# Patient Record
Sex: MALE | Race: WHITE | NOT HISPANIC OR LATINO
[De-identification: names, ages, dates, MRNs, and addresses within clinical notes are randomized per-mention and may not be internally consistent; named-entity substitution may affect disease eponyms.]

---

## 2017-02-01 ENCOUNTER — APPOINTMENT (OUTPATIENT)
Dept: HEART AND VASCULAR | Facility: CLINIC | Age: 82
End: 2017-02-01

## 2017-02-01 VITALS
DIASTOLIC BLOOD PRESSURE: 70 MMHG | HEIGHT: 67 IN | BODY MASS INDEX: 25.11 KG/M2 | SYSTOLIC BLOOD PRESSURE: 122 MMHG | HEART RATE: 71 BPM | WEIGHT: 160 LBS | OXYGEN SATURATION: 97 % | TEMPERATURE: 97.2 F

## 2017-02-01 DIAGNOSIS — M25.552 PAIN IN LEFT HIP: ICD-10-CM

## 2017-08-15 ENCOUNTER — LABORATORY RESULT (OUTPATIENT)
Age: 82
End: 2017-08-15

## 2017-08-16 ENCOUNTER — APPOINTMENT (OUTPATIENT)
Dept: INTERNAL MEDICINE | Facility: CLINIC | Age: 82
End: 2017-08-16
Payer: MEDICARE

## 2017-08-16 VITALS
OXYGEN SATURATION: 97 % | SYSTOLIC BLOOD PRESSURE: 120 MMHG | HEART RATE: 70 BPM | TEMPERATURE: 97.9 F | WEIGHT: 154 LBS | HEIGHT: 67 IN | BODY MASS INDEX: 24.17 KG/M2 | DIASTOLIC BLOOD PRESSURE: 70 MMHG | RESPIRATION RATE: 14 BRPM

## 2017-08-16 PROCEDURE — 93000 ELECTROCARDIOGRAM COMPLETE: CPT

## 2017-08-16 PROCEDURE — 99397 PER PM REEVAL EST PAT 65+ YR: CPT | Mod: 25

## 2017-08-16 PROCEDURE — 36415 COLL VENOUS BLD VENIPUNCTURE: CPT

## 2017-08-17 LAB
ALBUMIN SERPL ELPH-MCNC: 4.6 G/DL
ALP BLD-CCNC: 61 U/L
ALT SERPL-CCNC: 15 U/L
ANION GAP SERPL CALC-SCNC: 16 MMOL/L
APPEARANCE: ABNORMAL
AST SERPL-CCNC: 19 U/L
BASOPHILS # BLD AUTO: 0.03 K/UL
BASOPHILS NFR BLD AUTO: 0.5 %
BILIRUB SERPL-MCNC: 0.6 MG/DL
BILIRUBIN URINE: NEGATIVE
BLOOD URINE: NEGATIVE
BUN SERPL-MCNC: 17 MG/DL
CALCIUM SERPL-MCNC: 9.4 MG/DL
CHLORIDE SERPL-SCNC: 102 MMOL/L
CHOLEST SERPL-MCNC: 249 MG/DL
CHOLEST/HDLC SERPL: 4.5 RATIO
CO2 SERPL-SCNC: 23 MMOL/L
COLOR: ABNORMAL
CREAT SERPL-MCNC: 1.09 MG/DL
CREAT SPEC-SCNC: 206 MG/DL
EOSINOPHIL # BLD AUTO: 0.1 K/UL
EOSINOPHIL NFR BLD AUTO: 1.6 %
GLUCOSE QUALITATIVE U: NORMAL MG/DL
GLUCOSE SERPL-MCNC: 109 MG/DL
HBA1C MFR BLD HPLC: 5.6 %
HCT VFR BLD CALC: 46 %
HDLC SERPL-MCNC: 55 MG/DL
HGB BLD-MCNC: 14.5 G/DL
IMM GRANULOCYTES NFR BLD AUTO: 0.2 %
KETONES URINE: ABNORMAL
LDLC SERPL CALC-MCNC: 178 MG/DL
LEUKOCYTE ESTERASE URINE: NEGATIVE
LYMPHOCYTES # BLD AUTO: 1.54 K/UL
LYMPHOCYTES NFR BLD AUTO: 25.3 %
MAN DIFF?: NORMAL
MCHC RBC-ENTMCNC: 29.2 PG
MCHC RBC-ENTMCNC: 31.5 GM/DL
MCV RBC AUTO: 92.7 FL
MICROALBUMIN 24H UR DL<=1MG/L-MCNC: 1.3 MG/DL
MICROALBUMIN/CREAT 24H UR-RTO: 6 MG/G
MONOCYTES # BLD AUTO: 0.49 K/UL
MONOCYTES NFR BLD AUTO: 8.1 %
NEUTROPHILS # BLD AUTO: 3.91 K/UL
NEUTROPHILS NFR BLD AUTO: 64.3 %
NITRITE URINE: NEGATIVE
PH URINE: 5.5
PLATELET # BLD AUTO: 156 K/UL
POTASSIUM SERPL-SCNC: 4.8 MMOL/L
PROT SERPL-MCNC: 7.5 G/DL
PROTEIN URINE: NEGATIVE MG/DL
RBC # BLD: 4.96 M/UL
RBC # FLD: 14.6 %
SODIUM SERPL-SCNC: 141 MMOL/L
SPECIFIC GRAVITY URINE: 1.03
TRIGL SERPL-MCNC: 81 MG/DL
TSH SERPL-ACNC: 2.61 UIU/ML
UROBILINOGEN URINE: NORMAL MG/DL
WBC # FLD AUTO: 6.08 K/UL

## 2017-10-02 ENCOUNTER — APPOINTMENT (OUTPATIENT)
Dept: UROLOGY | Facility: CLINIC | Age: 82
End: 2017-10-02
Payer: MEDICARE

## 2017-10-02 VITALS — DIASTOLIC BLOOD PRESSURE: 65 MMHG | TEMPERATURE: 97.8 F | SYSTOLIC BLOOD PRESSURE: 123 MMHG | HEART RATE: 74 BPM

## 2017-10-02 DIAGNOSIS — R97.20 ELEVATED PROSTATE, SPECIFIC ANTIGEN [PSA]: ICD-10-CM

## 2017-10-02 DIAGNOSIS — R31.29 OTHER MICROSCOPIC HEMATURIA: ICD-10-CM

## 2017-10-02 PROCEDURE — 99213 OFFICE O/P EST LOW 20 MIN: CPT

## 2017-11-08 ENCOUNTER — APPOINTMENT (OUTPATIENT)
Dept: UROLOGY | Facility: CLINIC | Age: 82
End: 2017-11-08

## 2018-02-09 ENCOUNTER — RX RENEWAL (OUTPATIENT)
Age: 83
End: 2018-02-09

## 2018-04-09 ENCOUNTER — APPOINTMENT (OUTPATIENT)
Dept: HEART AND VASCULAR | Facility: CLINIC | Age: 83
End: 2018-04-09
Payer: MEDICARE

## 2018-04-09 VITALS
WEIGHT: 155 LBS | BODY MASS INDEX: 24.33 KG/M2 | DIASTOLIC BLOOD PRESSURE: 72 MMHG | TEMPERATURE: 97.6 F | HEART RATE: 75 BPM | OXYGEN SATURATION: 97 % | SYSTOLIC BLOOD PRESSURE: 120 MMHG | HEIGHT: 67 IN

## 2018-04-09 DIAGNOSIS — E78.2 MIXED HYPERLIPIDEMIA: ICD-10-CM

## 2018-04-09 DIAGNOSIS — R01.1 CARDIAC MURMUR, UNSPECIFIED: ICD-10-CM

## 2018-04-09 PROCEDURE — 36415 COLL VENOUS BLD VENIPUNCTURE: CPT

## 2018-04-09 PROCEDURE — 93306 TTE W/DOPPLER COMPLETE: CPT | Mod: XE

## 2018-04-09 PROCEDURE — 93000 ELECTROCARDIOGRAM COMPLETE: CPT

## 2018-04-09 PROCEDURE — 99214 OFFICE O/P EST MOD 30 MIN: CPT | Mod: 25

## 2018-04-10 LAB
ALBUMIN SERPL ELPH-MCNC: 4.3 G/DL
ALP BLD-CCNC: 61 U/L
ALT SERPL-CCNC: 12 U/L
ANION GAP SERPL CALC-SCNC: 16 MMOL/L
AST SERPL-CCNC: 17 U/L
BASOPHILS # BLD AUTO: 0.04 K/UL
BASOPHILS NFR BLD AUTO: 0.6 %
BILIRUB SERPL-MCNC: 0.4 MG/DL
BUN SERPL-MCNC: 16 MG/DL
CALCIUM SERPL-MCNC: 9.6 MG/DL
CHLORIDE SERPL-SCNC: 104 MMOL/L
CHOLEST SERPL-MCNC: 261 MG/DL
CHOLEST/HDLC SERPL: 4.5 RATIO
CO2 SERPL-SCNC: 21 MMOL/L
CREAT SERPL-MCNC: 1.13 MG/DL
EOSINOPHIL # BLD AUTO: 0.09 K/UL
EOSINOPHIL NFR BLD AUTO: 1.4 %
GLUCOSE SERPL-MCNC: 95 MG/DL
HBA1C MFR BLD HPLC: 5.5 %
HCT VFR BLD CALC: 43.8 %
HDLC SERPL-MCNC: 58 MG/DL
HGB BLD-MCNC: 14.4 G/DL
IMM GRANULOCYTES NFR BLD AUTO: 0 %
LDLC SERPL CALC-MCNC: 178 MG/DL
LYMPHOCYTES # BLD AUTO: 1.79 K/UL
LYMPHOCYTES NFR BLD AUTO: 28.6 %
MAN DIFF?: NORMAL
MCHC RBC-ENTMCNC: 30.3 PG
MCHC RBC-ENTMCNC: 32.9 GM/DL
MCV RBC AUTO: 92.2 FL
MONOCYTES # BLD AUTO: 0.57 K/UL
MONOCYTES NFR BLD AUTO: 9.1 %
NEUTROPHILS # BLD AUTO: 3.76 K/UL
NEUTROPHILS NFR BLD AUTO: 60.3 %
PLATELET # BLD AUTO: 158 K/UL
POTASSIUM SERPL-SCNC: 4.4 MMOL/L
PROT SERPL-MCNC: 7 G/DL
RBC # BLD: 4.75 M/UL
RBC # FLD: 14.9 %
SODIUM SERPL-SCNC: 141 MMOL/L
TRIGL SERPL-MCNC: 124 MG/DL
WBC # FLD AUTO: 6.25 K/UL

## 2018-07-09 ENCOUNTER — APPOINTMENT (OUTPATIENT)
Dept: HEART AND VASCULAR | Facility: CLINIC | Age: 83
End: 2018-07-09
Payer: MEDICARE

## 2018-07-09 VITALS
HEIGHT: 67 IN | DIASTOLIC BLOOD PRESSURE: 64 MMHG | RESPIRATION RATE: 14 BRPM | HEART RATE: 76 BPM | WEIGHT: 155 LBS | OXYGEN SATURATION: 98 % | TEMPERATURE: 98.2 F | SYSTOLIC BLOOD PRESSURE: 118 MMHG | BODY MASS INDEX: 24.33 KG/M2

## 2018-07-09 DIAGNOSIS — I45.10 UNSPECIFIED RIGHT BUNDLE-BRANCH BLOCK: ICD-10-CM

## 2018-07-09 DIAGNOSIS — Z01.818 ENCOUNTER FOR OTHER PREPROCEDURAL EXAMINATION: ICD-10-CM

## 2018-07-09 DIAGNOSIS — N40.0 BENIGN PROSTATIC HYPERPLASIA WITHOUT LOWER URINARY TRACT SYMPMS: ICD-10-CM

## 2018-07-09 DIAGNOSIS — I44.4 LEFT ANTERIOR FASCICULAR BLOCK: ICD-10-CM

## 2018-07-09 DIAGNOSIS — R94.31 ABNORMAL ELECTROCARDIOGRAM [ECG] [EKG]: ICD-10-CM

## 2018-07-09 DIAGNOSIS — I44.60 UNSPECIFIED FASCICULAR BLOCK: ICD-10-CM

## 2018-07-09 PROCEDURE — 99214 OFFICE O/P EST MOD 30 MIN: CPT | Mod: 25

## 2018-07-09 PROCEDURE — 36415 COLL VENOUS BLD VENIPUNCTURE: CPT

## 2018-07-11 LAB
ALBUMIN SERPL ELPH-MCNC: 4.6 G/DL
ALP BLD-CCNC: 65 U/L
ALT SERPL-CCNC: 22 U/L
ANION GAP SERPL CALC-SCNC: 14 MMOL/L
AST SERPL-CCNC: 30 U/L
BASOPHILS # BLD AUTO: 0.04 K/UL
BASOPHILS NFR BLD AUTO: 0.6 %
BILIRUB SERPL-MCNC: 0.4 MG/DL
BUN SERPL-MCNC: 16 MG/DL
CALCIUM SERPL-MCNC: 9.5 MG/DL
CHLORIDE SERPL-SCNC: 103 MMOL/L
CO2 SERPL-SCNC: 23 MMOL/L
CREAT SERPL-MCNC: 1.09 MG/DL
EOSINOPHIL # BLD AUTO: 0.1 K/UL
EOSINOPHIL NFR BLD AUTO: 1.5 %
GLUCOSE SERPL-MCNC: 142 MG/DL
HCT VFR BLD CALC: 44.4 %
HGB BLD-MCNC: 13.9 G/DL
IMM GRANULOCYTES NFR BLD AUTO: 0.2 %
LYMPHOCYTES # BLD AUTO: 1.82 K/UL
LYMPHOCYTES NFR BLD AUTO: 27.9 %
MAN DIFF?: NORMAL
MCHC RBC-ENTMCNC: 29.1 PG
MCHC RBC-ENTMCNC: 31.3 GM/DL
MCV RBC AUTO: 92.9 FL
MONOCYTES # BLD AUTO: 0.44 K/UL
MONOCYTES NFR BLD AUTO: 6.7 %
NEUTROPHILS # BLD AUTO: 4.11 K/UL
NEUTROPHILS NFR BLD AUTO: 63.1 %
PLATELET # BLD AUTO: 175 K/UL
POTASSIUM SERPL-SCNC: 4.7 MMOL/L
PROT SERPL-MCNC: 7.3 G/DL
RBC # BLD: 4.78 M/UL
RBC # FLD: 14.6 %
SODIUM SERPL-SCNC: 140 MMOL/L
WBC # FLD AUTO: 6.52 K/UL

## 2018-07-12 PROBLEM — N40.0 BENIGN PROSTATIC HYPERTROPHY: Status: ACTIVE | Noted: 2018-07-12

## 2018-07-12 PROBLEM — I44.60 BUNDLE BRANCH BLOCK, LEFT HEMIBLOCK: Status: ACTIVE | Noted: 2018-07-12

## 2018-07-12 PROBLEM — I45.10 RIGHT BUNDLE-BRANCH BLOCK: Status: ACTIVE | Noted: 2018-07-12

## 2018-07-25 PROBLEM — R94.31 ABNORMAL ELECTROCARDIOGRAM: Status: ACTIVE | Noted: 2018-07-12

## 2018-08-15 ENCOUNTER — RX RENEWAL (OUTPATIENT)
Age: 83
End: 2018-08-15

## 2018-08-17 ENCOUNTER — APPOINTMENT (OUTPATIENT)
Dept: HEART AND VASCULAR | Facility: CLINIC | Age: 83
End: 2018-08-17
Payer: MEDICARE

## 2018-08-17 VITALS
DIASTOLIC BLOOD PRESSURE: 80 MMHG | BODY MASS INDEX: 23.86 KG/M2 | RESPIRATION RATE: 14 BRPM | SYSTOLIC BLOOD PRESSURE: 120 MMHG | HEART RATE: 72 BPM | OXYGEN SATURATION: 96 % | HEIGHT: 67 IN | WEIGHT: 152 LBS | TEMPERATURE: 97.7 F

## 2018-08-17 DIAGNOSIS — R26.81 UNSTEADINESS ON FEET: ICD-10-CM

## 2018-08-17 PROCEDURE — 99397 PER PM REEVAL EST PAT 65+ YR: CPT | Mod: 25

## 2018-08-17 PROCEDURE — 36415 COLL VENOUS BLD VENIPUNCTURE: CPT

## 2018-08-20 LAB
ALBUMIN SERPL ELPH-MCNC: 4.9 G/DL
ALP BLD-CCNC: 66 U/L
ALT SERPL-CCNC: 18 U/L
ANION GAP SERPL CALC-SCNC: 14 MMOL/L
AST SERPL-CCNC: 28 U/L
BASOPHILS # BLD AUTO: 0.04 K/UL
BASOPHILS NFR BLD AUTO: 0.6 %
BILIRUB SERPL-MCNC: 0.4 MG/DL
BUN SERPL-MCNC: 17 MG/DL
CALCIUM SERPL-MCNC: 9.8 MG/DL
CHLORIDE SERPL-SCNC: 103 MMOL/L
CHOLEST SERPL-MCNC: 162 MG/DL
CHOLEST/HDLC SERPL: 2.8 RATIO
CO2 SERPL-SCNC: 26 MMOL/L
CREAT SERPL-MCNC: 1 MG/DL
EOSINOPHIL # BLD AUTO: 0.08 K/UL
EOSINOPHIL NFR BLD AUTO: 1.1 %
GLUCOSE SERPL-MCNC: 102 MG/DL
HBA1C MFR BLD HPLC: 5.7 %
HCT VFR BLD CALC: 44.9 %
HDLC SERPL-MCNC: 58 MG/DL
HGB BLD-MCNC: 14.5 G/DL
IMM GRANULOCYTES NFR BLD AUTO: 0.1 %
LDLC SERPL CALC-MCNC: 91 MG/DL
LYMPHOCYTES # BLD AUTO: 1.83 K/UL
LYMPHOCYTES NFR BLD AUTO: 25.6 %
MAN DIFF?: NORMAL
MCHC RBC-ENTMCNC: 29.5 PG
MCHC RBC-ENTMCNC: 32.3 GM/DL
MCV RBC AUTO: 91.4 FL
MONOCYTES # BLD AUTO: 0.6 K/UL
MONOCYTES NFR BLD AUTO: 8.4 %
NEUTROPHILS # BLD AUTO: 4.58 K/UL
NEUTROPHILS NFR BLD AUTO: 64.2 %
PLATELET # BLD AUTO: 183 K/UL
POTASSIUM SERPL-SCNC: 4.8 MMOL/L
PROT SERPL-MCNC: 7.2 G/DL
PSA SERPL-MCNC: 6.6 NG/ML
RBC # BLD: 4.91 M/UL
RBC # FLD: 14.5 %
SODIUM SERPL-SCNC: 142 MMOL/L
TRIGL SERPL-MCNC: 67 MG/DL
TSH SERPL-ACNC: 2.09 UIU/ML
WBC # FLD AUTO: 7.14 K/UL

## 2018-08-21 LAB

## 2018-09-24 ENCOUNTER — RX RENEWAL (OUTPATIENT)
Age: 83
End: 2018-09-24

## 2018-12-27 ENCOUNTER — MEDICATION RENEWAL (OUTPATIENT)
Age: 83
End: 2018-12-27

## 2019-03-19 ENCOUNTER — RX RENEWAL (OUTPATIENT)
Age: 84
End: 2019-03-19

## 2019-09-11 ENCOUNTER — APPOINTMENT (OUTPATIENT)
Dept: HEART AND VASCULAR | Facility: CLINIC | Age: 84
End: 2019-09-11
Payer: MEDICARE

## 2019-09-11 VITALS
TEMPERATURE: 97.8 F | BODY MASS INDEX: 24.33 KG/M2 | HEIGHT: 67 IN | WEIGHT: 155 LBS | DIASTOLIC BLOOD PRESSURE: 68 MMHG | OXYGEN SATURATION: 97 % | RESPIRATION RATE: 14 BRPM | HEART RATE: 70 BPM | SYSTOLIC BLOOD PRESSURE: 120 MMHG

## 2019-09-11 DIAGNOSIS — N40.1 BENIGN PROSTATIC HYPERPLASIA WITH LOWER URINARY TRACT SYMPMS: ICD-10-CM

## 2019-09-11 DIAGNOSIS — R35.1 BENIGN PROSTATIC HYPERPLASIA WITH LOWER URINARY TRACT SYMPMS: ICD-10-CM

## 2019-09-11 PROCEDURE — 36415 COLL VENOUS BLD VENIPUNCTURE: CPT

## 2019-09-11 PROCEDURE — 93000 ELECTROCARDIOGRAM COMPLETE: CPT

## 2019-09-11 PROCEDURE — 99214 OFFICE O/P EST MOD 30 MIN: CPT

## 2019-09-12 ENCOUNTER — TRANSCRIPTION ENCOUNTER (OUTPATIENT)
Age: 84
End: 2019-09-12

## 2019-09-12 LAB
ALBUMIN SERPL ELPH-MCNC: 4.6 G/DL
ALP BLD-CCNC: 60 U/L
ALT SERPL-CCNC: 20 U/L
ANION GAP SERPL CALC-SCNC: 16 MMOL/L
APPEARANCE: CLEAR
AST SERPL-CCNC: 24 U/L
BASOPHILS # BLD AUTO: 0.07 K/UL
BASOPHILS NFR BLD AUTO: 1 %
BILIRUB SERPL-MCNC: 0.5 MG/DL
BILIRUBIN URINE: NEGATIVE
BLOOD URINE: NEGATIVE
BUN SERPL-MCNC: 17 MG/DL
CALCIUM SERPL-MCNC: 9.8 MG/DL
CHLORIDE SERPL-SCNC: 102 MMOL/L
CHOLEST SERPL-MCNC: 176 MG/DL
CHOLEST/HDLC SERPL: 2.9 RATIO
CO2 SERPL-SCNC: 24 MMOL/L
COLOR: YELLOW
CREAT SERPL-MCNC: 1.11 MG/DL
CREAT SPEC-SCNC: 196 MG/DL
EOSINOPHIL # BLD AUTO: 0.1 K/UL
EOSINOPHIL NFR BLD AUTO: 1.4 %
ESTIMATED AVERAGE GLUCOSE: 111 MG/DL
GLUCOSE QUALITATIVE U: NEGATIVE
GLUCOSE SERPL-MCNC: 109 MG/DL
HBA1C MFR BLD HPLC: 5.5 %
HCT VFR BLD CALC: 44.9 %
HDLC SERPL-MCNC: 61 MG/DL
HGB BLD-MCNC: 14.4 G/DL
IMM GRANULOCYTES NFR BLD AUTO: 0.1 %
KETONES URINE: NEGATIVE
LDLC SERPL CALC-MCNC: 104 MG/DL
LEUKOCYTE ESTERASE URINE: NEGATIVE
LYMPHOCYTES # BLD AUTO: 2.09 K/UL
LYMPHOCYTES NFR BLD AUTO: 30.2 %
MAN DIFF?: NORMAL
MCHC RBC-ENTMCNC: 30.3 PG
MCHC RBC-ENTMCNC: 32.1 GM/DL
MCV RBC AUTO: 94.3 FL
MICROALBUMIN 24H UR DL<=1MG/L-MCNC: 3.5 MG/DL
MICROALBUMIN/CREAT 24H UR-RTO: 18 MG/G
MONOCYTES # BLD AUTO: 0.61 K/UL
MONOCYTES NFR BLD AUTO: 8.8 %
NEUTROPHILS # BLD AUTO: 4.04 K/UL
NEUTROPHILS NFR BLD AUTO: 58.5 %
NITRITE URINE: NEGATIVE
PH URINE: 5.5
PLATELET # BLD AUTO: 163 K/UL
POTASSIUM SERPL-SCNC: 5.2 MMOL/L
PROT SERPL-MCNC: 7.1 G/DL
PROTEIN URINE: NORMAL
PSA SERPL-MCNC: 6.65 NG/ML
RBC # BLD: 4.76 M/UL
RBC # FLD: 14.2 %
SODIUM SERPL-SCNC: 142 MMOL/L
SPECIFIC GRAVITY URINE: 1.03
TRIGL SERPL-MCNC: 55 MG/DL
TSH SERPL-ACNC: 3.04 UIU/ML
UROBILINOGEN URINE: NORMAL
WBC # FLD AUTO: 6.92 K/UL

## 2019-09-12 NOTE — DISCUSSION/SUMMARY
[___ Month(s)] : [unfilled] month(s) [With Me] : with me [FreeTextEntry1] : will see second opinion neuro\par rfm discussed

## 2019-09-12 NOTE — PHYSICAL EXAM
[General Appearance - Well Developed] : well developed [Normal Appearance] : normal appearance [Well Groomed] : well groomed [No Deformities] : no deformities [General Appearance - Well Nourished] : well nourished [General Appearance - In No Acute Distress] : no acute distress [Normal Conjunctiva] : the conjunctiva exhibited no abnormalities [Eyelids - No Xanthelasma] : the eyelids demonstrated no xanthelasmas [No Oral Pallor] : no oral pallor [Normal Oral Mucosa] : normal oral mucosa [No Oral Cyanosis] : no oral cyanosis [Normal Jugular Venous A Waves Present] : normal jugular venous A waves present [Normal Jugular Venous V Waves Present] : normal jugular venous V waves present [No Jugular Venous Castillo A Waves] : no jugular venous castillo A waves [Respiration, Rhythm And Depth] : normal respiratory rhythm and effort [Exaggerated Use Of Accessory Muscles For Inspiration] : no accessory muscle use [Auscultation Breath Sounds / Voice Sounds] : lungs were clear to auscultation bilaterally [Heart Rate And Rhythm] : heart rate and rhythm were normal [Heart Sounds] : normal S1 and S2 [Abdomen Soft] : soft [Abdomen Tenderness] : non-tender [Abdomen Mass (___ Cm)] : no abdominal mass palpated [Abnormal Walk] : normal gait [Gait - Sufficient For Exercise Testing] : the gait was sufficient for exercise testing [Nail Clubbing] : no clubbing of the fingernails [Cyanosis, Localized] : no localized cyanosis [Petechial Hemorrhages (___cm)] : no petechial hemorrhages [Skin Color & Pigmentation] : normal skin color and pigmentation [] : no rash [No Venous Stasis] : no venous stasis [Skin Lesions] : no skin lesions [No Skin Ulcers] : no skin ulcer [No Xanthoma] : no  xanthoma was observed [Affect] : the affect was normal [Oriented To Time, Place, And Person] : oriented to person, place, and time [Mood] : the mood was normal [No Anxiety] : not feeling anxious [FreeTextEntry1] : 1/6 kay

## 2019-09-12 NOTE — HISTORY OF PRESENT ILLNESS
[FreeTextEntry1] : for annual\par normal functional capacity\par no cp or dyspnea\par follows w urology and neuro- still having unsteady gait, leg weakness and chr hyperhidrosis\par discussed rfm, cancer screening and vaccines

## 2019-09-13 LAB
B BURGDOR AB SER-IMP: NEGATIVE
B BURGDOR IGM PATRN SER IB-IMP: NEGATIVE
B BURGDOR18KD IGG SER QL IB: NORMAL
B BURGDOR23KD IGG SER QL IB: NORMAL
B BURGDOR23KD IGM SER QL IB: NORMAL
B BURGDOR28KD IGG SER QL IB: NORMAL
B BURGDOR30KD IGG SER QL IB: NORMAL
B BURGDOR31KD IGG SER QL IB: NORMAL
B BURGDOR39KD IGG SER QL IB: NORMAL
B BURGDOR39KD IGM SER QL IB: NORMAL
B BURGDOR41KD IGG SER QL IB: PRESENT
B BURGDOR41KD IGM SER QL IB: PRESENT
B BURGDOR45KD IGG SER QL IB: NORMAL
B BURGDOR58KD IGG SER QL IB: NORMAL
B BURGDOR66KD IGG SER QL IB: NORMAL
B BURGDOR93KD IGG SER QL IB: NORMAL

## 2019-12-17 ENCOUNTER — RX RENEWAL (OUTPATIENT)
Age: 84
End: 2019-12-17

## 2020-03-18 ENCOUNTER — RX RENEWAL (OUTPATIENT)
Age: 85
End: 2020-03-18

## 2020-04-22 ENCOUNTER — RX RENEWAL (OUTPATIENT)
Age: 85
End: 2020-04-22

## 2020-05-29 ENCOUNTER — RX RENEWAL (OUTPATIENT)
Age: 85
End: 2020-05-29

## 2020-06-10 ENCOUNTER — RX RENEWAL (OUTPATIENT)
Age: 85
End: 2020-06-10

## 2020-06-18 ENCOUNTER — RX RENEWAL (OUTPATIENT)
Age: 85
End: 2020-06-18

## 2020-08-14 ENCOUNTER — APPOINTMENT (OUTPATIENT)
Dept: HEART AND VASCULAR | Facility: CLINIC | Age: 85
End: 2020-08-14

## 2020-12-15 ENCOUNTER — RX RENEWAL (OUTPATIENT)
Age: 85
End: 2020-12-15

## 2021-01-21 ENCOUNTER — RX RENEWAL (OUTPATIENT)
Age: 86
End: 2021-01-21

## 2021-03-05 LAB
ALBUMIN SERPL ELPH-MCNC: 4.5 G/DL
ALP BLD-CCNC: 69 U/L
ALT SERPL-CCNC: 20 U/L
ANION GAP SERPL CALC-SCNC: 10 MMOL/L
AST SERPL-CCNC: 30 U/L
BASOPHILS # BLD AUTO: 0.05 K/UL
BASOPHILS NFR BLD AUTO: 0.7 %
BILIRUB SERPL-MCNC: 0.4 MG/DL
BUN SERPL-MCNC: 22 MG/DL
CALCIUM SERPL-MCNC: 9.4 MG/DL
CHLORIDE SERPL-SCNC: 108 MMOL/L
CHOLEST SERPL-MCNC: 155 MG/DL
CO2 SERPL-SCNC: 25 MMOL/L
CREAT SERPL-MCNC: 1.18 MG/DL
EOSINOPHIL # BLD AUTO: 0.1 K/UL
EOSINOPHIL NFR BLD AUTO: 1.5 %
ESTIMATED AVERAGE GLUCOSE: 111 MG/DL
GLUCOSE SERPL-MCNC: 126 MG/DL
HBA1C MFR BLD HPLC: 5.5 %
HCT VFR BLD CALC: 40.9 %
HDLC SERPL-MCNC: 55 MG/DL
HGB BLD-MCNC: 13.6 G/DL
IMM GRANULOCYTES NFR BLD AUTO: 0.3 %
LDLC SERPL CALC-MCNC: 81 MG/DL
LYMPHOCYTES # BLD AUTO: 1.71 K/UL
LYMPHOCYTES NFR BLD AUTO: 25 %
MAN DIFF?: NORMAL
MCHC RBC-ENTMCNC: 30.8 PG
MCHC RBC-ENTMCNC: 33.3 GM/DL
MCV RBC AUTO: 92.5 FL
MONOCYTES # BLD AUTO: 0.51 K/UL
MONOCYTES NFR BLD AUTO: 7.4 %
NEUTROPHILS # BLD AUTO: 4.46 K/UL
NEUTROPHILS NFR BLD AUTO: 65.1 %
NONHDLC SERPL-MCNC: 100 MG/DL
PLATELET # BLD AUTO: 152 K/UL
POTASSIUM SERPL-SCNC: 4.8 MMOL/L
PROT SERPL-MCNC: 6.7 G/DL
RBC # BLD: 4.42 M/UL
RBC # FLD: 13.5 %
SARS-COV-2 IGG SERPL IA-ACNC: 0.07 INDEX
SARS-COV-2 IGG SERPL QL IA: NEGATIVE
SODIUM SERPL-SCNC: 143 MMOL/L
TRIGL SERPL-MCNC: 93 MG/DL
TSH SERPL-ACNC: 2.01 UIU/ML
WBC # FLD AUTO: 6.85 K/UL

## 2021-03-11 ENCOUNTER — APPOINTMENT (OUTPATIENT)
Dept: HEART AND VASCULAR | Facility: CLINIC | Age: 86
End: 2021-03-11
Payer: MEDICARE

## 2021-03-11 ENCOUNTER — APPOINTMENT (OUTPATIENT)
Dept: HEART AND VASCULAR | Facility: CLINIC | Age: 86
End: 2021-03-11

## 2021-03-11 VITALS
DIASTOLIC BLOOD PRESSURE: 70 MMHG | TEMPERATURE: 98.3 F | RESPIRATION RATE: 14 BRPM | HEIGHT: 67 IN | BODY MASS INDEX: 23.54 KG/M2 | WEIGHT: 150 LBS | SYSTOLIC BLOOD PRESSURE: 118 MMHG | HEART RATE: 84 BPM | OXYGEN SATURATION: 98 %

## 2021-03-11 DIAGNOSIS — I34.1 NONRHEUMATIC MITRAL (VALVE) PROLAPSE: ICD-10-CM

## 2021-03-11 PROCEDURE — 99397 PER PM REEVAL EST PAT 65+ YR: CPT

## 2021-03-11 PROCEDURE — 99072 ADDL SUPL MATRL&STAF TM PHE: CPT

## 2021-03-11 PROCEDURE — 93306 TTE W/DOPPLER COMPLETE: CPT

## 2021-03-11 PROCEDURE — 93000 ELECTROCARDIOGRAM COMPLETE: CPT

## 2021-03-15 PROBLEM — I34.1 MVP (MITRAL VALVE PROLAPSE): Status: ACTIVE | Noted: 2018-07-09

## 2021-03-15 NOTE — PHYSICAL EXAM
[General Appearance - Well Developed] : well developed [Normal Appearance] : normal appearance [Well Groomed] : well groomed [General Appearance - Well Nourished] : well nourished [No Deformities] : no deformities [General Appearance - In No Acute Distress] : no acute distress [Normal Conjunctiva] : the conjunctiva exhibited no abnormalities [Eyelids - No Xanthelasma] : the eyelids demonstrated no xanthelasmas [Normal Oral Mucosa] : normal oral mucosa [No Oral Pallor] : no oral pallor [No Oral Cyanosis] : no oral cyanosis [Normal Jugular Venous A Waves Present] : normal jugular venous A waves present [Normal Jugular Venous V Waves Present] : normal jugular venous V waves present [No Jugular Venous Castillo A Waves] : no jugular venous castillo A waves [Respiration, Rhythm And Depth] : normal respiratory rhythm and effort [Exaggerated Use Of Accessory Muscles For Inspiration] : no accessory muscle use [Auscultation Breath Sounds / Voice Sounds] : lungs were clear to auscultation bilaterally [Heart Rate And Rhythm] : heart rate and rhythm were normal [Heart Sounds] : normal S1 and S2 [Abdomen Soft] : soft [Abdomen Tenderness] : non-tender [Abdomen Mass (___ Cm)] : no abdominal mass palpated [Abnormal Walk] : normal gait [Gait - Sufficient For Exercise Testing] : the gait was sufficient for exercise testing [Nail Clubbing] : no clubbing of the fingernails [Cyanosis, Localized] : no localized cyanosis [Petechial Hemorrhages (___cm)] : no petechial hemorrhages [Skin Color & Pigmentation] : normal skin color and pigmentation [] : no rash [No Venous Stasis] : no venous stasis [Skin Lesions] : no skin lesions [No Skin Ulcers] : no skin ulcer [No Xanthoma] : no  xanthoma was observed [Oriented To Time, Place, And Person] : oriented to person, place, and time [Affect] : the affect was normal [Mood] : the mood was normal [No Anxiety] : not feeling anxious [FreeTextEntry1] : 1/6 kay

## 2021-03-15 NOTE — HISTORY OF PRESENT ILLNESS
[FreeTextEntry1] : for annual, f/u AS and MR\par normal functional capacity\par no cp or dyspnea\par follows w urology and neuro- still having unsteady gait, leg weakness and chr hyperhidrosis\par was fitted with leg braces that help considerably\par discussed rfm, cancer screening and vaccines

## 2021-05-18 ENCOUNTER — RX RENEWAL (OUTPATIENT)
Age: 86
End: 2021-05-18

## 2021-06-11 ENCOUNTER — RX RENEWAL (OUTPATIENT)
Age: 86
End: 2021-06-11

## 2021-07-08 ENCOUNTER — NON-APPOINTMENT (OUTPATIENT)
Age: 86
End: 2021-07-08

## 2021-12-14 ENCOUNTER — RX RENEWAL (OUTPATIENT)
Age: 86
End: 2021-12-14

## 2021-12-16 ENCOUNTER — RX RENEWAL (OUTPATIENT)
Age: 86
End: 2021-12-16

## 2022-01-07 ENCOUNTER — RX RENEWAL (OUTPATIENT)
Age: 87
End: 2022-01-07

## 2022-03-09 ENCOUNTER — NON-APPOINTMENT (OUTPATIENT)
Age: 87
End: 2022-03-09

## 2022-03-10 ENCOUNTER — APPOINTMENT (OUTPATIENT)
Dept: HEART AND VASCULAR | Facility: CLINIC | Age: 87
End: 2022-03-10

## 2022-03-14 ENCOUNTER — APPOINTMENT (OUTPATIENT)
Dept: HEART AND VASCULAR | Facility: CLINIC | Age: 87
End: 2022-03-14
Payer: MEDICARE

## 2022-03-14 VITALS
SYSTOLIC BLOOD PRESSURE: 110 MMHG | HEART RATE: 101 BPM | HEIGHT: 67 IN | RESPIRATION RATE: 14 BRPM | DIASTOLIC BLOOD PRESSURE: 70 MMHG | WEIGHT: 145 LBS | OXYGEN SATURATION: 96 % | BODY MASS INDEX: 22.76 KG/M2 | TEMPERATURE: 97.8 F

## 2022-03-14 DIAGNOSIS — E78.5 HYPERLIPIDEMIA, UNSPECIFIED: ICD-10-CM

## 2022-03-14 DIAGNOSIS — Z00.00 ENCOUNTER FOR GENERAL ADULT MEDICAL EXAMINATION W/OUT ABNORMAL FINDINGS: ICD-10-CM

## 2022-03-14 PROCEDURE — 93000 ELECTROCARDIOGRAM COMPLETE: CPT

## 2022-03-14 PROCEDURE — 99397 PER PM REEVAL EST PAT 65+ YR: CPT

## 2022-03-14 PROCEDURE — 36415 COLL VENOUS BLD VENIPUNCTURE: CPT

## 2022-03-14 NOTE — HISTORY OF PRESENT ILLNESS
[FreeTextEntry1] : for annual, \par normal functional capacity\par no cp or dyspnea\par follows w urology and neuro- still having unsteady gait, leg weakness and chr hyperhidrosis\par was fitted with leg braces that help considerably\par discussed rfm, cancer screening and vaccines\par requests endo eval for his thyroid due to heavy sweating that he is concerned is related to a sick euthyroid

## 2022-03-16 LAB
ALBUMIN SERPL ELPH-MCNC: 4.6 G/DL
ALP BLD-CCNC: 67 U/L
ALT SERPL-CCNC: 19 U/L
ANION GAP SERPL CALC-SCNC: 15 MMOL/L
AST SERPL-CCNC: 28 U/L
BASOPHILS # BLD AUTO: 0.06 K/UL
BASOPHILS NFR BLD AUTO: 0.9 %
BILIRUB SERPL-MCNC: 0.4 MG/DL
BUN SERPL-MCNC: 22 MG/DL
CALCIUM SERPL-MCNC: 9.6 MG/DL
CHLORIDE SERPL-SCNC: 105 MMOL/L
CHOLEST SERPL-MCNC: 173 MG/DL
CO2 SERPL-SCNC: 21 MMOL/L
CREAT SERPL-MCNC: 1.18 MG/DL
EGFR: 60 ML/MIN/1.73M2
EOSINOPHIL # BLD AUTO: 0.11 K/UL
EOSINOPHIL NFR BLD AUTO: 1.6 %
ESTIMATED AVERAGE GLUCOSE: 111 MG/DL
GLUCOSE SERPL-MCNC: 100 MG/DL
HBA1C MFR BLD HPLC: 5.5 %
HCT VFR BLD CALC: 44 %
HDLC SERPL-MCNC: 60 MG/DL
HGB BLD-MCNC: 14 G/DL
IMM GRANULOCYTES NFR BLD AUTO: 0.3 %
LDLC SERPL CALC-MCNC: 101 MG/DL
LYMPHOCYTES # BLD AUTO: 1.89 K/UL
LYMPHOCYTES NFR BLD AUTO: 27.1 %
MAN DIFF?: NORMAL
MCHC RBC-ENTMCNC: 29.4 PG
MCHC RBC-ENTMCNC: 31.8 GM/DL
MCV RBC AUTO: 92.4 FL
MONOCYTES # BLD AUTO: 0.53 K/UL
MONOCYTES NFR BLD AUTO: 7.6 %
NEUTROPHILS # BLD AUTO: 4.37 K/UL
NEUTROPHILS NFR BLD AUTO: 62.5 %
NONHDLC SERPL-MCNC: 112 MG/DL
PLATELET # BLD AUTO: 157 K/UL
POTASSIUM SERPL-SCNC: 4.4 MMOL/L
PROT SERPL-MCNC: 6.9 G/DL
RBC # BLD: 4.76 M/UL
RBC # FLD: 14.4 %
SODIUM SERPL-SCNC: 141 MMOL/L
TRIGL SERPL-MCNC: 60 MG/DL
TSH SERPL-ACNC: 4.03 UIU/ML
WBC # FLD AUTO: 6.98 K/UL

## 2022-04-26 RX ORDER — ATORVASTATIN CALCIUM 10 MG/1
10 TABLET, FILM COATED ORAL
Qty: 90 | Refills: 1 | Status: ACTIVE | COMMUNITY
Start: 2018-04-09 | End: 1900-01-01

## 2023-11-27 ENCOUNTER — NON-APPOINTMENT (OUTPATIENT)
Age: 88
End: 2023-11-27

## 2023-11-28 ENCOUNTER — OUTPATIENT (OUTPATIENT)
Dept: OUTPATIENT SERVICES | Facility: HOSPITAL | Age: 88
LOS: 1 days | Discharge: HOME | End: 2023-11-28
Payer: MEDICARE

## 2023-11-28 ENCOUNTER — RESULT REVIEW (OUTPATIENT)
Age: 88
End: 2023-11-28

## 2023-11-28 ENCOUNTER — APPOINTMENT (OUTPATIENT)
Dept: PULMONOLOGY | Facility: HOSPITAL | Age: 88
End: 2023-11-28

## 2023-11-28 LAB
GRAM STN FLD: SIGNIFICANT CHANGE UP
GRAM STN FLD: SIGNIFICANT CHANGE UP
SPECIMEN SOURCE: SIGNIFICANT CHANGE UP
SPECIMEN SOURCE: SIGNIFICANT CHANGE UP

## 2023-11-28 PROCEDURE — 88305 TISSUE EXAM BY PATHOLOGIST: CPT

## 2023-11-28 PROCEDURE — 88173 CYTOPATH EVAL FNA REPORT: CPT | Mod: 26

## 2023-11-28 PROCEDURE — 31628 BRONCHOSCOPY/LUNG BX EACH: CPT | Mod: RT

## 2023-11-28 PROCEDURE — 71045 X-RAY EXAM CHEST 1 VIEW: CPT

## 2023-11-28 PROCEDURE — 87206 SMEAR FLUORESCENT/ACID STAI: CPT

## 2023-11-28 PROCEDURE — 88173 CYTOPATH EVAL FNA REPORT: CPT

## 2023-11-28 PROCEDURE — 87102 FUNGUS ISOLATION CULTURE: CPT

## 2023-11-28 PROCEDURE — 87070 CULTURE OTHR SPECIMN AEROBIC: CPT

## 2023-11-28 PROCEDURE — 88112 CYTOPATH CELL ENHANCE TECH: CPT

## 2023-11-28 PROCEDURE — 87116 MYCOBACTERIA CULTURE: CPT

## 2023-11-28 PROCEDURE — 31653 BRONCH EBUS SAMPLNG 3/> NODE: CPT | Mod: GC

## 2023-11-28 PROCEDURE — 31653 BRONCH EBUS SAMPLNG 3/> NODE: CPT

## 2023-11-28 PROCEDURE — 31624 DX BRONCHOSCOPE/LAVAGE: CPT | Mod: GC

## 2023-11-28 PROCEDURE — 88112 CYTOPATH CELL ENHANCE TECH: CPT | Mod: 26,59

## 2023-11-28 PROCEDURE — 71045 X-RAY EXAM CHEST 1 VIEW: CPT | Mod: 26

## 2023-11-28 PROCEDURE — C9399: CPT

## 2023-11-28 PROCEDURE — S2900: CPT

## 2023-11-28 PROCEDURE — 31624 DX BRONCHOSCOPE/LAVAGE: CPT | Mod: RT

## 2023-11-28 PROCEDURE — 31654 BRONCH EBUS IVNTJ PERPH LES: CPT | Mod: GC

## 2023-11-28 PROCEDURE — 88305 TISSUE EXAM BY PATHOLOGIST: CPT | Mod: 26

## 2023-11-28 PROCEDURE — 31627 NAVIGATIONAL BRONCHOSCOPY: CPT | Mod: GC

## 2023-11-28 PROCEDURE — 31628 BRONCHOSCOPY/LUNG BX EACH: CPT | Mod: GC

## 2023-11-28 PROCEDURE — 76000 FLUOROSCOPY <1 HR PHYS/QHP: CPT

## 2023-11-28 PROCEDURE — 87015 SPECIMEN INFECT AGNT CONCNTJ: CPT

## 2023-11-28 NOTE — PRE-ANESTHESIA EVALUATION ADULT - NSANTHPMHFT_GEN_ALL_CORE
89yo F with PMH s/f   presenting for robotic navigational bronch with biopsy. 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

## 2023-11-28 NOTE — PRE-ANESTHESIA EVALUATION ADULT - NSDENTALSD_ENT_ALL_CORE
Missing multiple teeth - b/l bottom molars, upper right canines + loose posterior bottom molar/loose teeth/missing teeth

## 2023-11-28 NOTE — PRE-ANESTHESIA EVALUATION ADULT - NSANTHPEFT_GEN_ALL_CORE
Appearance is consistent with chronological age  Unlabored breathing  Awake and alert, moves all extremities

## 2023-11-29 LAB
NIGHT BLUE STAIN TISS: SIGNIFICANT CHANGE UP
NIGHT BLUE STAIN TISS: SIGNIFICANT CHANGE UP
NON-GYNECOLOGICAL CYTOLOGY STUDY: SIGNIFICANT CHANGE UP
SPECIMEN SOURCE: SIGNIFICANT CHANGE UP
SPECIMEN SOURCE: SIGNIFICANT CHANGE UP

## 2023-11-30 LAB
CULTURE RESULTS: SIGNIFICANT CHANGE UP
CULTURE RESULTS: SIGNIFICANT CHANGE UP
SPECIMEN SOURCE: SIGNIFICANT CHANGE UP
SPECIMEN SOURCE: SIGNIFICANT CHANGE UP

## 2023-12-01 LAB
SURGICAL PATHOLOGY STUDY: SIGNIFICANT CHANGE UP
SURGICAL PATHOLOGY STUDY: SIGNIFICANT CHANGE UP

## 2023-12-21 ENCOUNTER — APPOINTMENT (OUTPATIENT)
Dept: RADIATION ONCOLOGY | Facility: CLINIC | Age: 88
End: 2023-12-21
Payer: MEDICARE

## 2023-12-21 VITALS
BODY MASS INDEX: 23.02 KG/M2 | TEMPERATURE: 98.7 F | WEIGHT: 147 LBS | HEART RATE: 87 BPM | SYSTOLIC BLOOD PRESSURE: 135 MMHG | OXYGEN SATURATION: 97 % | DIASTOLIC BLOOD PRESSURE: 61 MMHG

## 2023-12-21 DIAGNOSIS — G60.9 HEREDITARY AND IDIOPATHIC NEUROPATHY, UNSPECIFIED: ICD-10-CM

## 2023-12-21 DIAGNOSIS — C34.91 MALIGNANT NEOPLASM OF UNSPECIFIED PART OF RIGHT BRONCHUS OR LUNG: ICD-10-CM

## 2023-12-21 PROCEDURE — 99205 OFFICE O/P NEW HI 60 MIN: CPT

## 2023-12-22 ENCOUNTER — NON-APPOINTMENT (OUTPATIENT)
Age: 88
End: 2023-12-22

## 2023-12-22 PROBLEM — C34.91 SQUAMOUS CELL CARCINOMA OF RIGHT LUNG: Status: ACTIVE | Noted: 2023-12-22

## 2023-12-23 ENCOUNTER — APPOINTMENT (OUTPATIENT)
Dept: MRI IMAGING | Facility: HOSPITAL | Age: 88
End: 2023-12-23

## 2023-12-23 ENCOUNTER — OUTPATIENT (OUTPATIENT)
Dept: OUTPATIENT SERVICES | Facility: HOSPITAL | Age: 88
LOS: 1 days | End: 2023-12-23
Payer: MEDICARE

## 2023-12-23 PROCEDURE — 70553 MRI BRAIN STEM W/O & W/DYE: CPT | Mod: 26

## 2023-12-23 PROCEDURE — 70553 MRI BRAIN STEM W/O & W/DYE: CPT

## 2023-12-23 PROCEDURE — A9585: CPT

## 2023-12-26 NOTE — VITALS
[NoTreatment Scheduled] : no treatment scheduled [Date: ____________] : Patient's last distress assessment performed on [unfilled]. [Referred Patient  to social work for follow-up] : Patient was referred to social work for follow-up [Maximal Pain Intensity: 0/10] : 0/10 [Least Pain Intensity: 0/10] : 0/10 [80: Normal activity with effort; some signs or symptoms of disease.] : 80: Normal activity with effort; some signs or symptoms of disease.  [ECOG Performance Status: 2 - Ambulatory and capable of all self care but unable to carry out any work activities] : Performance Status: 2 - Ambulatory and capable of all self care but unable to carry out any work activities. Up and about more than 50% of waking hours [6 - Distress Level] : Distress Level: 6

## 2023-12-26 NOTE — PHYSICAL EXAM
[Hearing Threshold Finger Rub Not Juab] : hearing was normal [] : no respiratory distress [Respiration, Rhythm And Depth] : normal respiratory rhythm and effort [Exaggerated Use Of Accessory Muscles For Inspiration] : no accessory muscle use [Nondistended] : nondistended [Supraclavicular Lymph Nodes Enlarged Bilaterally] : supraclavicular [Normal] : oriented to person, place and time, the affect was normal, the mood was normal and not anxious

## 2023-12-28 NOTE — REVIEW OF SYSTEMS
[Cough] : cough [Disturbance Of Gait] : gait disturbance [Negative] : Heme/Lymph [Fatigue: Grade 0] : Fatigue: Grade 0 [Cough: Grade 1 - Mild symptoms; nonprescription intervention indicated] : Cough: Grade 1 - Mild symptoms; nonprescription intervention indicated [de-identified] : idiopathic neuropathy

## 2023-12-28 NOTE — HISTORY OF PRESENT ILLNESS
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 Hubbard for discussion of radiation to the Right lung.    11/21/23: Consultation  Patient presents for discussion of radiation to the lung. He denies PMH of radiation or PPM/ICD. He lives alone and has concerns regarding traveling for treatment as he has noticed his balance has been a little more tenuous since diagnosis. 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 He reports he has a sister in [place], but no friends or family in the area. He is amenable to contact by social work to discuss transportation or other support should he choose to proceed with radiation at [facility]. He denies CP/SOB.   History of Present Illness  ___________________________   In mid-2023, patient presented with new symptoms of difficulty breathing and chest pain. He was referred for a CT scan of the chest.  10/9/23 CT chest showed: There is right lower lobe subpleural consolidation measuring 4.0 cm. Differential diagnosis includes malignancy and pneumonia. There are also likely enlarged right hilar lymph nodes. Recommend PET/CT or short-term follow-up imaging in 4-6 weeks after treatment to assess for resolution.    10/27/23 PET 1.  Hypermetabolic partially cavitary right lower lobe pulmonary mass, which is highly suspicious for a primary bronchogenic malignancy. If biopsy is planned, the most intense activity is in the medial aspect of this mass, and this area should be targeted. 2.  Emphysematous changes of the lungs with additional small pulmonary nodules, which are likely below the size resolution of PET imaging. Attention to these nodules on follow-up examinations would be helpful in further evaluation. 3.  Hypermetabolic mediastinal and right hilar lymph nodes, which are highly suspicious for metastatic viv disease. 4.  Otherwise, no abnormal hypermetabolic activity to suggest additional site malignancy. 5.  Cholelithiasis. 6.  Subcentimeter low-attenuation lesions in the liver, which are too small to further characterize. Attention to these findings on follow-up examinations would be helpful in further evaluation. 7.  Markedly enlarged prostate with imaging evidence consistent with bladder outlet obstruction. 8.  Multiple bladder calculi.  11/28/23: EBUS guided FNA  Final Diagnosis Lung, right lower lobe; biopsy: - Squamous cell carcinoma, keratinizing type - See Note. Note: PD-L1 is pending on the concurrent cytology specimen, [id number].  LYMPH NODE, 11RI, EBUS-GUIDED FNA POSITIVE FOR MALIGNANT CELLS. Metastatic squamous cell carcinoma, with keratinizing features  LYMPH NODE,7, EBUS-GUIDED FNA POSITIVE FOR MALIGNANT CELLS. Metastatic squamous cell carcinoma with keratinizing features.  LYMPH NODE,4R, EBUS-GUIDED FNA NEGATIVE FOR MALIGNANT CELLS.   BRONCHOALVEOLAR LAVAGE, RIGHT,LOWER LOBE POSITIVE FOR MALIGNANT CELLS. Keratinizing squamous cell carcinoma.     The patient was evaluated by Dr. Hubbard, who recommended definitive CRT given the patient's N2 disease.

## 2024-01-08 ENCOUNTER — NON-APPOINTMENT (OUTPATIENT)
Age: 89
End: 2024-01-08

## 2024-01-09 ENCOUNTER — TRANSCRIPTION ENCOUNTER (OUTPATIENT)
Age: 89
End: 2024-01-09

## 2024-01-09 ENCOUNTER — APPOINTMENT (OUTPATIENT)
Dept: PULMONOLOGY | Facility: HOSPITAL | Age: 89
End: 2024-01-09

## 2024-01-09 ENCOUNTER — RESULT REVIEW (OUTPATIENT)
Age: 89
End: 2024-01-09

## 2024-01-09 ENCOUNTER — OUTPATIENT (OUTPATIENT)
Dept: OUTPATIENT SERVICES | Facility: HOSPITAL | Age: 89
LOS: 1 days | Discharge: HOME | End: 2024-01-09
Payer: MEDICARE

## 2024-01-09 LAB
ALBUMIN FLD-MCNC: 2.4 G/DL — SIGNIFICANT CHANGE UP
ALBUMIN FLD-MCNC: 2.4 G/DL — SIGNIFICANT CHANGE UP
ANION GAP SERPL CALC-SCNC: 10 MMOL/L — SIGNIFICANT CHANGE UP (ref 5–17)
ANION GAP SERPL CALC-SCNC: 10 MMOL/L — SIGNIFICANT CHANGE UP (ref 5–17)
APTT BLD: 30.3 SEC — SIGNIFICANT CHANGE UP (ref 24.5–35.6)
APTT BLD: 30.3 SEC — SIGNIFICANT CHANGE UP (ref 24.5–35.6)
B PERT IGG+IGM PNL SER: SIGNIFICANT CHANGE UP
B PERT IGG+IGM PNL SER: SIGNIFICANT CHANGE UP
BUN SERPL-MCNC: 19 MG/DL — SIGNIFICANT CHANGE UP (ref 7–23)
BUN SERPL-MCNC: 19 MG/DL — SIGNIFICANT CHANGE UP (ref 7–23)
CALCIUM SERPL-MCNC: 8.9 MG/DL — SIGNIFICANT CHANGE UP (ref 8.4–10.5)
CALCIUM SERPL-MCNC: 8.9 MG/DL — SIGNIFICANT CHANGE UP (ref 8.4–10.5)
CHLORIDE SERPL-SCNC: 101 MMOL/L — SIGNIFICANT CHANGE UP (ref 96–108)
CHLORIDE SERPL-SCNC: 101 MMOL/L — SIGNIFICANT CHANGE UP (ref 96–108)
CO2 SERPL-SCNC: 24 MMOL/L — SIGNIFICANT CHANGE UP (ref 22–31)
CO2 SERPL-SCNC: 24 MMOL/L — SIGNIFICANT CHANGE UP (ref 22–31)
COLOR FLD: SIGNIFICANT CHANGE UP
COLOR FLD: SIGNIFICANT CHANGE UP
CREAT SERPL-MCNC: 1.13 MG/DL — SIGNIFICANT CHANGE UP (ref 0.5–1.3)
CREAT SERPL-MCNC: 1.13 MG/DL — SIGNIFICANT CHANGE UP (ref 0.5–1.3)
EGFR: 63 ML/MIN/1.73M2 — SIGNIFICANT CHANGE UP
EGFR: 63 ML/MIN/1.73M2 — SIGNIFICANT CHANGE UP
EOSINOPHIL # FLD: 7 % — SIGNIFICANT CHANGE UP
EOSINOPHIL # FLD: 7 % — SIGNIFICANT CHANGE UP
FLUID INTAKE SUBSTANCE CLASS: SIGNIFICANT CHANGE UP
FLUID INTAKE SUBSTANCE CLASS: SIGNIFICANT CHANGE UP
GLUCOSE FLD-MCNC: 108 MG/DL — SIGNIFICANT CHANGE UP
GLUCOSE FLD-MCNC: 108 MG/DL — SIGNIFICANT CHANGE UP
GLUCOSE SERPL-MCNC: 132 MG/DL — HIGH (ref 70–99)
GLUCOSE SERPL-MCNC: 132 MG/DL — HIGH (ref 70–99)
GRAM STN FLD: SIGNIFICANT CHANGE UP
GRAM STN FLD: SIGNIFICANT CHANGE UP
HCT VFR BLD CALC: 35.6 % — LOW (ref 39–50)
HCT VFR BLD CALC: 35.6 % — LOW (ref 39–50)
HGB BLD-MCNC: 11.7 G/DL — LOW (ref 13–17)
HGB BLD-MCNC: 11.7 G/DL — LOW (ref 13–17)
INR BLD: 1.12 — SIGNIFICANT CHANGE UP (ref 0.85–1.18)
INR BLD: 1.12 — SIGNIFICANT CHANGE UP (ref 0.85–1.18)
LDH SERPL L TO P-CCNC: 383 U/L — SIGNIFICANT CHANGE UP
LDH SERPL L TO P-CCNC: 383 U/L — SIGNIFICANT CHANGE UP
LDH SERPL L TO P-CCNC: 428 U/L — HIGH (ref 50–242)
LDH SERPL L TO P-CCNC: 428 U/L — HIGH (ref 50–242)
LYMPHOCYTES # FLD: 30 % — SIGNIFICANT CHANGE UP
LYMPHOCYTES # FLD: 30 % — SIGNIFICANT CHANGE UP
MCHC RBC-ENTMCNC: 27 PG — SIGNIFICANT CHANGE UP (ref 27–34)
MCHC RBC-ENTMCNC: 27 PG — SIGNIFICANT CHANGE UP (ref 27–34)
MCHC RBC-ENTMCNC: 32.9 GM/DL — SIGNIFICANT CHANGE UP (ref 32–36)
MCHC RBC-ENTMCNC: 32.9 GM/DL — SIGNIFICANT CHANGE UP (ref 32–36)
MCV RBC AUTO: 82.2 FL — SIGNIFICANT CHANGE UP (ref 80–100)
MCV RBC AUTO: 82.2 FL — SIGNIFICANT CHANGE UP (ref 80–100)
MONOS+MACROS # FLD: 4 % — SIGNIFICANT CHANGE UP
MONOS+MACROS # FLD: 4 % — SIGNIFICANT CHANGE UP
NEUTROPHILS-BODY FLUID: 59 % — SIGNIFICANT CHANGE UP
NEUTROPHILS-BODY FLUID: 59 % — SIGNIFICANT CHANGE UP
NRBC # BLD: 0 /100 WBCS — SIGNIFICANT CHANGE UP (ref 0–0)
NRBC # BLD: 0 /100 WBCS — SIGNIFICANT CHANGE UP (ref 0–0)
PH, PLEURAL FLUID: 7.71 — SIGNIFICANT CHANGE UP
PH, PLEURAL FLUID: 7.71 — SIGNIFICANT CHANGE UP
PLATELET # BLD AUTO: 418 K/UL — HIGH (ref 150–400)
PLATELET # BLD AUTO: 418 K/UL — HIGH (ref 150–400)
POTASSIUM SERPL-MCNC: 4 MMOL/L — SIGNIFICANT CHANGE UP (ref 3.5–5.3)
POTASSIUM SERPL-MCNC: 4 MMOL/L — SIGNIFICANT CHANGE UP (ref 3.5–5.3)
POTASSIUM SERPL-SCNC: 4 MMOL/L — SIGNIFICANT CHANGE UP (ref 3.5–5.3)
POTASSIUM SERPL-SCNC: 4 MMOL/L — SIGNIFICANT CHANGE UP (ref 3.5–5.3)
PROT FLD-MCNC: 3.9 G/DL — SIGNIFICANT CHANGE UP
PROT FLD-MCNC: 3.9 G/DL — SIGNIFICANT CHANGE UP
PROT SERPL-MCNC: 6.2 G/DL — SIGNIFICANT CHANGE UP (ref 6–8.3)
PROT SERPL-MCNC: 6.2 G/DL — SIGNIFICANT CHANGE UP (ref 6–8.3)
PROTHROM AB SERPL-ACNC: 12.7 SEC — SIGNIFICANT CHANGE UP (ref 9.5–13)
PROTHROM AB SERPL-ACNC: 12.7 SEC — SIGNIFICANT CHANGE UP (ref 9.5–13)
RBC # BLD: 4.33 M/UL — SIGNIFICANT CHANGE UP (ref 4.2–5.8)
RBC # BLD: 4.33 M/UL — SIGNIFICANT CHANGE UP (ref 4.2–5.8)
RBC # FLD: 14 % — SIGNIFICANT CHANGE UP (ref 10.3–14.5)
RBC # FLD: 14 % — SIGNIFICANT CHANGE UP (ref 10.3–14.5)
RCV VOL RI: HIGH /UL (ref 0–0)
RCV VOL RI: HIGH /UL (ref 0–0)
SODIUM SERPL-SCNC: 135 MMOL/L — SIGNIFICANT CHANGE UP (ref 135–145)
SODIUM SERPL-SCNC: 135 MMOL/L — SIGNIFICANT CHANGE UP (ref 135–145)
SPECIMEN SOURCE FLD: SIGNIFICANT CHANGE UP
SPECIMEN SOURCE: SIGNIFICANT CHANGE UP
SPECIMEN SOURCE: SIGNIFICANT CHANGE UP
TOTAL NUCLEATED CELL COUNT, BODY FLUID: 1081 /UL — SIGNIFICANT CHANGE UP
TOTAL NUCLEATED CELL COUNT, BODY FLUID: 1081 /UL — SIGNIFICANT CHANGE UP
TUBE TYPE: SIGNIFICANT CHANGE UP
TUBE TYPE: SIGNIFICANT CHANGE UP
WBC # BLD: 15.8 K/UL — HIGH (ref 3.8–10.5)
WBC # BLD: 15.8 K/UL — HIGH (ref 3.8–10.5)
WBC # FLD AUTO: 15.8 K/UL — HIGH (ref 3.8–10.5)
WBC # FLD AUTO: 15.8 K/UL — HIGH (ref 3.8–10.5)

## 2024-01-09 PROCEDURE — 88112 CYTOPATH CELL ENHANCE TECH: CPT

## 2024-01-09 PROCEDURE — 85610 PROTHROMBIN TIME: CPT

## 2024-01-09 PROCEDURE — 85027 COMPLETE CBC AUTOMATED: CPT

## 2024-01-09 PROCEDURE — 32555 ASPIRATE PLEURA W/ IMAGING: CPT | Mod: GC

## 2024-01-09 PROCEDURE — 82945 GLUCOSE OTHER FLUID: CPT

## 2024-01-09 PROCEDURE — 84311 SPECTROPHOTOMETRY: CPT

## 2024-01-09 PROCEDURE — 84157 ASSAY OF PROTEIN OTHER: CPT

## 2024-01-09 PROCEDURE — 85730 THROMBOPLASTIN TIME PARTIAL: CPT

## 2024-01-09 PROCEDURE — 83986 ASSAY PH BODY FLUID NOS: CPT

## 2024-01-09 PROCEDURE — 87070 CULTURE OTHR SPECIMN AEROBIC: CPT

## 2024-01-09 PROCEDURE — 87205 SMEAR GRAM STAIN: CPT

## 2024-01-09 PROCEDURE — 76604 US EXAM CHEST: CPT | Mod: 26,GC

## 2024-01-09 PROCEDURE — 82042 OTHER SOURCE ALBUMIN QUAN EA: CPT

## 2024-01-09 PROCEDURE — 32555 ASPIRATE PLEURA W/ IMAGING: CPT | Mod: RT

## 2024-01-09 PROCEDURE — 36415 COLL VENOUS BLD VENIPUNCTURE: CPT

## 2024-01-09 PROCEDURE — 89051 BODY FLUID CELL COUNT: CPT

## 2024-01-09 PROCEDURE — 87075 CULTR BACTERIA EXCEPT BLOOD: CPT

## 2024-01-09 PROCEDURE — 84155 ASSAY OF PROTEIN SERUM: CPT

## 2024-01-09 PROCEDURE — 80048 BASIC METABOLIC PNL TOTAL CA: CPT

## 2024-01-09 PROCEDURE — 88305 TISSUE EXAM BY PATHOLOGIST: CPT

## 2024-01-09 PROCEDURE — 83615 LACTATE (LD) (LDH) ENZYME: CPT

## 2024-01-09 PROCEDURE — 88112 CYTOPATH CELL ENHANCE TECH: CPT | Mod: 26

## 2024-01-09 PROCEDURE — C1729: CPT

## 2024-01-09 PROCEDURE — 88305 TISSUE EXAM BY PATHOLOGIST: CPT | Mod: 26

## 2024-01-09 DEVICE — TRAY THORAC PARACENT W CATH AND DRAIN SET: Type: IMPLANTABLE DEVICE | Status: FUNCTIONAL

## 2024-01-09 NOTE — PROCEDURE NOTE - NSUSCPTCODES_ED_ALL
69373 US Chest (PTX, Pleural Effussion/CHF vs COPD) 28545 US Chest (PTX, Pleural Effussion/CHF vs COPD)

## 2024-01-09 NOTE — PROCEDURE NOTE - NSUSDIAGNOSIS_ED_ALL
large pleural effusion with septations on Right sided with anterior component. no effusion on left/Pleural Effusion Right

## 2024-01-10 LAB
CHOLEST FLD-MCNC: 74 MG/DL — SIGNIFICANT CHANGE UP
CHOLEST FLD-MCNC: 74 MG/DL — SIGNIFICANT CHANGE UP

## 2024-01-11 LAB
NON-GYNECOLOGICAL CYTOLOGY STUDY: SIGNIFICANT CHANGE UP
NON-GYNECOLOGICAL CYTOLOGY STUDY: SIGNIFICANT CHANGE UP

## 2024-01-14 LAB
CULTURE RESULTS: NO GROWTH — SIGNIFICANT CHANGE UP
CULTURE RESULTS: NO GROWTH — SIGNIFICANT CHANGE UP
SPECIMEN SOURCE: SIGNIFICANT CHANGE UP
SPECIMEN SOURCE: SIGNIFICANT CHANGE UP

## 2024-01-16 ENCOUNTER — APPOINTMENT (OUTPATIENT)
Dept: PULMONOLOGY | Facility: CLINIC | Age: 89
End: 2024-01-16
Payer: MEDICARE

## 2024-01-16 VITALS
HEART RATE: 122 BPM | DIASTOLIC BLOOD PRESSURE: 73 MMHG | HEIGHT: 67 IN | OXYGEN SATURATION: 96 % | RESPIRATION RATE: 12 BRPM | WEIGHT: 138 LBS | BODY MASS INDEX: 21.66 KG/M2 | TEMPERATURE: 98.4 F | SYSTOLIC BLOOD PRESSURE: 129 MMHG

## 2024-01-16 PROCEDURE — 76604 US EXAM CHEST: CPT

## 2024-01-16 PROCEDURE — 99213 OFFICE O/P EST LOW 20 MIN: CPT | Mod: 25

## 2024-01-17 ENCOUNTER — APPOINTMENT (OUTPATIENT)
Dept: PULMONOLOGY | Facility: HOSPITAL | Age: 89
End: 2024-01-17

## 2024-01-17 ENCOUNTER — RESULT REVIEW (OUTPATIENT)
Age: 89
End: 2024-01-17

## 2024-01-17 ENCOUNTER — OUTPATIENT (OUTPATIENT)
Dept: OUTPATIENT SERVICES | Facility: HOSPITAL | Age: 89
LOS: 1 days | Discharge: HOME | End: 2024-01-17
Payer: MEDICARE

## 2024-01-17 PROCEDURE — 71045 X-RAY EXAM CHEST 1 VIEW: CPT

## 2024-01-17 PROCEDURE — 88112 CYTOPATH CELL ENHANCE TECH: CPT | Mod: 26

## 2024-01-17 PROCEDURE — 88112 CYTOPATH CELL ENHANCE TECH: CPT

## 2024-01-17 PROCEDURE — 32550 INSERT PLEURAL CATH: CPT | Mod: GC

## 2024-01-17 PROCEDURE — 88305 TISSUE EXAM BY PATHOLOGIST: CPT | Mod: 26

## 2024-01-17 PROCEDURE — C1729: CPT

## 2024-01-17 PROCEDURE — 88305 TISSUE EXAM BY PATHOLOGIST: CPT

## 2024-01-17 PROCEDURE — 76604 US EXAM CHEST: CPT | Mod: 26,GC

## 2024-01-17 PROCEDURE — 71045 X-RAY EXAM CHEST 1 VIEW: CPT | Mod: 26

## 2024-01-17 PROCEDURE — 31643 DIAG BRONCHOSCOPE/CATHETER: CPT

## 2024-01-17 DEVICE — IMPLANTABLE DEVICE: Type: IMPLANTABLE DEVICE | Status: FUNCTIONAL

## 2024-01-17 DEVICE — PLEURX CATHETER KIT: Type: IMPLANTABLE DEVICE | Status: FUNCTIONAL

## 2024-01-17 RX ORDER — OXYCODONE AND ACETAMINOPHEN 5; 325 MG/1; MG/1
5-325 TABLET ORAL
Qty: 15 | Refills: 0 | Status: ACTIVE | COMMUNITY
Start: 2024-01-17 | End: 1900-01-01

## 2024-01-17 NOTE — PROCEDURE NOTE - NSUS ED ADDITIONAL DETAIL1 FT
Right sided pleural effusion, complicated and loculated in nature, moderate in size tracking along posterior and anterior wall. US used during procedure confirming guidewire placement.  Guidewire noted in pleural space.

## 2024-01-17 NOTE — HISTORY OF PRESENT ILLNESS
[TextBox_4] : 87 yo M with Right Lung SCCa RLL T2bN2 Stage IIIA  who was undergoing CT stim test when he was found to have a large right pleural effusion.   S/p  R thoracentesis on 1/9 with 2L removed (cytology negative, neutrophils 59%, lymphocytes 30%, exudative by criteria).   He presents today for follow up post-procedure. He reports that 3 days after his procedure, he developed SOB symptoms again, worse with laying down. He also reports progressive fatigue, loss of appetite.   US performed today, reaccumulation of fluid on R seen.

## 2024-01-17 NOTE — PROCEDURE
[Thoracic Ultrasound] : Thoracic Ultrasound [Complex] : complex [A line] : A line: Yes [B line] : B line: Yes [Lung sliding] : Lung sliding: Yes [Pleural Effusion] : Pleural Effusion: No [Consolidation] : Consolidation: No [de-identified] : pleural effusion [FreeTextEntry2] : tumor within RLL seen on US exam, effusion complex and loculated tracing up and anteriorly

## 2024-01-17 NOTE — ASSESSMENT
[FreeTextEntry1] : 87 yo M with Right Lung SCC RLL T2bN2 presumed IIIA  who was undergoing CT stim test when he was found to have a large right pleural effusion. He underwent R thoracentesis 1/9 with 2L removed (cytology negative, neutrophils 59%, lymphocytes 30%, exudative by criteria).   Fluid reaccumulate and he is symptomatic. he will need longer lasting intervention to manage fluid. IPC was recommended. Patient agreed. Procedure, risk benefit and alternative were discussed with patient and his sister. All questions were answered. Patient agrees to proceed with placement of IPC on 1/17/24. We'll resend fluid for cytology. Further management based on fluid analysis.

## 2024-01-17 NOTE — PHYSICAL EXAM
[No Acute Distress] : no acute distress [Normal Oropharynx] : normal oropharynx [Normal Appearance] : normal appearance [No Neck Mass] : no neck mass [Normal Rate/Rhythm] : normal rate/rhythm [Normal S1, S2] : normal s1, s2 [No Murmurs] : no murmurs [No Resp Distress] : no resp distress [No Abnormalities] : no abnormalities [Benign] : benign [Normal Gait] : normal gait [No Clubbing] : no clubbing [No Edema] : no edema [Normal Color/ Pigmentation] : normal color/ pigmentation [No Focal Deficits] : no focal deficits [Oriented x3] : oriented x3 [Normal Affect] : normal affect [Well Groomed] : well groomed [Supple] : supple [No Cyanosis] : no cyanosis [TextBox_68] : dec breath sounds on R

## 2024-01-17 NOTE — REASON FOR VISIT
[Follow-Up] : a follow-up visit [Abnormal CXR/ Chest CT] : an abnormal CXR/ chest CT [Lung Cancer] : lung cancer [Shortness of Breath] : shortness of breath [Other: _____] : [unfilled] [TextBox_44] : R pleural effusion

## 2024-01-17 NOTE — REVIEW OF SYSTEMS
[Fatigue] : fatigue [Poor Appetite] : poor appetite [Negative] : Neurologic [Chest Tightness] : no chest tightness [Dyspnea] : dyspnea [Pleuritic Pain] : no pleuritic pain [SOB on Exertion] : sob on exertion [Immunocompromised] : immunocompromised

## 2024-01-21 LAB — NON-GYNECOLOGICAL CYTOLOGY STUDY: SIGNIFICANT CHANGE UP

## 2024-01-25 ENCOUNTER — NON-APPOINTMENT (OUTPATIENT)
Age: 89
End: 2024-01-25

## 2024-02-20 ENCOUNTER — INPATIENT (INPATIENT)
Facility: HOSPITAL | Age: 89
LOS: 8 days | Discharge: NURSING, REHAB OR LONG-TERM CARE | DRG: 180 | End: 2024-02-29
Attending: STUDENT IN AN ORGANIZED HEALTH CARE EDUCATION/TRAINING PROGRAM | Admitting: INTERNAL MEDICINE
Payer: MEDICARE

## 2024-02-20 ENCOUNTER — NON-APPOINTMENT (OUTPATIENT)
Age: 89
End: 2024-02-20

## 2024-02-20 VITALS
OXYGEN SATURATION: 97 % | HEART RATE: 100 BPM | SYSTOLIC BLOOD PRESSURE: 90 MMHG | HEIGHT: 67 IN | TEMPERATURE: 97 F | DIASTOLIC BLOOD PRESSURE: 57 MMHG | WEIGHT: 115.08 LBS | RESPIRATION RATE: 18 BRPM

## 2024-02-20 DIAGNOSIS — Z98.890 OTHER SPECIFIED POSTPROCEDURAL STATES: Chronic | ICD-10-CM

## 2024-02-20 DIAGNOSIS — R62.7 ADULT FAILURE TO THRIVE: ICD-10-CM

## 2024-02-20 DIAGNOSIS — E46 UNSPECIFIED PROTEIN-CALORIE MALNUTRITION: ICD-10-CM

## 2024-02-20 DIAGNOSIS — C34.90 MALIGNANT NEOPLASM OF UNSPECIFIED PART OF UNSPECIFIED BRONCHUS OR LUNG: ICD-10-CM

## 2024-02-20 DIAGNOSIS — J90 PLEURAL EFFUSION, NOT ELSEWHERE CLASSIFIED: ICD-10-CM

## 2024-02-20 DIAGNOSIS — D72.829 ELEVATED WHITE BLOOD CELL COUNT, UNSPECIFIED: ICD-10-CM

## 2024-02-20 DIAGNOSIS — Z29.9 ENCOUNTER FOR PROPHYLACTIC MEASURES, UNSPECIFIED: ICD-10-CM

## 2024-02-20 DIAGNOSIS — D64.9 ANEMIA, UNSPECIFIED: ICD-10-CM

## 2024-02-20 DIAGNOSIS — Z87.438 PERSONAL HISTORY OF OTHER DISEASES OF MALE GENITAL ORGANS: ICD-10-CM

## 2024-02-20 LAB
ADD ON TEST-SPECIMEN IN LAB: SIGNIFICANT CHANGE UP
ADD ON TEST-SPECIMEN IN LAB: SIGNIFICANT CHANGE UP
ANION GAP SERPL CALC-SCNC: 9 MMOL/L — SIGNIFICANT CHANGE UP (ref 5–17)
ANISOCYTOSIS BLD QL: SLIGHT — SIGNIFICANT CHANGE UP
APPEARANCE UR: CLEAR — SIGNIFICANT CHANGE UP
BACTERIA # UR AUTO: NEGATIVE /HPF — SIGNIFICANT CHANGE UP
BASOPHILS # BLD AUTO: 0 K/UL — SIGNIFICANT CHANGE UP (ref 0–0.2)
BASOPHILS NFR BLD AUTO: 0 % — SIGNIFICANT CHANGE UP (ref 0–2)
BILIRUB UR-MCNC: NEGATIVE — SIGNIFICANT CHANGE UP
BLD GP AB SCN SERPL QL: NEGATIVE — SIGNIFICANT CHANGE UP
BLD GP AB SCN SERPL QL: NEGATIVE — SIGNIFICANT CHANGE UP
BUN SERPL-MCNC: 25 MG/DL — HIGH (ref 7–23)
CALCIUM SERPL-MCNC: 9.2 MG/DL — SIGNIFICANT CHANGE UP (ref 8.4–10.5)
CAST: 2 /LPF — SIGNIFICANT CHANGE UP (ref 0–4)
CHLORIDE SERPL-SCNC: 96 MMOL/L — SIGNIFICANT CHANGE UP (ref 96–108)
CO2 SERPL-SCNC: 28 MMOL/L — SIGNIFICANT CHANGE UP (ref 22–31)
COLOR SPEC: YELLOW — SIGNIFICANT CHANGE UP
CREAT SERPL-MCNC: 0.96 MG/DL — SIGNIFICANT CHANGE UP (ref 0.5–1.3)
CRP SERPL-MCNC: 141.2 MG/L — HIGH (ref 0–4)
DACRYOCYTES BLD QL SMEAR: SLIGHT — SIGNIFICANT CHANGE UP
DIFF PNL FLD: ABNORMAL
EGFR: 76 ML/MIN/1.73M2 — SIGNIFICANT CHANGE UP
EOSINOPHIL # BLD AUTO: 0 K/UL — SIGNIFICANT CHANGE UP (ref 0–0.5)
EOSINOPHIL NFR BLD AUTO: 0 % — SIGNIFICANT CHANGE UP (ref 0–6)
ERYTHROCYTE [SEDIMENTATION RATE] IN BLOOD: 42 MM/HR — HIGH
GLUCOSE SERPL-MCNC: 122 MG/DL — HIGH (ref 70–99)
GLUCOSE UR QL: NEGATIVE MG/DL — SIGNIFICANT CHANGE UP
HCT VFR BLD CALC: 31.4 % — LOW (ref 39–50)
HGB BLD-MCNC: 9.8 G/DL — LOW (ref 13–17)
HYPOCHROMIA BLD QL: SLIGHT — SIGNIFICANT CHANGE UP
KETONES UR-MCNC: NEGATIVE MG/DL — SIGNIFICANT CHANGE UP
LACTATE SERPL-SCNC: 1.2 MMOL/L — SIGNIFICANT CHANGE UP (ref 0.5–2)
LEUKOCYTE ESTERASE UR-ACNC: ABNORMAL
LYMPHOCYTES # BLD AUTO: 0.64 K/UL — LOW (ref 1–3.3)
LYMPHOCYTES # BLD AUTO: 2.6 % — LOW (ref 13–44)
MANUAL SMEAR VERIFICATION: SIGNIFICANT CHANGE UP
MCHC RBC-ENTMCNC: 25.6 PG — LOW (ref 27–34)
MCHC RBC-ENTMCNC: 31.2 GM/DL — LOW (ref 32–36)
MCV RBC AUTO: 82 FL — SIGNIFICANT CHANGE UP (ref 80–100)
MICROCYTES BLD QL: SLIGHT — SIGNIFICANT CHANGE UP
MONOCYTES # BLD AUTO: 0.86 K/UL — SIGNIFICANT CHANGE UP (ref 0–0.9)
MONOCYTES NFR BLD AUTO: 3.5 % — SIGNIFICANT CHANGE UP (ref 2–14)
NEUTROPHILS # BLD AUTO: 23.01 K/UL — HIGH (ref 1.8–7.4)
NEUTROPHILS NFR BLD AUTO: 93.9 % — HIGH (ref 43–77)
NITRITE UR-MCNC: NEGATIVE — SIGNIFICANT CHANGE UP
OVALOCYTES BLD QL SMEAR: SLIGHT — SIGNIFICANT CHANGE UP
PH UR: 6.5 — SIGNIFICANT CHANGE UP (ref 5–8)
PLAT MORPH BLD: NORMAL — SIGNIFICANT CHANGE UP
PLATELET # BLD AUTO: 441 K/UL — HIGH (ref 150–400)
POIKILOCYTOSIS BLD QL AUTO: SLIGHT — SIGNIFICANT CHANGE UP
POLYCHROMASIA BLD QL SMEAR: SLIGHT — SIGNIFICANT CHANGE UP
POTASSIUM SERPL-MCNC: 3.7 MMOL/L — SIGNIFICANT CHANGE UP (ref 3.5–5.3)
POTASSIUM SERPL-SCNC: 3.7 MMOL/L — SIGNIFICANT CHANGE UP (ref 3.5–5.3)
PROCALCITONIN SERPL-MCNC: 0.16 NG/ML — HIGH (ref 0.02–0.1)
PROT UR-MCNC: SIGNIFICANT CHANGE UP MG/DL
RBC # BLD: 3.83 M/UL — LOW (ref 4.2–5.8)
RBC # FLD: 15.2 % — HIGH (ref 10.3–14.5)
RBC BLD AUTO: ABNORMAL
RBC CASTS # UR COMP ASSIST: 454 /HPF — HIGH (ref 0–4)
RH IG SCN BLD-IMP: POSITIVE — SIGNIFICANT CHANGE UP
RH IG SCN BLD-IMP: POSITIVE — SIGNIFICANT CHANGE UP
SODIUM SERPL-SCNC: 133 MMOL/L — LOW (ref 135–145)
SP GR SPEC: 1.02 — SIGNIFICANT CHANGE UP (ref 1–1.03)
SQUAMOUS # UR AUTO: 6 /HPF — HIGH (ref 0–5)
UROBILINOGEN FLD QL: 1 MG/DL — SIGNIFICANT CHANGE UP (ref 0.2–1)
WBC # BLD: 24.51 K/UL — HIGH (ref 3.8–10.5)
WBC # FLD AUTO: 24.51 K/UL — HIGH (ref 3.8–10.5)
WBC UR QL: 9 /HPF — HIGH (ref 0–5)

## 2024-02-20 PROCEDURE — 99223 1ST HOSP IP/OBS HIGH 75: CPT

## 2024-02-20 PROCEDURE — 93010 ELECTROCARDIOGRAM REPORT: CPT

## 2024-02-20 PROCEDURE — 99285 EMERGENCY DEPT VISIT HI MDM: CPT

## 2024-02-20 PROCEDURE — 71045 X-RAY EXAM CHEST 1 VIEW: CPT | Mod: 26

## 2024-02-20 RX ORDER — FINASTERIDE 5 MG/1
5 TABLET, FILM COATED ORAL DAILY
Refills: 0 | Status: DISCONTINUED | OUTPATIENT
Start: 2024-02-20 | End: 2024-02-29

## 2024-02-20 RX ORDER — ATORVASTATIN CALCIUM 80 MG/1
10 TABLET, FILM COATED ORAL AT BEDTIME
Refills: 0 | Status: DISCONTINUED | OUTPATIENT
Start: 2024-02-20 | End: 2024-02-29

## 2024-02-20 RX ORDER — LANOLIN ALCOHOL/MO/W.PET/CERES
3 CREAM (GRAM) TOPICAL AT BEDTIME
Refills: 0 | Status: DISCONTINUED | OUTPATIENT
Start: 2024-02-20 | End: 2024-02-29

## 2024-02-20 RX ORDER — SENNA PLUS 8.6 MG/1
2 TABLET ORAL AT BEDTIME
Refills: 0 | Status: DISCONTINUED | OUTPATIENT
Start: 2024-02-20 | End: 2024-02-29

## 2024-02-20 RX ORDER — FINASTERIDE 5 MG/1
1 TABLET, FILM COATED ORAL
Refills: 0 | DISCHARGE

## 2024-02-20 RX ORDER — DOXAZOSIN MESYLATE 4 MG
4 TABLET ORAL AT BEDTIME
Refills: 0 | Status: DISCONTINUED | OUTPATIENT
Start: 2024-02-20 | End: 2024-02-29

## 2024-02-20 RX ORDER — ENOXAPARIN SODIUM 100 MG/ML
30 INJECTION SUBCUTANEOUS EVERY 24 HOURS
Refills: 0 | Status: DISCONTINUED | OUTPATIENT
Start: 2024-02-20 | End: 2024-02-21

## 2024-02-20 RX ORDER — SODIUM CHLORIDE 9 MG/ML
1000 INJECTION INTRAMUSCULAR; INTRAVENOUS; SUBCUTANEOUS ONCE
Refills: 0 | Status: COMPLETED | OUTPATIENT
Start: 2024-02-20 | End: 2024-02-20

## 2024-02-20 RX ORDER — SODIUM CHLORIDE 9 MG/ML
500 INJECTION INTRAMUSCULAR; INTRAVENOUS; SUBCUTANEOUS ONCE
Refills: 0 | Status: COMPLETED | OUTPATIENT
Start: 2024-02-20 | End: 2024-02-20

## 2024-02-20 RX ORDER — ACETAMINOPHEN 500 MG
650 TABLET ORAL EVERY 6 HOURS
Refills: 0 | Status: DISCONTINUED | OUTPATIENT
Start: 2024-02-20 | End: 2024-02-29

## 2024-02-20 RX ADMIN — Medication 4 MILLIGRAM(S): at 22:21

## 2024-02-20 RX ADMIN — FINASTERIDE 5 MILLIGRAM(S): 5 TABLET, FILM COATED ORAL at 22:21

## 2024-02-20 RX ADMIN — SODIUM CHLORIDE 1000 MILLILITER(S): 9 INJECTION INTRAMUSCULAR; INTRAVENOUS; SUBCUTANEOUS at 17:16

## 2024-02-20 RX ADMIN — SENNA PLUS 2 TABLET(S): 8.6 TABLET ORAL at 22:21

## 2024-02-20 RX ADMIN — SODIUM CHLORIDE 250 MILLILITER(S): 9 INJECTION INTRAMUSCULAR; INTRAVENOUS; SUBCUTANEOUS at 22:26

## 2024-02-20 RX ADMIN — ATORVASTATIN CALCIUM 10 MILLIGRAM(S): 80 TABLET, FILM COATED ORAL at 22:22

## 2024-02-20 NOTE — ED ADULT TRIAGE NOTE - RESPIRATORY RATE (BREATHS/MIN)
LVM for patient to call office for info on surgical process.  Left office info to call back [phone]
18

## 2024-02-20 NOTE — ED ADULT NURSE NOTE - OBJECTIVE STATEMENT
Pt is a 87y/o M presenting to the ED w/ c/o of generalized weakness, fatigue, decreased eating/drinking, constipation, worsening inability to walk, SOB upon standing/walker xmultiple weeks. Pt endorses PMHx lung CA (drain present on R side, site C/D/I), prostate issues, -thinners, 2 recent falls (states fell onto L ribs last week, -LOC, -head strike, -thinners, did not present to ER/PCP). Pt denies CP, SOB, fever/chills, N/V/D, pain, dizziness/lightheadedness. Pt A/Ox3, speaking in clear/complete sentences. Respirations easy/even and unlabored on RA, diminished R-sided lung fields. Pt w/ walker labeled @ bedside. Pt placed in yellow gown, on continuous pulse ox. IV placed, labs drawn. Sister @ bedside.

## 2024-02-20 NOTE — H&P ADULT - PROBLEM SELECTOR PLAN 5
Last seen urologist prior to COVID, per HIE was seen in 2017. Takes terazosin 5mg and finasteride 5mg at home. Reports chronic nocturia and polyuria that has not particularly worsened.  - c/w home meds

## 2024-02-20 NOTE — ED PROVIDER NOTE - OBJECTIVE STATEMENT
88M PMHx lung CA (drain present on R side, prostate issues. presenting for weakness. normally ambulates with a walker but past 3 days has been unable to. too fatigued to eat. has been losing weight. feels sob when he tries to ambulate. +urinary frequency. +constipation.

## 2024-02-20 NOTE — ED PROVIDER NOTE - PHYSICAL EXAMINATION
General: Awake, alert and oriented. tired appearing, cachectic  Skin:  Appropriate color for ethnicity  HENMT: head normocephalic and atraumatic  EYES: Conjunctiva clear. nonicteric sclera  Cardiac: well perfused, s1, s2  Respiratory: breathing comfortably on room air, ctab. catheter in place in right lateral chest area.   Abdominal: nondistended, soft, notnender  MSK:  no visualized external signs of trauma. no apparent deficits in ROM of any extremity  Neurological: The patient is awake, alert and oriented with normal speech. CN 2-12 grossly intact. no apparent deficits. Memory is normal and thought process is intact. moving all extremities spontaneously though weakly  Psychiatric: Appropriate mood and affect. Good judgement and insight

## 2024-02-20 NOTE — H&P ADULT - ATTENDING COMMENTS
89 yo M with PMHx lung SCC, BPH, peripheral neuropathy, anxiety px from home with 2-week hx of progressive weakness and dyspnea on exertion admitted for failure to thrive in setting of known advanced malignancy.      #Squamous cell carcinoma of the lung – Px with 2-week hx of progressively worsening weakness, dyspnea on exertion, fatigue, and decreased appetite. Currently, afebrile. HR . SBP 90-110s. RR 17-18. SpO2 97% on RA at rest. WBC 24.51. Neutrophil predominant. No bandemia. .2. Lactate 1.2. CXR with RLL consolidation/pleural effusion with PleurX in place, similar to previous CXR in 1/2024. Current px consistent with likely failure to thrive in setting of known advanced malignancy. Low suspicion for active infection. Monitor off abx. Pending chemotx/XRT outpatient. Follows with Dr. Lawrence (Pulm) and Dr. Fletcher (Onc) outpatient. Pulmonary consult. Oncology consult. Nutrition consult.      Agree with remainder of resident plan as above.

## 2024-02-20 NOTE — H&P ADULT - HISTORY OF PRESENT ILLNESS
88M PMH squamous cell lung cancer, BPH, and anxiety presenting with progressive weakness, dyspnea on exertion, fatigue, and weight loss x2 weeks. Pt states he was diagnosed with lung cancer around 4 months ago (10/2023) after presenting to his PCP with hemoptysis. Follows with Dr Lawrence outpatient, who he most recently saw on 1/17/24, thoracentesis was done and pleurx was placed for large R sided pleural effusion. During that visit, pt also complained of fatigue and loss of appetite. However, reports that these symptoms have rapidly progressed over the past 2 weeks, during which he lost almost 20 pounds. Pt lives alone in an apartment in [place], without any HHA, however has home PT. He called his PCP Dr Hernandez on Monday 2/19/24 due to these sx, and was instructed to present to ER. ROS +urinary frequency, nocturia, constipation, night sweats, and productive cough with brown/green phlegm. Reports having night sweats for >1 year, noticed that he wets several bedsheets. Denies pain on urination and states polyuria is due to his prostate issues. He was reportedly supposed to start radiation therapy for his lung cancer, however has not received any treatment thus far. Follows with rad onc Dr. Hubbard at [facility].     Pt is a prior smoker, quit in 1976 after smoking for 20 years. He reports he has a sister named Marion in [place], but no friends or family in the area. Does not have official HCP, however he states it would probably be his sister. States that he would like all interventions done, or "whatever is convenient at that time."    ED Course  VS T 98.7 HR 60 /78 RR 17  Labs WBC 24.51 Hgb 9.8 Plt 441 Neut 93.9% Na 133 BUN 25 Cr 0.96   UA Large blood,   Imaging None  Interventions 1000cc NS x1     88M PMH squamous cell lung cancer, BPH, peripheral neuropathy, and anxiety presenting with progressive weakness, dyspnea on exertion, fatigue, and weight loss x2 weeks. Pt states he was diagnosed with lung cancer around 4 months ago (10/2023) after presenting to his PCP with hemoptysis. Follows with Dr Lawrence outpatient, who he most recently saw on 1/17/24, thoracentesis was done and pleurx was placed for large R sided pleural effusion. During that visit, pt also complained of fatigue and loss of appetite. However, reports that these symptoms have rapidly progressed over the past 2 weeks, during which he lost almost 20 pounds. Pt lives alone in an apartment in [place], without any HHA, however has home PT. He called his PCP Dr Hernandez on Monday 2/19/24 due to these sx, and was instructed to present to ER. ROS +urinary frequency, nocturia, constipation, night sweats, and productive cough with brown/green phlegm. Reports having night sweats for >1 year, noticed that he wets several bedsheets. Denies pain on urination and states polyuria is due to his prostate issues. He was reportedly supposed to start radiation therapy for his lung cancer, however has not received any treatment thus far. Follows with rad onc Dr. Hubbard at [facility].     Pt is a prior smoker, quit in 1976 after smoking for 20 years. He reports he has a sister named Marion in [place], but no friends or family in the area. Does not have official HCP, however he states it would probably be his sister. States that he would like all interventions done, or "whatever is convenient at that time."    ED Course  VS T 98.7 HR 60 /78 RR 17  Labs WBC 24.51 Hgb 9.8 Plt 441 Neut 93.9% Na 133 BUN 25 Cr 0.96   UA Large blood,   Imaging CXR w/ R sided pleural effusion  Interventions 1000cc NS x1

## 2024-02-20 NOTE — H&P ADULT - NSHPSOCIALHISTORY_GEN_ALL_CORE
Wife passed in 2015 from metastatic cholangiocarcinoma. Lives alone in apartment in [place]. Retired . Prior smoker, quit in 1976.

## 2024-02-20 NOTE — ED ADULT TRIAGE NOTE - CHIEF COMPLAINT QUOTE
Pt brought in by family c/o generalized weakness and fatigue. pt typically walks with a walker but unable to ambulate today without assistance.

## 2024-02-20 NOTE — ED PROVIDER NOTE - CLINICAL SUMMARY MEDICAL DECISION MAKING FREE TEXT BOX
boderline tachycardic/hypotensive. improves with IVF. considering minimal po intake over 2 days likely in setting of dehydration. +pre-renal patter of azotemia. leukocytosis, will not cover with abx for now as afebrile. will admit for failure to thrive. Hatchet Flap Text: The defect edges were debeveled with a #15 scalpel blade.  Given the location of the defect, shape of the defect and the proximity to free margins a hatchet flap was deemed most appropriate.  Using a sterile surgical marker, an appropriate hatchet flap was drawn incorporating the defect and placing the expected incisions within the relaxed skin tension lines where possible.    The area thus outlined was incised deep to adipose tissue with a #15 scalpel blade.  The skin margins were undermined to an appropriate distance in all directions utilizing iris scissors. Following this, the designed flap was advanced and carried over into the primary defect and sutured into place. The secondary defect was also sutured into place.

## 2024-02-20 NOTE — H&P ADULT - ASSESSMENT
88M PMH lung cancer, BPH, neuropathy, and anxiety presenting with progressive weakness, dyspnea on exertion, and inability to care for self at home. Admitted to [facility] for further evaluation and management.

## 2024-02-20 NOTE — H&P ADULT - PROBLEM SELECTOR PLAN 4
Hgb on presentation 9.8, last hgb 11.7 1/2024. Pt denies hematuria, hematemesis, hemoptysis, or melena. +constipation. UA +RBC and large blood. Likely AOCD and nutritional deficiencies i/s/o progressing lung cancer and significant weight loss.  - f/u B12, folate, iron studies  - maintain active T&S  - heme/onc consult

## 2024-02-20 NOTE — H&P ADULT - NSHPLABSRESULTS_GEN_ALL_CORE
LABS                        9.8    24.51 )-----------( 441      ( 2024 16:09 )             31.4         133<L>  |  96  |  25<H>  ----------------------------<  122<H>  3.7   |  28  |  0.96    Ca    9.2      2024 16:09  Mg     2.2         TPro  6.1  /  Alb  2.4<L>  /  TBili  0.5  /  DBili  0.2  /  AST  23  /  ALT  20  /  AlkPhos  133<H>        Urinalysis Basic - ( 2024 17:34 )    Color: Yellow / Appearance: Clear / S.019 / pH: x  Gluc: x / Ketone: Negative mg/dL  / Bili: Negative / Urobili: 1.0 mg/dL   Blood: x / Protein: Trace mg/dL / Nitrite: Negative   Leuk Esterase: Trace / RBC: 454 /HPF / WBC 9 /HPF   Sq Epi: x / Non Sq Epi: 6 /HPF / Bacteria: Negative /HPF            Urinalysis with Rflx Culture (collected 24 @ 17:34)        IMAGING/EKG/ETC

## 2024-02-20 NOTE — H&P ADULT - PROBLEM SELECTOR PLAN 6
Progressive weight loss since lung cancer diagnosis, however reports >20 pound weight loss over past 2 weeks. Lives alone and unable to cook for self as becomes fatigued.   - regular diet  - nutrition consulted  - PT/OT eval  - palliative consulted  - monitor electrolytes closely for refeeding syndrome  - consider starting appetite stimulants

## 2024-02-20 NOTE — H&P ADULT - PROBLEM SELECTOR PLAN 1
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 Follows with Dr Lawrence and Dr Fletcher (oncologist). s/p bronchoscopy, pleurx placement, and thoracentesis outpatient. EBUS FNA bx and BAL 11/2023 +malignant cells. RLL bx +SCC. Pending radiation treatment, last seen 12/2023 and was determined to be a good candidate for chemoradiation at that time however did not receive tx due to large pleural effusion. Now p/w FTT, progressive dyspnea, fatigue, weight loss, night sweats.  - heme/onc consult in AM  - palliative consulted  - encourage PO intake  - PT/OT eval

## 2024-02-20 NOTE — ED ADULT NURSE NOTE - NSFALLHARMRISKINTERV_ED_ALL_ED

## 2024-02-20 NOTE — H&P ADULT - PROBLEM SELECTOR PLAN 3
Pt presenting w/ WBC 24 (last WBC ~15 1/2024), elevated neutrophils, as well as productive cough w/ yellow/brown phlegm. Pt reports night sweats, fatigue, and weight loss however denies fevers/chills at home. Mostly homebound and no recent sick contacts. s/p 1000cc NS bolus in ED. Lactate WNL. Platelets elevated, and i/s/o poor PO intake, suspect some hemoconcentration.  - f/u BCx  - f/u RVP  - f/u ESR, CRP

## 2024-02-20 NOTE — H&P ADULT - PROBLEM SELECTOR PLAN 8
F: s/p 1L NS  E: K>4 Mg>2  N: Regular  GI: None  DVT: Lovenox 30 subq  Dispo: [facility]

## 2024-02-20 NOTE — H&P ADULT - NSHPPHYSICALEXAM_GEN_ALL_CORE
PHYSICAL EXAM  General: NAD, cachectic elderly male   Head: pupils reactive, mucous membranes dry  Neck: Supple; no JVD  Respiratory: R sided crackles, b/l decreased breath sounded, RLL pleurx catheter in place w/o erythema, nontender  Cardiovascular: irregular rhythm/rate; S1/S2+, no murmurs, rubs gallops   Gastrointestinal: Soft; NTND; bowel sounds normal and present  Extremities: WWP; no edema/cyanosis  Neurological: A&Ox3, CNII-XII grossly intact; no obvious focal deficits

## 2024-02-20 NOTE — H&P ADULT - PROBLEM SELECTOR PLAN 2
CXR this admission with redemonstrated pleural effusion. Follows with Dr Lawrence outpatient. Currently comfortable on RA, however pt reports dyspnea on exertion and discomfort at pleurx site.   - pulm consult in AM  - consider CT Chest for further assessment of effusion

## 2024-02-21 ENCOUNTER — NON-APPOINTMENT (OUTPATIENT)
Age: 89
End: 2024-02-21

## 2024-02-21 ENCOUNTER — TRANSCRIPTION ENCOUNTER (OUTPATIENT)
Age: 89
End: 2024-02-21

## 2024-02-21 DIAGNOSIS — Z51.5 ENCOUNTER FOR PALLIATIVE CARE: ICD-10-CM

## 2024-02-21 DIAGNOSIS — Z71.89 OTHER SPECIFIED COUNSELING: ICD-10-CM

## 2024-02-21 DIAGNOSIS — K59.00 CONSTIPATION, UNSPECIFIED: ICD-10-CM

## 2024-02-21 DIAGNOSIS — R53.81 OTHER MALAISE: ICD-10-CM

## 2024-02-21 LAB
24R-OH-CALCIDIOL SERPL-MCNC: 56.1 NG/ML — SIGNIFICANT CHANGE UP (ref 30–80)
ANION GAP SERPL CALC-SCNC: 9 MMOL/L — SIGNIFICANT CHANGE UP (ref 5–17)
BASOPHILS # BLD AUTO: 0.04 K/UL — SIGNIFICANT CHANGE UP (ref 0–0.2)
BASOPHILS NFR BLD AUTO: 0.2 % — SIGNIFICANT CHANGE UP (ref 0–2)
BUN SERPL-MCNC: 20 MG/DL — SIGNIFICANT CHANGE UP (ref 7–23)
CALCIUM SERPL-MCNC: 8.3 MG/DL — LOW (ref 8.4–10.5)
CHLORIDE SERPL-SCNC: 101 MMOL/L — SIGNIFICANT CHANGE UP (ref 96–108)
CO2 SERPL-SCNC: 26 MMOL/L — SIGNIFICANT CHANGE UP (ref 22–31)
CREAT SERPL-MCNC: 0.81 MG/DL — SIGNIFICANT CHANGE UP (ref 0.5–1.3)
EGFR: 85 ML/MIN/1.73M2 — SIGNIFICANT CHANGE UP
EOSINOPHIL # BLD AUTO: 0.03 K/UL — SIGNIFICANT CHANGE UP (ref 0–0.5)
EOSINOPHIL NFR BLD AUTO: 0.1 % — SIGNIFICANT CHANGE UP (ref 0–6)
FERRITIN SERPL-MCNC: 1705 NG/ML — HIGH (ref 30–400)
FOLATE SERPL-MCNC: 8.8 NG/ML — SIGNIFICANT CHANGE UP
GLUCOSE SERPL-MCNC: 106 MG/DL — HIGH (ref 70–99)
HAPTOGLOB SERPL-MCNC: 356 MG/DL — HIGH (ref 34–200)
HCT VFR BLD CALC: 33.3 % — LOW (ref 39–50)
HGB BLD-MCNC: 10.5 G/DL — LOW (ref 13–17)
IMM GRANULOCYTES NFR BLD AUTO: 0.8 % — SIGNIFICANT CHANGE UP (ref 0–0.9)
IRON SATN MFR SERPL: 10 UG/DL — LOW (ref 45–165)
LDH SERPL L TO P-CCNC: 204 U/L — SIGNIFICANT CHANGE UP (ref 50–242)
LYMPHOCYTES # BLD AUTO: 1.06 K/UL — SIGNIFICANT CHANGE UP (ref 1–3.3)
LYMPHOCYTES # BLD AUTO: 4.7 % — LOW (ref 13–44)
MAGNESIUM SERPL-MCNC: 2 MG/DL — SIGNIFICANT CHANGE UP (ref 1.6–2.6)
MCHC RBC-ENTMCNC: 25.4 PG — LOW (ref 27–34)
MCHC RBC-ENTMCNC: 31.5 GM/DL — LOW (ref 32–36)
MCV RBC AUTO: 80.4 FL — SIGNIFICANT CHANGE UP (ref 80–100)
MONOCYTES # BLD AUTO: 1.01 K/UL — HIGH (ref 0–0.9)
MONOCYTES NFR BLD AUTO: 4.4 % — SIGNIFICANT CHANGE UP (ref 2–14)
NEUTROPHILS # BLD AUTO: 20.44 K/UL — HIGH (ref 1.8–7.4)
NEUTROPHILS NFR BLD AUTO: 89.8 % — HIGH (ref 43–77)
NRBC # BLD: 0 /100 WBCS — SIGNIFICANT CHANGE UP (ref 0–0)
PHOSPHATE SERPL-MCNC: 2.8 MG/DL — SIGNIFICANT CHANGE UP (ref 2.5–4.5)
PLATELET # BLD AUTO: 475 K/UL — HIGH (ref 150–400)
POTASSIUM SERPL-MCNC: 3.6 MMOL/L — SIGNIFICANT CHANGE UP (ref 3.5–5.3)
POTASSIUM SERPL-SCNC: 3.6 MMOL/L — SIGNIFICANT CHANGE UP (ref 3.5–5.3)
RBC # BLD: 4.14 M/UL — LOW (ref 4.2–5.8)
RBC # BLD: 4.14 M/UL — LOW (ref 4.2–5.8)
RBC # FLD: 15.6 % — HIGH (ref 10.3–14.5)
RETICS #: 58.8 K/UL — SIGNIFICANT CHANGE UP (ref 25–125)
RETICS/RBC NFR: 1.4 % — SIGNIFICANT CHANGE UP (ref 0.5–2.5)
SODIUM SERPL-SCNC: 136 MMOL/L — SIGNIFICANT CHANGE UP (ref 135–145)
TRANSFERRIN SERPL-MCNC: 99 MG/DL — LOW (ref 200–360)
VIT B12 SERPL-MCNC: >2000 PG/ML — HIGH (ref 232–1245)
WBC # BLD: 22.76 K/UL — HIGH (ref 3.8–10.5)
WBC # FLD AUTO: 22.76 K/UL — HIGH (ref 3.8–10.5)

## 2024-02-21 PROCEDURE — 99223 1ST HOSP IP/OBS HIGH 75: CPT

## 2024-02-21 PROCEDURE — 99222 1ST HOSP IP/OBS MODERATE 55: CPT

## 2024-02-21 PROCEDURE — 99233 SBSQ HOSP IP/OBS HIGH 50: CPT | Mod: GC

## 2024-02-21 PROCEDURE — 74019 RADEX ABDOMEN 2 VIEWS: CPT | Mod: 26

## 2024-02-21 RX ORDER — SIMETHICONE 80 MG/1
80 TABLET, CHEWABLE ORAL ONCE
Refills: 0 | Status: COMPLETED | OUTPATIENT
Start: 2024-02-21 | End: 2024-02-21

## 2024-02-21 RX ORDER — POLYETHYLENE GLYCOL 3350 17 G/17G
17 POWDER, FOR SOLUTION ORAL DAILY
Refills: 0 | Status: DISCONTINUED | OUTPATIENT
Start: 2024-02-21 | End: 2024-02-24

## 2024-02-21 RX ORDER — ENOXAPARIN SODIUM 100 MG/ML
40 INJECTION SUBCUTANEOUS EVERY 24 HOURS
Refills: 0 | Status: DISCONTINUED | OUTPATIENT
Start: 2024-02-22 | End: 2024-02-22

## 2024-02-21 RX ADMIN — ATORVASTATIN CALCIUM 10 MILLIGRAM(S): 80 TABLET, FILM COATED ORAL at 21:45

## 2024-02-21 RX ADMIN — FINASTERIDE 5 MILLIGRAM(S): 5 TABLET, FILM COATED ORAL at 12:57

## 2024-02-21 RX ADMIN — Medication 4 MILLIGRAM(S): at 21:45

## 2024-02-21 RX ADMIN — SIMETHICONE 80 MILLIGRAM(S): 80 TABLET, CHEWABLE ORAL at 21:45

## 2024-02-21 RX ADMIN — ENOXAPARIN SODIUM 30 MILLIGRAM(S): 100 INJECTION SUBCUTANEOUS at 12:57

## 2024-02-21 NOTE — PROGRESS NOTE ADULT - PROBLEM SELECTOR PLAN 5
The patient was complaining of constipation overnight. Started on Miralax, however, per nursing, the patient was given senna and had a large bowel movement, therefore, Miralax was held.   He is complaining of abdominal discomfort at this time, improved from admission  Plan:  - c/w senna at night   - Miralax PRN  - f/u abdominal radiograph

## 2024-02-21 NOTE — CONSULT NOTE ADULT - ASSESSMENT
I M    88 y o M PMH lung cancer, BPH, neuropathy, and anxiety presenting with progressive weakness, dyspnea on exertion, and inability to care for self at home. Admitted to [facility] for further evaluation and management.    Problem/Plan - 1:  ·  Problem: Squamous cell lung cancer.   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 Follows with Dr Lawrence and Dr Fletcher (oncologist). s/p bronchoscopy, pleurx placement, and thoracentesis outpatient. EBUS FNA bx and BAL 11/2023 +malignant cells. RLL bx +SCC. Pending radiation treatment, last seen 12/2023 and was determined to be a good candidate for chemoradiation at that time however did not receive tx due to large pleural effusion. Now p/w FTT, progressive dyspnea, fatigue, weight loss, night sweats.  - heme/onc consult in AM  - palliative consulted  - encourage PO intake  - PT/OT eval.    Problem/Plan - 2:  ·  Problem: Pleural effusion.   ·  Plan: CXR this admission with redemonstrated pleural effusion. Follows with Dr Lawrence outpatient. Currently comfortable on RA, however pt reports dyspnea on exertion and discomfort at pleurx site.   - pulm consult in AM  - consider CT Chest for further assessment of effusion.    Problem/Plan - 3:  ·  Problem: Leukocytosis.   ·  Plan: Pt presenting w/ WBC 24 (last WBC ~15 1/2024), elevated neutrophils, as well as productive cough w/ yellow/brown phlegm. Pt reports night sweats, fatigue, and weight loss however denies fevers/chills at home. Mostly homebound and no recent sick contacts. s/p 1000cc NS bolus in ED. Lactate WNL. Platelets elevated, and i/s/o poor PO intake, suspect some hemoconcentration.  - f/u BCx  - f/u RVP  - f/u ESR, CRP.    Problem/Plan - 4:  ·  Problem: Normocytic anemia.   ·  Plan: Hgb on presentation 9.8, last hgb 11.7 1/2024. Pt denies hematuria, hematemesis, hemoptysis, or melena. +constipation. UA +RBC and large blood. Likely AOCD and nutritional deficiencies i/s/o progressing lung cancer and significant weight loss.  - f/u B12, folate, iron studies  - maintain active T&S  - heme/onc consult.    Problem/Plan - 5:  ·  Problem: History of BPH.   ·  Plan: Last seen urologist prior to COVID, per HIE was seen in 2017. Takes terazosin 5mg and finasteride 5mg at home. Reports chronic nocturia and polyuria that has not particularly worsened.  - c/w home meds.    Problem/Plan - 6:  ·  Problem: Failure to thrive in adult.   ·  Plan: Progressive weight loss since lung cancer diagnosis, however reports >20 pound weight loss over past 2 weeks. Lives alone and unable to cook for self as becomes fatigued.   - regular diet  - nutrition consulted  - PT/OT eval  - palliative consulted  - monitor electrolytes closely for refeeding syndrome  - consider starting appetite stimulants.    Problem/Plan - 7:  ·  Problem: Protein calorie malnutrition.   ·  Plan: BMI 18, >20 pound weight loss over past 2 weeks.  - nutrition consulted.    Problem/Plan - 8:  ·  Problem: Prophylactic measure.   ·  Plan: F: s/p 1L NS  E: K>4 Mg>2  N: Regular  GI: None  DVT: Lovenox 30 subq  Dispo: [facility].      Attestation Statements:  I have personally seen and examined the patient.  I fully participated in the care of this patient.  I have made amendments to the documentation where necessary, and agree with the history, physical exam, and plan as documented by the Resident.     89 yo M with PMHx lung SCC, BPH, peripheral neuropathy, anxiety px from home with 2-week hx of progressive weakness and dyspnea on exertion admitted for failure to thrive in setting of known advanced malignancy.      #Squamous cell carcinoma of the lung – Px with 2-week hx of progressively worsening weakness, dyspnea on exertion, fatigue, and decreased appetite. Currently, afebrile. HR . SBP 90-110s. RR 17-18. SpO2 97% on RA at rest. WBC 24.51. Neutrophil predominant. No bandemia. .2. Lactate 1.2. CXR with RLL consolidation/pleural effusion with PleurX in place, similar to previous CXR in 1/2024. Current px consistent with likely failure to thrive in setting of known advanced malignancy. Low suspicion for active infection. Monitor off abx. Pending chemotx/XRT outpatient. Follows with Dr. Lawrence (Pulm) and Dr. Fletcher (Onc) outpatient. Pulmonary consult. Oncology consult. Nutrition consult.

## 2024-02-21 NOTE — DIETITIAN INITIAL EVALUATION ADULT - PROBLEM SELECTOR PLAN 7
BMI 18, >20 pound weight loss over past 2 weeks.  - nutrition consulted Complex Repair And O-L Flap Text: The defect edges were debeveled with a #15 scalpel blade.  The primary defect was closed partially with a complex linear closure.  Given the location of the remaining defect, shape of the defect and the proximity to free margins an O-L flap was deemed most appropriate for complete closure of the defect.  Using a sterile surgical marker, an appropriate flap was drawn incorporating the defect and placing the expected incisions within the relaxed skin tension lines where possible.    The area thus outlined was incised deep to adipose tissue with a #15 scalpel blade.  The skin margins were undermined to an appropriate distance in all directions utilizing iris scissors.

## 2024-02-21 NOTE — DIETITIAN INITIAL EVALUATION ADULT - ENTER FROM (ML/KG)
Name_______________________________________Printed:____________________  Date and time of surgery__12/29/21______0730________________Arrival Time:__0600____MAIN__________   1. The instructions given regarding when and if a patient needs to stop oral intake prior to surgery varies. Follow the specific instructions you were given                  __X_Nothing to eat or to drink after Midnight the night before.                   ____Carbo loading or ERAS instructions will be given to select patients-if you have been given those instructions -please do the following                           The evening before your surgery after dinner before midnight drink 40 ounces of gatorade. If you are diabetic use sugar free. The morning of surgery drink 40 ounces of water. This needs to be finished 3 hours prior to your surgery start time. 2. Take the following pills with a small sip of water on the morning of surgery___Carvedilol_____Synthroid___________________________________________                  Do not take blood pressure medications ending in pril or sartan the akilah prior to surgery or the morning of surgery_   3. Aspirin, Ibuprofen, Advil, Naproxen, Vitamin E and other Anti-inflammatory products and supplements should be stopped for 5 -7days before surgery or as directed by your physician. 4. Check with your Doctor regarding stopping Plavix, Coumadin,Eliquis, Lovenox,Effient,Pradaxa,Xarelto, Fragmin or other blood thinners and follow their instructions. 5. Do not smoke, and do not drink any alcoholic beverages 24 hours prior to surgery. This includes NA Beer. Refrain from the usage of any recreational drugs. 6. You may brush your teeth and gargle the morning of surgery. DO NOT SWALLOW WATER   7. You MUST make arrangements for a responsible adult to stay on site while you are here and take you home after your surgery. You will not be allowed to leave alone or drive yourself home.   It is strongly suggested someone stay with you the first 24 hrs. Your surgery will be cancelled if you do not have a ride home. 8. A parent/legal guardian must accompany a child scheduled for surgery and plan to stay at the hospital until the child is discharged. Please do not bring other children with you. 9. Please wear simple, loose fitting clothing to the hospital.  Kristeen Cava not bring valuables (money, credit cards, checkbooks, etc.) Do not wear any makeup (including no eye makeup) or nail polish on your fingers or toes. 10. DO NOT wear any jewelry or piercings on day of surgery. All body piercing jewelry must be removed. 11. If you have ___dentures, they will be removed before going to the OR; we will provide you a container. If you wear ___contact lenses or ___glasses, they will be removed; please bring a case for them. 12. Please see your family doctor/pediatrician for a history & physical and/or concerning medications. Bring any test results/reports from your physician's office. PCP__________________Phone___________H&P Appt. Date________             13 If you  have a Living Will and Durable Power of  for Healthcare, please bring in a copy. 15. Notify your Surgeon if you develop any illness between now and surgery  time, cough, cold, fever, sore throat, nausea, vomiting, etc.  Please notify your surgeon if you experience dizziness, shortness of breath or blurred vision between now & the time of your surgery             15. DO NOT shave your operative site 96 hours prior to surgery. For face & neck surgery, men may use an electric razor 48 hours prior to surgery. 16. Shower the night before or morning of surgery using an antibacterial soap or as you have been instructed. 17. To provide excellent care visitors will be limited to one in the room at any given time. 18.  Please bring picture ID and insurance card.              19.  Visit our web site for additional information:  [email or web address]              20.During flu season no children under the age of 15 are permitted in the hospital for the safety of all patients. 21. If you take a long acting insulin in the evening only  take half of your usual  dose the night  before your procedure              22. If you use a c-pap please bring DOS if staying overnight,             23.For your convenience [facility] has a pharmacy on site to fill your prescriptions. 24. If you use oxygen and have a portable tank please bring it  with you the DOS             25. Bring a complete list of all your medications with name and dose include any supplements. 26. Other__________________________________________   *Please call pre admission testing if you any further questions   [address] [facility] [place]. [place]  [phone]   [facility] [address] CHRISTIANO COUGHLIN   [phone]           COVID TESTING    _X__ Covid test to be done 3-5 days prior to scheduled surgery -patient aware they are REQUIRED to bring a copy of the negative result DOS-if they receive a positive result to notify their surgeon         If known - indicate where patient is getting covid test done ____Unsure where to get it. Stated did not know they had to get it. Encouraged to go to Urgent care or call [facility]_to get a Rapid Covid instructed they must bring negative results day of surgery. They( patient and daughter) voiced understanding.______________________________________________________    ___ Rapid - DOS    ___ Other___________________________________      Angelique Edwards POLICY(subject to change)    There is a one visitor policy at [facility] for all surgeries and endoscopies. Whether the visitor can stay or will be asked to wait in the car will depend on the current policy and if social distancing can be maintained. The policy is subject to change at any time. Please make sure the visitor has a cell phone that is on,charged and able to accept calls, as this may be the way that the staff communicates with them. Pain management is NO VISITOR policyThe patients ride is expected to remain in the car with a cell phone for communication. If the ride is leaving the hospital grounds please make sure they are back in time for pickup. Have the patient inform the staff on arrival what their rides plans are while the patient is in the facility. At the MAIN there is one visitor allowed. Please note that the visitor policy is subject to change. All above information reviewed with patient in person or by phone. Patient verbalizes understanding. All questions and concerns addressed.                                                                                                  Patient/Rep__Patient and daughter-_Doris_________________                                                                                                                                    PRE OP INSTRUCTIONS 25

## 2024-02-21 NOTE — DISCHARGE NOTE NURSING/CASE MANAGEMENT/SOCIAL WORK - PATIENT PORTAL LINK FT
You can access the FollowMyHealth Patient Portal offered by [facility] by registering at the following website: [email or web address]. By joining [employer]’s FollowMyHealth portal, you will also be able to view your health information using other applications (apps) compatible with our system.

## 2024-02-21 NOTE — CONSULT NOTE ADULT - ASSESSMENT
----- Message from Luis Barboza MD sent at 1/4/2020  3:38 PM CST -----  Call patient.  Blood sugar was 180, A1c was fine at 6.6, kidney function are normal liver enzymes are slightly elevated, cholesterol looks good at 181 triglycerides slightly elevated  256, is time to reduce the carbs in bread in your diet.  Reduce potatoes rice and pasta.  Recheck A1c and CMP in 3 months.   88M PMH lung cancer, BPH, neuropathy, and anxiety presenting with progressive weakness, dyspnea on exertion, and inability to care for self at home. Admitted to [facility] for further evaluation and management. Palliative care consulted for complex medical decision making in the setting of serious illness

## 2024-02-21 NOTE — DIETITIAN INITIAL EVALUATION ADULT - ADD RECOMMEND
1. Continue Regular diet, recommend Ensure Enlive 1x/day (350kcal, 20g protein per serving)   2. Encourage and monitor PO intake, honor preferences as able   3. Monitor labs, wt trends, GI function, and skin integrity   4. Pain and bowel regimen per team   5. RD to remain available for additional nutrition interventions and diet edu as needed  1. Continue Regular diet, recommend Ensure Enlive 2x/day (350kcal, 20g protein per serving)   2. Encourage and monitor PO intake, honor preferences as able   3. Monitor labs, wt trends, GI function, and skin integrity   4. Pain and bowel regimen per team   5. RD to remain available for additional nutrition interventions and diet edu as needed

## 2024-02-21 NOTE — DIETITIAN INITIAL EVALUATION ADULT - PERTINENT LABORATORY DATA
02-21    136  |  101  |  20  ----------------------------<  106<H>  3.6   |  26  |  0.81    Ca    8.3<L>      21 Feb 2024 05:30  Phos  2.8     02-21  Mg     2.0     02-21    TPro  6.1  /  Alb  2.4<L>  /  TBili  0.5  /  DBili  0.2  /  AST  23  /  ALT  20  /  AlkPhos  133<H>  02-20

## 2024-02-21 NOTE — CONSULT NOTE ADULT - ATTENDING COMMENTS
Patient seen and examined.  Agree with fellow note as above.  Patient with advanced lung cancer.  Palliative care consulted to assist with GOC discussions and symptom management.

## 2024-02-21 NOTE — DIETITIAN NUTRITION RISK NOTIFICATION - ADDITIONAL COMMENTS/DIETITIAN RECOMMENDATIONS
Health Maintenance Summary     Topic Due On Due Status Completed On    MAMMOGRAM - BREAST CANCER SCREENING Sep 11, 2017 Overdue Sep 11, 2015    Colorectal Cancer Screening - Colonoscopy Dec 27, 1999 Overdue     Osteoporosis Screening Dec 27, 2014 Overdue     Immunization - Pneumococcal Dec 27, 2014 Overdue     Diabetes Eye Exam Dec 27, 1967 Overdue     Glycohemoglobin A1C  (Diabetes Sugar)  Sep 29, 2015 Overdue Mar 29, 2015    Microalbumin  (Diabetes Urine Test)  Dec 27, 1967 Overdue     GFR  (Kidney Function Test)  Mar 29, 2016 Overdue Mar 29, 2015    Immunization - TDAP Pregnancy  Hidden     Diabetes Foot Exam  Dec 27, 1967 Overdue     Medicare Wellness Visit Aug 4, 2016 Overdue Aug 4, 2015    IMMUNIZATION - DTaP/Tdap/Td Dec 27, 1968 Overdue     Immunization-Influenza Sep 1, 2017 Overdue     Hepatitis C Screening Dec 27, 2000 Overdue           Patient is due for topics as listed above, she wishes to defer to PCP .           1. Continue Regular diet, recommend Ensure Enlive 1x/day (350kcal, 20g protein per serving)   2. Encourage and monitor PO intake, honor preferences as able   3. Monitor labs, wt trends, GI function, and skin integrity   4. Pain and bowel regimen per team   5. RD to remain available for additional nutrition interventions and diet edu as needed   1. Continue Regular diet, recommend Ensure Enlive 2x/day (350kcal, 20g protein per serving)   2. Encourage and monitor PO intake, honor preferences as able   3. Monitor labs, wt trends, GI function, and skin integrity   4. Pain and bowel regimen per team   5. RD to remain available for additional nutrition interventions and diet edu as needed

## 2024-02-21 NOTE — DIETITIAN INITIAL EVALUATION ADULT - OTHER INFO
88M PMH squamous cell lung cancer, BPH, peripheral neuropathy, and anxiety presenting with progressive weakness, dyspnea on exertion, fatigue, and weight loss x2 weeks. Pt states he was diagnosed with lung cancer around 4 months ago (10/2023) after presenting to his PCP with hemoptysis. Follows with Dr Lawrence outpatient, who he most recently saw on 1/17/24, thoracentesis was done and pleurx was placed for large R sided pleural effusion. During that visit, pt also complained of fatigue and loss of appetite. However, reports that these symptoms have rapidly progressed over the past 2 weeks, during which he lost almost 20 pounds. Pt lives alone in an apartment in [place], without any HHA, however has home PT. He called his PCP Dr Hernandez on Monday 2/19/24 due to these sx, and was instructed to present to ER. ROS +urinary frequency, nocturia, constipation, night sweats, and productive cough with brown/green phlegm. Reports having night sweats for >1 year, noticed that he wets several bedsheets. Denies pain on urination and states polyuria is due to his prostate issues. He was reportedly supposed to start radiation therapy for his lung cancer, however has not received any treatment thus far. Follows with rad onc Dr. Hubbard at [facility].     Patient seen at bedside for nutrition assessment, however interview limited as pt refused to participate at this time. Subjective information obtained from EMR. Current diet order: Regular. NKFA documented. No difficulty chewing/swallowing reported. Reports fair appetite, consumed 100% yogurt and fruit for breakfast this AM. Per H&P, pt with decreased appetite and PO intake x 2 weeks. Dosing weight: 115#, BMI 18, UBW unknown at this time, however pt with 20# weight loss x 2 weeks per H&P. -20#/15% x 2 weeks, clinically significant. No pressure injuries or edema documented. Noted with constipation, last BM 2/21 per EMR- on bowel regimen. Labs: Na previously 133 now WDL, glucose 106-122 x 24 hours. Unable to complete NFPE, however based on ASPEN guidelines, pt meets criteria for severe protein calorie malnutrition. See nutrition recommendations below.

## 2024-02-21 NOTE — CONSULT NOTE ADULT - SUBJECTIVE AND OBJECTIVE BOX
PULMONARY SERVICE INITIAL CONSULT NOTE    HPI:  88M PMH squamous cell lung cancer, BPH, peripheral neuropathy, and anxiety presenting with progressive weakness, dyspnea on exertion, fatigue, and weight loss x2 weeks. Pt states he was diagnosed with lung cancer around 4 months ago (10/2023) after presenting to his PCP with hemoptysis. Follows with Dr Lawrence outpatient, who he most recently saw on 1/17/24, thoracentesis was done and pleurx was placed for large R sided pleural effusion. During that visit, pt also complained of fatigue and loss of appetite. However, reports that these symptoms have rapidly progressed over the past 2 weeks, during which he lost almost 20 pounds. Pt lives alone in an apartment in [place], without any HHA, however has home PT. He called his PCP Dr Hernandez on Monday 2/19/24 due to these sx, and was instructed to present to ER. ROS +urinary frequency, nocturia, constipation, night sweats, and productive cough with brown/green phlegm. Reports having night sweats for >1 year, noticed that he wets several bedsheets. Denies pain on urination and states polyuria is due to his prostate issues. He was reportedly supposed to start radiation therapy for his lung cancer, however has not received any treatment thus far. Follows with rad onc Dr. Hubbard at [facility].     Pt is a prior smoker, quit in 1976 after smoking for 20 years. He reports he has a sister named Marion in [place], but no friends or family in the area. Does not have official HCP, however he states it would probably be his sister. States that he would like all interventions done, or "whatever is convenient at that time."    ED Course  VS T 98.7 HR 60 /78 RR 17  Labs WBC 24.51 Hgb 9.8 Plt 441 Neut 93.9% Na 133 BUN 25 Cr 0.96   UA Large blood,   Imaging CXR w/ R sided pleural effusion  Interventions 1000cc NS x1     (20 Feb 2024 19:34)    Additional pulmonary history: pulmonary consulted for known MPE with IPC placement by Dr. Lawrence as an outpatient. HPI as above. Has not recieved treatment yet. Persistent B type symptoms.     REVIEW OF SYSTEMS:  Constitutional: No fever, weight loss or fatigue  Eyes: No eye pain, visual disturbances, or discharge  ENMT:  No difficulty hearing, tinnitus, vertigo; No sinus or throat pain  Neck: No pain, stiffness or neck swelling  Respiratory: see HPI  Cardiovascular: No chest pain, palpitations, dizziness or leg swelling  Gastrointestinal: No abdominal or epigastric pain. No nausea, vomiting or hematemesis; No diarrhea or constipation. No melena or hematochezia.  Genitourinary: No dysuria, frequency, hematuria or incontinence  Neurological: No headaches, memory loss, loss of strength, numbness or tremors  Skin: No itching, burning, rashes or lesions   Lymph Nodes: No enlarged glands  Endocrine: No heat or cold intolerance; No hair loss  Musculoskeletal: No joint pain or swelling; No muscle, back or extremity pain  Psychiatric: No depression, anxiety, mood swings or difficulty sleeping  Heme/Lymph: No easy bruising or bleeding gums  Allergy and Immunologic: No hives or eczema    PAST MEDICAL & SURGICAL HISTORY:  Lung cancer      History of BPH      S/P rotator cuff repair      H/O carpal tunnel repair          FAMILY HISTORY:      SOCIAL HISTORY:  Smoking Status: [ ] Current, [ x ] Former, [ ] Never  Pack Years:    MEDICATIONS:  Pulmonary:    Antimicrobials:    Anticoagulants:  enoxaparin Injectable 30 milliGRAM(s) SubCutaneous every 24 hours    Onc:    GI/:  polyethylene glycol 3350 17 Gram(s) Oral daily  senna 2 Tablet(s) Oral at bedtime    Endocrine:  atorvastatin 10 milliGRAM(s) Oral at bedtime  finasteride 5 milliGRAM(s) Oral daily    Cardiac:  doxazosin 4 milliGRAM(s) Oral at bedtime    Other Medications:  acetaminophen     Tablet .. 650 milliGRAM(s) Oral every 6 hours PRN  melatonin 3 milliGRAM(s) Oral at bedtime PRN      Allergies    No Known Allergies    Intolerances        Vital Signs Last 24 Hrs  T(C): 36.7 (21 Feb 2024 06:03), Max: 37.6 (20 Feb 2024 17:10)  T(F): 98.1 (21 Feb 2024 06:03), Max: 99.7 (20 Feb 2024 17:10)  HR: 88 (21 Feb 2024 06:03) (78 - 100)  BP: 110/66 (21 Feb 2024 06:03) (90/57 - 126/76)  BP(mean): --  RR: 18 (21 Feb 2024 06:03) (17 - 18)  SpO2: 96% (21 Feb 2024 06:03) (96% - 97%)    Parameters below as of 21 Feb 2024 06:03  Patient On (Oxygen Delivery Method): room air            PHYSICAL EXAM:  Constitutional: chronically ill appearing, emaciated   Head: NC/AT  EENT: PERRL, anicteric sclera; oropharynx clear, MMM  Neck: supple, no appreciable JVD  Respiratory: CTA B/L; no W/R/R; R IPC site c/d/i  Cardiovascular: +S1/S2, RRR  Gastrointestinal: soft, NT/ND  Extremities: WWP; no edema, clubbing or cyanosis  Vascular: 2+ radial pulses B/L  Neurological: awake and alert; SCHMIDT    LABS:      CBC Full  -  ( 21 Feb 2024 05:30 )  WBC Count : 22.76 K/uL  RBC Count : 4.14 M/uL  Hemoglobin : 10.5 g/dL  Hematocrit : 33.3 %  Platelet Count - Automated : 475 K/uL  Mean Cell Volume : 80.4 fl  Mean Cell Hemoglobin : 25.4 pg  Mean Cell Hemoglobin Concentration : 31.5 gm/dL  Auto Neutrophil # : 20.44 K/uL  Auto Lymphocyte # : 1.06 K/uL  Auto Monocyte # : 1.01 K/uL  Auto Eosinophil # : 0.03 K/uL  Auto Basophil # : 0.04 K/uL  Auto Neutrophil % : 89.8 %  Auto Lymphocyte % : 4.7 %  Auto Monocyte % : 4.4 %  Auto Eosinophil % : 0.1 %  Auto Basophil % : 0.2 %    02-21    136  |  101  |  20  ----------------------------<  106<H>  3.6   |  26  |  0.81    Ca    8.3<L>      21 Feb 2024 05:30  Phos  2.8     02-21  Mg     2.0     02-21    TPro  6.1  /  Alb  2.4<L>  /  TBili  0.5  /  DBili  0.2  /  AST  23  /  ALT  20  /  AlkPhos  133<H>  02-20          Urinalysis Basic - ( 21 Feb 2024 05:30 )    Color: x / Appearance: x / SG: x / pH: x  Gluc: 106 mg/dL / Ketone: x  / Bili: x / Urobili: x   Blood: x / Protein: x / Nitrite: x   Leuk Esterase: x / RBC: x / WBC x   Sq Epi: x / Non Sq Epi: x / Bacteria: x                RADIOLOGY & ADDITIONAL STUDIES: PULMONARY SERVICE INITIAL CONSULT NOTE    HPI:  88M PMH squamous cell lung cancer, BPH, peripheral neuropathy, and anxiety presenting with progressive weakness, dyspnea on exertion, fatigue, and weight loss x2 weeks. Pt states he was diagnosed with lung cancer around 4 months ago (10/2023) after presenting to his PCP with hemoptysis. Follows with Dr Lawrence outpatient, who he most recently saw on 1/17/24, thoracentesis was done and pleurx was placed for large R sided pleural effusion. During that visit, pt also complained of fatigue and loss of appetite. However, reports that these symptoms have rapidly progressed over the past 2 weeks, during which he lost almost 20 pounds. Pt lives alone in an apartment in [place], without any HHA, however has home PT. He called his PCP Dr Hernandez on Monday 2/19/24 due to these sx, and was instructed to present to ER. ROS +urinary frequency, nocturia, constipation, night sweats, and productive cough with brown/green phlegm. Reports having night sweats for >1 year, noticed that he wets several bedsheets. Denies pain on urination and states polyuria is due to his prostate issues. He was reportedly supposed to start radiation therapy for his lung cancer, however has not received any treatment thus far. Follows with rad onc Dr. Hubbard at [facility].     Pt is a prior smoker, quit in 1976 after smoking for 20 years. He reports he has a sister named Marion in [place], but no friends or family in the area. Does not have official HCP, however he states it would probably be his sister. States that he would like all interventions done, or "whatever is convenient at that time."    ED Course  VS T 98.7 HR 60 /78 RR 17  Labs WBC 24.51 Hgb 9.8 Plt 441 Neut 93.9% Na 133 BUN 25 Cr 0.96   UA Large blood,   Imaging CXR w/ R sided pleural effusion  Interventions 1000cc NS x1     (20 Feb 2024 19:34)    Additional pulmonary history: pulmonary consulted for known MPE with IPC placement by Dr. Lawrence as an outpatient. HPI as above. Has not recieved treatment yet. Persistent B type symptoms.     REVIEW OF SYSTEMS:  Constitutional: No fever, weight loss or fatigue  Eyes: No eye pain, visual disturbances, or discharge  ENMT:  No difficulty hearing, tinnitus, vertigo; No sinus or throat pain  Neck: No pain, stiffness or neck swelling  Respiratory: see HPI  Cardiovascular: No chest pain, palpitations, dizziness or leg swelling  Gastrointestinal: No abdominal or epigastric pain. No nausea, vomiting or hematemesis; No diarrhea or constipation. No melena or hematochezia.  Genitourinary: No dysuria, frequency, hematuria or incontinence  Neurological: No headaches, memory loss, loss of strength, numbness or tremors  Skin: No itching, burning, rashes or lesions   Lymph Nodes: No enlarged glands  Endocrine: No heat or cold intolerance; No hair loss  Musculoskeletal: No joint pain or swelling; No muscle, back or extremity pain  Psychiatric: No depression, anxiety, mood swings or difficulty sleeping  Heme/Lymph: No easy bruising or bleeding gums  Allergy and Immunologic: No hives or eczema    PAST MEDICAL & SURGICAL HISTORY:  Lung cancer      History of BPH      S/P rotator cuff repair      H/O carpal tunnel repair          FAMILY HISTORY:      SOCIAL HISTORY:  Smoking Status: [ ] Current, [ x ] Former, [ ] Never  Pack Years:    MEDICATIONS:  Pulmonary:    Antimicrobials:    Anticoagulants:  enoxaparin Injectable 30 milliGRAM(s) SubCutaneous every 24 hours    Onc:    GI/:  polyethylene glycol 3350 17 Gram(s) Oral daily  senna 2 Tablet(s) Oral at bedtime    Endocrine:  atorvastatin 10 milliGRAM(s) Oral at bedtime  finasteride 5 milliGRAM(s) Oral daily    Cardiac:  doxazosin 4 milliGRAM(s) Oral at bedtime    Other Medications:  acetaminophen     Tablet .. 650 milliGRAM(s) Oral every 6 hours PRN  melatonin 3 milliGRAM(s) Oral at bedtime PRN      Allergies    No Known Allergies    Intolerances        Vital Signs Last 24 Hrs  T(C): 36.7 (21 Feb 2024 06:03), Max: 37.6 (20 Feb 2024 17:10)  T(F): 98.1 (21 Feb 2024 06:03), Max: 99.7 (20 Feb 2024 17:10)  HR: 88 (21 Feb 2024 06:03) (78 - 100)  BP: 110/66 (21 Feb 2024 06:03) (90/57 - 126/76)  BP(mean): --  RR: 18 (21 Feb 2024 06:03) (17 - 18)  SpO2: 96% (21 Feb 2024 06:03) (96% - 97%)    Parameters below as of 21 Feb 2024 06:03  Patient On (Oxygen Delivery Method): room air            PHYSICAL EXAM:  Constitutional: chronically ill appearing, emaciated   Neurological: awake and alert; SCHMIDT  Pt refused exam otherwise    LABS:      CBC Full  -  ( 21 Feb 2024 05:30 )  WBC Count : 22.76 K/uL  RBC Count : 4.14 M/uL  Hemoglobin : 10.5 g/dL  Hematocrit : 33.3 %  Platelet Count - Automated : 475 K/uL  Mean Cell Volume : 80.4 fl  Mean Cell Hemoglobin : 25.4 pg  Mean Cell Hemoglobin Concentration : 31.5 gm/dL  Auto Neutrophil # : 20.44 K/uL  Auto Lymphocyte # : 1.06 K/uL  Auto Monocyte # : 1.01 K/uL  Auto Eosinophil # : 0.03 K/uL  Auto Basophil # : 0.04 K/uL  Auto Neutrophil % : 89.8 %  Auto Lymphocyte % : 4.7 %  Auto Monocyte % : 4.4 %  Auto Eosinophil % : 0.1 %  Auto Basophil % : 0.2 %    02-21    136  |  101  |  20  ----------------------------<  106<H>  3.6   |  26  |  0.81    Ca    8.3<L>      21 Feb 2024 05:30  Phos  2.8     02-21  Mg     2.0     02-21    TPro  6.1  /  Alb  2.4<L>  /  TBili  0.5  /  DBili  0.2  /  AST  23  /  ALT  20  /  AlkPhos  133<H>  02-20          Urinalysis Basic - ( 21 Feb 2024 05:30 )    Color: x / Appearance: x / SG: x / pH: x  Gluc: 106 mg/dL / Ketone: x  / Bili: x / Urobili: x   Blood: x / Protein: x / Nitrite: x   Leuk Esterase: x / RBC: x / WBC x   Sq Epi: x / Non Sq Epi: x / Bacteria: x                RADIOLOGY & ADDITIONAL STUDIES:

## 2024-02-21 NOTE — CONSULT NOTE ADULT - PROBLEM SELECTOR RECOMMENDATION 6
Palliative care consulted for complex medical decision making in the setting of serious illness    On  discharge advise to follow up with supportive oncology with Eugene Arreola. Patient has to call at [phone] to make an appointment.

## 2024-02-21 NOTE — CONSULT NOTE ADULT - SUBJECTIVE AND OBJECTIVE BOX
HPI: 88M PMH squamous cell lung cancer, BPH, peripheral neuropathy, and anxiety presenting with progressive weakness, dyspnea on exertion, fatigue, and weight loss x2 weeks. Pt states he was diagnosed with lung cancer around 4 months ago (10/2023) after presenting to his PCP with hemoptysis. Follows with Dr Lawrence outpatient, who he most recently saw on 1/17/24, thoracentesis was done and pleurx was placed for large R sided pleural effusion. During that visit, pt also complained of fatigue and loss of appetite. However, reports that these symptoms have rapidly progressed over the past 2 weeks, during which he lost almost 20 pounds. Pt lives alone in an apartment in [place], without any HHA, however has home PT. He called his PCP Dr Hernandez on Monday 2/19/24 due to these sx, and was instructed to present to ER. ROS +urinary frequency, nocturia, constipation, night sweats, and productive cough with brown/green phlegm. Reports having night sweats for >1 year, noticed that he wets several bedsheets. Denies pain on urination and states polyuria is due to his prostate issues. He was reportedly supposed to start radiation therapy for his lung cancer, however has not received any treatment thus far. Follows with rad onc Dr. Hubbard at [facility].     Pt is a prior smoker, quit in 1976 after smoking for 20 years. He reports he has a sister named Marion in [place], but no friends or family in the area. Does not have official HCP, however he states it would probably be his sister. States that he would like all interventions done, or "whatever is convenient at that time."    ED Course  VS T 98.7 HR 60 /78 RR 17  Labs WBC 24.51 Hgb 9.8 Plt 441 Neut 93.9% Na 133 BUN 25 Cr 0.96   UA Large blood,   Imaging CXR w/ R sided pleural effusion  Interventions 1000cc NS x1     (20 Feb 2024 19:34)    PERTINENT PM/SXH:   Lung cancer    History of BPH      S/P rotator cuff repair    H/O carpal tunnel repair      FAMILY HISTORY:    ITEMS NOT CHECKED ARE NOT PRESENT    SOCIAL HISTORY:   Significant other/partner[ ]  Children[ ]  [employer]ybeterian/Spirituality:  Substance hx:  [ ]   Tobacco hx:  [x ]   Alcohol hx: [ ]   Home Opioid hx:  [ ] I-Stop Reference No:  Living Situation: [ x]Home  [ ]Long term care  [ ]Rehab [ ]Other    ADVANCE DIRECTIVES:    DNR  MOLST  [ ]  Living Will  [ ]   DECISION MAKER(s):  [ ] Health Care Proxy(s)  [ ] Surrogate(s)  [ ] Guardian           Name(s): Phone Number(s):    BASELINE (I)ADL(s) (prior to admission):  [place]: [ ]Total  [x ] Moderate [ ]Dependent    Allergies    No Known Allergies    Intolerances    MEDICATIONS  (STANDING):  atorvastatin 10 milliGRAM(s) Oral at bedtime  doxazosin 4 milliGRAM(s) Oral at bedtime  enoxaparin Injectable 30 milliGRAM(s) SubCutaneous every 24 hours  finasteride 5 milliGRAM(s) Oral daily  polyethylene glycol 3350 17 Gram(s) Oral daily  senna 2 Tablet(s) Oral at bedtime    MEDICATIONS  (PRN):  acetaminophen     Tablet .. 650 milliGRAM(s) Oral every 6 hours PRN Temp greater or equal to 38C (100.4F), Mild Pain (1 - 3)  melatonin 3 milliGRAM(s) Oral at bedtime PRN Sleep    PRESENT SYMPTOMS: [ ]Unable to obtain due to poor mentation   Source if other than patient:  [ ]Family   [ ]Team     Pain: [ ]yes [ x]no  QOL impact -   Location -                    Aggravating factors -  Quality -  Radiation -  Timing-  Severity (0-10 scale):  Minimal acceptable level (0-10 scale):     CPOT:    https://www.[facility].org/getattachment/tkq56z26-7n1z-2y3e-4u9n-8629l7727i3f/Critical-Care-Pain-Observation-Tool-(CPOT)      PAIN AD Score:     http://geriatrictoolkit.[place].[facility]/cog/painad.pdf (press ctrl +  left click to view)    Dyspnea:                           [ ]Mild [ ]Moderate [ ]Severe  Anxiety:                             [ ]Mild [ ]Moderate [ ]Severe  Fatigue:                             [ ]Mild [ ]Moderate [x ]Severe  Nausea:                             [ ]Mild [ ]Moderate [ ]Severe  Loss of appetite:              [x ]Mild [ ]Moderate [ ]Severe  Constipation:                    [ ]Mild [x]Moderate [ ]Severe    Other Symptoms:  [x ]All other review of systems negative     Palliative Performance Status Version 2:         %    http://npcrc.org/files/news/palliative_performance_scale_ppsv2.pdf  PHYSICAL EXAM:  Vital Signs Last 24 Hrs  T(C): 36.7 (21 Feb 2024 06:03), Max: 37.6 (20 Feb 2024 17:10)  T(F): 98.1 (21 Feb 2024 06:03), Max: 99.7 (20 Feb 2024 17:10)  HR: 88 (21 Feb 2024 06:03) (78 - 100)  BP: 110/66 (21 Feb 2024 06:03) (90/57 - 126/76)  BP(mean): --  RR: 18 (21 Feb 2024 06:03) (17 - 18)  SpO2: 96% (21 Feb 2024 06:03) (96% - 97%)    Parameters below as of 21 Feb 2024 06:03  Patient On (Oxygen Delivery Method): room air     I&O's Summary    GENERAL:  [ x]Alert  [x ]Oriented x 3  [x ]Lethargic  [ ]Cachexia  [ ]Unarousable  [x ]Verbal  [ ]Non-Verbal  Behavioral:   [ ] Anxiety  [ ] Delirium [ ] Agitation [ x] Other  HEENT:  [ ]Normal   [ x]Dry mouth   [ ]ET Tube/Trach  [ ]Oral lesions  PULMONARY:   [ ]Clear [ ]Tachypnea  [ ]Audible excessive secretions   [ ]Rhonchi        [ ]Right [ ]Left [ ]Bilateral  [ ]Crackles        [ ]Right [ ]Left [ ]Bilateral  [ ]Wheezing     [ ]Right [ ]Left [ ]Bilateral  [x ]Diminished breath sounds [ ]right [ ]left [ x]bilateral  CARDIOVASCULAR:    [x ]Regular [ ]Irregular [ ]Tachy  [ ]Brandin [ ]Murmur [ ]Other  GASTROINTESTINAL:  [x ]Soft  [ ]Distended   [ x]+BS  [x ]Non tender [ ]Tender  [ ]PEG [ ]OGT/ NGT  Last BM: 02/21/24  GENITOURINARY:  [ ]Normal [ ] Incontinent   [ ]Oliguria/Anuria   [ ]Gerber  MUSCULOSKELETAL:   [ ]Normal   [x ]Weakness  [ ]Bed/Wheelchair bound [ ]Edema  NEUROLOGIC:   [ ]No focal deficits  [ ]Cognitive impairment  [ ]Dysphagia [ ]Dysarthria [ ]Paresis [ ]Other   SKIN:   [ ]Normal    [ ]Rash  [ ]Pressure ulcer(s)       Present on admission [ ]y [ ]n    CRITICAL CARE:  [ ] Shock Present  [ ]Septic [ ]Cardiogenic [ ]Neurologic [ ]Hypovolemic  [ ]  Vasopressors [ ]  Inotropes   [ ]Respiratory failure present [ ]Mechanical ventilation [ ]Non-invasive ventilatory support [ ]High flow    [ ]Acute  [ ]Chronic [ ]Hypoxic  [ ]Hypercarbic [ ]Other  [ ]Other organ failure     LABS:                        10.5   22.76 )-----------( 475      ( 21 Feb 2024 05:30 )             33.3   02-21    136  |  101  |  20  ----------------------------<  106<H>  3.6   |  26  |  0.81    Ca    8.3<L>      21 Feb 2024 05:30  Phos  2.8     02-21  Mg     2.0     02-21    TPro  6.1  /  Alb  2.4<L>  /  TBili  0.5  /  DBili  0.2  /  AST  23  /  ALT  20  /  AlkPhos  133<H>  02-20      Urinalysis Basic - ( 21 Feb 2024 05:30 )    Color: x / Appearance: x / SG: x / pH: x  Gluc: 106 mg/dL / Ketone: x  / Bili: x / Urobili: x   Blood: x / Protein: x / Nitrite: x   Leuk Esterase: x / RBC: x / WBC x   Sq Epi: x / Non Sq Epi: x / Bacteria: x      RADIOLOGY & ADDITIONAL STUDIES:     Xray Abdomen 2 Views (02.21.24 @ 09:07) >  Rectal feces/impaction with no abnormal bowel dilatation or pneumoperitoneum. Lumbar and lower thoracic spine degenerative changes, lumbar rotatory dextroscoliosis. . Pelvic calcifications/urinary bladder calculi. Right chest tube in place. Right basilar opacity/pleural effusion.     Xray Chest 1 View- PORTABLE-Urgent (Xray Chest 1 View- PORTABLE-Urgent .) (02.20.24 @ 16:34) >  impression: Redemonstration right chest tube. Right opacity/pleural effusion... Stable heart size, thoracic aortic calcification and tortuosity. Stable bony structures.    Xray Chest 1 View-PORTABLE IMMEDIATE (Xray Chest 1 View-PORTABLE IMMEDIATE .) (01.17.24 @ 13:46) >  Heart size remains prominent. Mediastinal and hilar contours are otherwise within normal limits. There is a new loculated pneumothorax as well as pleural effusion extending into theright minor and major fissures. Interval placement of a right-sided chest tube the distal tip projecting over the inferior and medial aspect of the right lower lung zone.    PROTEIN CALORIE MALNUTRITION PRESENT: [ ]mild [ ]moderate [ ]severe [ ]underweight [ ]morbid obesity  https://www.andeal.org/vault/2440/web/files/ONC/Table_Clinical%20Characteristics%20to%20Document%20Malnutrition-White%20JV%20et%20al%202012.pdf    Height (cm): 170.2 (02-20-24 @ 14:59)  Weight (kg): 52.2 (02-20-24 @ 14:59)  BMI (kg/m2): 18 (02-20-24 @ 14:59)    [ ]PPSV2 < or = to 30% [ ]significant weight loss  [ ]poor nutritional intake  [ ]anasarca      [ ]Artificial Nutrition      REFERRALS:   [ ]Chaplaincy  [ ]Hospice  [ ]Child Life  [ ]Social Work  [ ]Case management [ ]Holistic Therapy     Goals of Care Document:

## 2024-02-21 NOTE — PROGRESS NOTE ADULT - SUBJECTIVE AND OBJECTIVE BOX
OVERNIGHT EVENTS: KAMINI    SUBJECTIVE:  The patient was seen and examined at the bedside this AM. The patient states that he is feeling weak and tired. He states that he will try to eat. No other concerns today.    VITAL SIGNS:  Vital Signs Last 24 Hrs  T(C): 36.6 (21 Feb 2024 13:04), Max: 37.6 (20 Feb 2024 17:10)  T(F): 97.8 (21 Feb 2024 13:04), Max: 99.7 (20 Feb 2024 17:10)  HR: 84 (21 Feb 2024 13:04) (78 - 100)  BP: 112/68 (21 Feb 2024 13:04) (90/57 - 126/76)  BP(mean): --  RR: 19 (21 Feb 2024 13:04) (17 - 19)  SpO2: 96% (21 Feb 2024 13:04) (96% - 97%)    Parameters below as of 21 Feb 2024 13:04  Patient On (Oxygen Delivery Method): room air        PHYSICAL EXAM:  General: cachectic appearing male, NAD; speaking in full sentences  HEENT: NC/AT; PERRL; EOMI; MMM  Neck: supple; no JVD  Cardiac: RRR; +S1/S2, aortic systolic murmur   Pulm: CTA B/L; no W/R/R  GI: soft, NT/ND, +BS  Extremities: WWP; no edema, clubbing or cyanosis  Vasc: 2+ radial, DP pulses B/L  Neuro: AAOx3; no focal deficits    MEDICATIONS:  MEDICATIONS  (STANDING):  atorvastatin 10 milliGRAM(s) Oral at bedtime  doxazosin 4 milliGRAM(s) Oral at bedtime  enoxaparin Injectable 30 milliGRAM(s) SubCutaneous every 24 hours  finasteride 5 milliGRAM(s) Oral daily  polyethylene glycol 3350 17 Gram(s) Oral daily  senna 2 Tablet(s) Oral at bedtime    MEDICATIONS  (PRN):  acetaminophen     Tablet .. 650 milliGRAM(s) Oral every 6 hours PRN Temp greater or equal to 38C (100.4F), Mild Pain (1 - 3)  melatonin 3 milliGRAM(s) Oral at bedtime PRN Sleep      ALLERGIES:  Allergies    No Known Allergies    Intolerances        LABS:                        10.5   22.76 )-----------( 475      ( 21 Feb 2024 05:30 )             33.3     02-21    136  |  101  |  20  ----------------------------<  106<H>  3.6   |  26  |  0.81    Ca    8.3<L>      21 Feb 2024 05:30  Phos  2.8     02-21  Mg     2.0     02-21    TPro  6.1  /  Alb  2.4<L>  /  TBili  0.5  /  DBili  0.2  /  AST  23  /  ALT  20  /  AlkPhos  133<H>  02-20        RADIOLOGY & ADDITIONAL TESTS: Reviewed.

## 2024-02-21 NOTE — CONSULT NOTE ADULT - ASSESSMENT
****INCOMPLETE   88M PMH squamous cell lung cancer, BPH, peripheral neuropathy, and anxiety presenting with progressive weakness, dyspnea on exertion, fatigue, and weight loss x2 weeks. Pulmonary consulted for known MPE and IPC.     #Malignant Pleural Effusion s/p IPC placement   #SCC of the Lung, Stage IIIA     Presenting with failure to thrive in the setting of known malignancy which has yet to be treated. Pulmonary consulted for known MPE s/p IPC placement by Dr. Lawrence. Currently on room air saturating well. Has leukocytosis, but otherwise no focal infectious symptoms. Will ultrasound space, and if no evidence of septations or complicated pleural space would continue drainage as per his normal schedule. If evidence of complicated space, will send fluid for analysis and consider lytics to open up the space.  88M PMH squamous cell lung cancer, BPH, peripheral neuropathy, and anxiety presenting with progressive weakness, dyspnea on exertion, fatigue, and weight loss x2 weeks. Pulmonary consulted for known MPE and IPC.     #Malignant Pleural Effusion s/p IPC placement   #SCC of the Lung, Stage IIIA     Presenting with failure to thrive in the setting of known malignancy which has yet to be treated. Pulmonary consulted for known MPE s/p IPC placement by Dr. Lawrence. Currently on room air saturating well. Has leukocytosis, but otherwise no focal infectious symptoms. Planned to ultrasound the space and send fluid studies and then drain the space dry to allow for possible IR biopsy. However, pt refusing physical exam, ultrasound, or drainage at pleurx during encounter.    Recommendations:  -would drain pleurx as regularly scheduled and send cell count and cytology when pt allows  -will attempt to US tomorrow    Case s/e/d with Dr. Stewart

## 2024-02-21 NOTE — CONSULT NOTE ADULT - PROBLEM SELECTOR RECOMMENDATION 5
Patient has a sister who lives in [place] Tyrese Pritchett, patient requested to don't call her sister for now, he want to get all information and then he will discuss with his sister

## 2024-02-21 NOTE — PROGRESS NOTE ADULT - ASSESSMENT
88M PMH lung cancer, BPH, neuropathy, and anxiety presenting with progressive weakness, dyspnea on exertion, and inability to care for self at home. Admitted to [facility] for further evaluation and management.

## 2024-02-21 NOTE — DIETITIAN INITIAL EVALUATION ADULT - MALNUTRITION
----- Message from True Boop sent at 4/4/2022 12:04 PM EDT -----  Subject: Refill Request    QUESTIONS  Name of Medication? losartan-hydroCHLOROthiazide (HYZAAR) 100-25 MG per   tablet  Patient-reported dosage and instructions? 100-25 Mg 1 PO Daily  How many days do you have left? 4  Preferred Pharmacy? [address] [facility] phone number (if available)? [phone]  Additional Information for Provider? Please refill pt wife said he hasnt   been taken them everyday will have new insurance next month and will make   a follow up appt  ---------------------------------------------------------------------------  --------------  [address]  What is the best way for the office to contact you? OK to leave message on   voicemail  Preferred Call Back Phone Number? [phone]  ---------------------------------------------------------------------------  --------------  SCRIPT ANSWERS  Relationship to Patient? Other  Representative Name? Baldemar  Is the Representative on the appropriate HIPAA document in Epic?  Yes Severe protein calorie malnutrition in context of acute illness

## 2024-02-21 NOTE — CONSULT NOTE ADULT - SUBJECTIVE AND OBJECTIVE BOX
Patient is a 88y old  Male who presents with a chief complaint of Failure to thrive (2024 19:34)      HPI:  88M PMH squamous cell lung cancer, BPH, peripheral neuropathy, and anxiety presenting with progressive weakness, dyspnea on exertion, fatigue, and weight loss x2 weeks. Pt states he was diagnosed with lung cancer around 4 months ago (10/2023) after presenting to his PCP with hemoptysis. Follows with Dr Larwence outpatient, who he most recently saw on 24, thoracentesis was done and pleurx was placed for large R sided pleural effusion. During that visit, pt also complained of fatigue and loss of appetite. However, reports that these symptoms have rapidly progressed over the past 2 weeks, during which he lost almost 20 pounds. Pt lives alone in an apartment in [place], without any HHA, however has home PT. He called his PCP Dr Hernandez on 24 due to these sx, and was instructed to present to ER. ROS +urinary frequency, nocturia, constipation, night sweats, and productive cough with brown/green phlegm. Reports having night sweats for >1 year, noticed that he wets several bedsheets. Denies pain on urination and states polyuria is due to his prostate issues. He was reportedly supposed to start radiation therapy for his lung cancer, however has not received any treatment thus far. Follows with rad onc Dr. Hubbard at [facility].     Pt is a prior smoker, quit in  after smoking for 20 years. He reports he has a sister named Marion in [place], but no friends or family in the area. Does not have official HCP, however he states it would probably be his sister. States that he would like all interventions done, or "whatever is convenient at that time."    ED Course  VS T 98.7 HR 60 /78 RR 17  Labs WBC 24.51 Hgb 9.8 Plt 441 Neut 93.9% Na 133 BUN 25 Cr 0.96   UA Large blood,   Imaging CXR w/ R sided pleural effusion  Interventions 1000cc NS x1     (2024 19:34)    PAST MEDICAL & SURGICAL HISTORY:  Lung cancer      History of BPH      S/P rotator cuff repair      H/O carpal tunnel repair        MEDICATIONS  (STANDING):  atorvastatin 10 milliGRAM(s) Oral at bedtime  doxazosin 4 milliGRAM(s) Oral at bedtime  enoxaparin Injectable 30 milliGRAM(s) SubCutaneous every 24 hours  finasteride 5 milliGRAM(s) Oral daily  polyethylene glycol 3350 17 Gram(s) Oral daily  senna 2 Tablet(s) Oral at bedtime    MEDICATIONS  (PRN):  acetaminophen     Tablet .. 650 milliGRAM(s) Oral every 6 hours PRN Temp greater or equal to 38C (100.4F), Mild Pain (1 - 3)  melatonin 3 milliGRAM(s) Oral at bedtime PRN Sleep            FAMILY HISTORY:      CBC Full  -  ( 2024 16:09 )  WBC Count : 24.51 K/uL  RBC Count : 3.83 M/uL  Hemoglobin : 9.8 g/dL  Hematocrit : 31.4 %  Platelet Count - Automated : 441 K/uL  Mean Cell Volume : 82.0 fl  Mean Cell Hemoglobin : 25.6 pg  Mean Cell Hemoglobin Concentration : 31.2 gm/dL  Auto Neutrophil # : 23.01 K/uL  Auto Lymphocyte # : 0.64 K/uL  Auto Monocyte # : 0.86 K/uL  Auto Eosinophil # : 0.00 K/uL  Auto Basophil # : 0.00 K/uL  Auto Neutrophil % : 93.9 %  Auto Lymphocyte % : 2.6 %  Auto Monocyte % : 3.5 %  Auto Eosinophil % : 0.0 %  Auto Basophil % : 0.0 %          133<L>  |  96  |  25<H>  ----------------------------<  122<H>  3.7   |  28  |  0.96    Ca    9.2      2024 16:09  Mg     2.2         TPro  6.1  /  Alb  2.4<L>  /  TBili  0.5  /  DBili  0.2  /  AST  23  /  ALT  20  /  AlkPhos  133<H>  20      Urinalysis Basic - ( 2024 17:34 )    Color: Yellow / Appearance: Clear / S.019 / pH: x  Gluc: x / Ketone: Negative mg/dL  / Bili: Negative / Urobili: 1.0 mg/dL   Blood: x / Protein: Trace mg/dL / Nitrite: Negative   Leuk Esterase: Trace / RBC: 454 /HPF / WBC 9 /HPF   Sq Epi: x / Non Sq Epi: 6 /HPF / Bacteria: Negative /HPF        Radiology :               Review of Systems : per HPI         Vital Signs Last 24 Hrs  T(C): 36.7 (2024 06:03), Max: 37.6 (2024 17:10)  T(F): 98.1 (2024 06:03), Max: 99.7 (2024 17:10)  HR: 88 (2024 06:03) (78 - 100)  BP: 110/66 (2024 06:03) (90/57 - 126/76)  BP(mean): --  RR: 18 (2024 06:03) (17 - 18)  SpO2: 96% (2024 06:03) (96% - 97%)    Parameters below as of 2024 06:03  Patient On (Oxygen Delivery Method): room air            Physical Exam :  cachectic 88 y o man lying comfortably in semi Salgado's position , awake , alert , no acute complaints     Head : normocephalic , atraumatic    Eyes : PERRLA , EOMI , no nystagmus , sclera anicteric    ENT / FACE : neg nasal discharge , uvula midline , no oropharyngeal erythema / exudate    Neck : supple , negative JVD , negative carotid bruits , no thyromegaly    Chest :  L PLX , crackles R base    Cardiovascular : regular rate and rhythm , neg murmurs / rubs / gallops    Abdomen : soft , non distended , non tender to palpation in all 4 quadrants ,  normal bowel sounds     Extremities : WWP , neg cyanosis /clubbing / edema     Neurologic Exam :     Alert and oriented  x 3        Motor Exam:            > 3+/5 x 4 extremities        Sensation :         intact to light touch x 4 extremities   DTR :           biceps/brachioradialis : equal                            patella/ankle : equal        Gait :  not tested          PM&R Impression : admitted for c/o progressive weakness, dyspnea on exertion, and inability to care for self at home     - deconditioned         Recommendations / Plan :       1) Physical / Occupational therapy focusing on therapeutic exercises , equipment evaluation , bed mobility/transfer out of bed evaluation , progressive ambulation with assistive devices prn .    2) Current disposition plan recommendation  :     CHENCHO placement

## 2024-02-21 NOTE — DIETITIAN INITIAL EVALUATION ADULT - PROBLEM SELECTOR PLAN 8
F: s/p 1L NS  E: K>4 Mg>2  N: Regular  GI: None  DVT: Lovenox 30 subq  Dispo: [facility]

## 2024-02-21 NOTE — CONSULT NOTE ADULT - PROBLEM SELECTOR RECOMMENDATION 3
In the setting of malignancy and to poor oral intake, lost 20 pounds in 2 weeks.  - Patient has been eating only eggs and yogurt at home  - Consider Nutrition consult

## 2024-02-21 NOTE — DIETITIAN INITIAL EVALUATION ADULT - OTHER CALCULATIONS
Based on Standards of Care pt below % IBW (78%) thus ideal body weight used for all calculations (148#). Needs adjusted for advanced age, cancer, malnutrition.

## 2024-02-21 NOTE — PROGRESS NOTE ADULT - PROBLEM SELECTOR PLAN 1
I/s/o multiple medical problems including malignancy, gait instability  Progressive weight loss since lung cancer diagnosis, however reports >20 pound weight loss over past 2 weeks. Lives alone and unable to cook for self as becomes fatigued and has unstable gait.   Plan:  - regular diet  - nutrition consulted  - PT/OT eval  - palliative consulted  - monitor electrolytes closely for refeeding syndrome  - consider starting appetite stimulants I/s/o multiple medical problems including known malignancy, chronic gait instability. Progressive weight loss since lung cancer diagnosis, however reports >20 pound weight loss over past 2 weeks. Lives alone and unable to cook for self as becomes fatigued and has unstable gait.   Plan:  - regular diet  - nutrition consulted  - PT/OT eval  - palliative consulted, appreciate recs  - Will monitor refeeding syndrome as patient starts earing again. Currently feeling hungry  I/s/o multiple medical problems including known malignancy, chronic gait instability. Progressive weight loss since lung cancer diagnosis, however reports >20 pound weight loss over past 2 weeks. Lives alone and unable to cook for self as becomes fatigued and has unstable gait.   Plan:  - regular diet  - nutrition consulted  - PT/OT eval  - palliative consulted, appreciate recs  - Will monitor refeeding syndrome as patient starts earing again. Currently feeling hungry

## 2024-02-21 NOTE — PROGRESS NOTE ADULT - PROBLEM SELECTOR PLAN 2
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 Follows with Dr Lawrence and Dr Fletcher (oncologist). s/p bronchoscopy, pleurx placement, and thoracentesis outpatient. EBUS FNA bx and BAL 11/2023 +malignant cells. RLL bx +SCC. Pending radiation treatment, last seen 12/2023 and was determined to be a good candidate for chemoradiation at that time however did not receive tx due to large pleural effusion. Now p/w FTT, progressive dyspnea, fatigue, weight loss, night sweats.  - heme/onc consult in AM  - palliative consulted  - encourage PO intake  - PT/OT eval Recently diagnosed 10/2023 w/ RLL squamous cell carcinoma. Follows with Dr Lawrence and Dr Fletcher (oncologist). s/p bronchoscopy, pleurx placement, and thoracentesis outpatient. EBUS FNA bx and BAL 11/2023 +malignant cells. RLL bx +SCC. Pending radiation treatment, last seen 12/2023 and was determined to be a good candidate for chemoradiation at that time however did not receive tx due to large pleural effusion. Now p/w FTT, progressive dyspnea, fatigue, weight loss, night sweats.  - heme/onc consult in AM  - palliative consulted  - encourage PO intake  - PT/OT eval  Recently diagnosed 10/2023 w/ RLL squamous cell carcinoma. Follows with Dr Lawrence and Dr Fletcher (oncologist). s/p bronchoscopy, pleurx placement, and thoracentesis outpatient. EBUS FNA bx and BAL 11/2023 +malignant cells. RLL bx +SCC. Pending radiation treatment, last seen 12/2023 and was determined to be a good candidate for chemoradiation at that time however did not receive tx due to large pleural effusion. Now p/w FTT, progressive dyspnea, fatigue, weight loss, night sweats.  - heme/onc consult in AM  - palliative consulted  - encourage PO intake  - PT/OT eval

## 2024-02-21 NOTE — PROGRESS NOTE ADULT - PROBLEM SELECTOR PLAN 9
F: s/p 1L NS  E: K>4 Mg>2  N: Regular  GI: None  DVT: Lovenox 30 subq  Dispo: [facility]

## 2024-02-21 NOTE — DIETITIAN INITIAL EVALUATION ADULT - PERTINENT MEDS FT
MEDICATIONS  (STANDING):  atorvastatin 10 milliGRAM(s) Oral at bedtime  doxazosin 4 milliGRAM(s) Oral at bedtime  finasteride 5 milliGRAM(s) Oral daily  polyethylene glycol 3350 17 Gram(s) Oral daily  senna 2 Tablet(s) Oral at bedtime    MEDICATIONS  (PRN):  acetaminophen     Tablet .. 650 milliGRAM(s) Oral every 6 hours PRN Temp greater or equal to 38C (100.4F), Mild Pain (1 - 3)  melatonin 3 milliGRAM(s) Oral at bedtime PRN Sleep

## 2024-02-21 NOTE — CONSULT NOTE ADULT - PROBLEM SELECTOR RECOMMENDATION 9
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  - s/p thoracocentesis and Pleurx placement as outpatient   - EBUS FNA bx and BAL 11/2023 +malignant cells. RLL bx +SCC  - Follows with Dr. Castro, determined that patient was candidate for chemoradiation Recently diagnosed 10/2023 w/ RLL squamous cell carcinoma.  - s/p thoracocentesis and Pleurx placement as outpatient   - EBUS FNA bx and BAL 11/2023 +malignant cells. RLL bx +SCC  - Follows with Dr. Lawrence, determined that patient was candidate for chemoradiation

## 2024-02-21 NOTE — DIETITIAN INITIAL EVALUATION ADULT - NUTRITIONGOAL OUTCOME1
Pt to meet at least 75% of nutritional needs consistently  Reduce/resolve signs and symptoms of protein calorie malnutrition

## 2024-02-21 NOTE — DISCHARGE NOTE NURSING/CASE MANAGEMENT/SOCIAL WORK - NSDCPEFALRISK_GEN_ALL_CORE
For information on Fall & Injury Prevention, visit: [email or web address] OR  [email or web address] OR  https://www.cdc.gov/steadi/patient.html

## 2024-02-22 ENCOUNTER — NON-APPOINTMENT (OUTPATIENT)
Age: 89
End: 2024-02-22

## 2024-02-22 DIAGNOSIS — R33.9 RETENTION OF URINE, UNSPECIFIED: ICD-10-CM

## 2024-02-22 DIAGNOSIS — R65.10 SYSTEMIC INFLAMMATORY RESPONSE SYNDROME (SIRS) OF NON-INFECTIOUS ORIGIN WITHOUT ACUTE ORGAN DYSFUNCTION: ICD-10-CM

## 2024-02-22 LAB
ANION GAP SERPL CALC-SCNC: 11 MMOL/L — SIGNIFICANT CHANGE UP (ref 5–17)
ANION GAP SERPL CALC-SCNC: 9 MMOL/L — SIGNIFICANT CHANGE UP (ref 5–17)
BUN SERPL-MCNC: 28 MG/DL — HIGH (ref 7–23)
BUN SERPL-MCNC: 31 MG/DL — HIGH (ref 7–23)
CALCIUM SERPL-MCNC: 9 MG/DL — SIGNIFICANT CHANGE UP (ref 8.4–10.5)
CALCIUM SERPL-MCNC: 9.2 MG/DL — SIGNIFICANT CHANGE UP (ref 8.4–10.5)
CHLORIDE SERPL-SCNC: 103 MMOL/L — SIGNIFICANT CHANGE UP (ref 96–108)
CHLORIDE SERPL-SCNC: 105 MMOL/L — SIGNIFICANT CHANGE UP (ref 96–108)
CO2 SERPL-SCNC: 24 MMOL/L — SIGNIFICANT CHANGE UP (ref 22–31)
CO2 SERPL-SCNC: 27 MMOL/L — SIGNIFICANT CHANGE UP (ref 22–31)
CREAT ?TM UR-MCNC: 90 MG/DL — SIGNIFICANT CHANGE UP
CREAT SERPL-MCNC: 1.34 MG/DL — HIGH (ref 0.5–1.3)
CREAT SERPL-MCNC: 1.52 MG/DL — HIGH (ref 0.5–1.3)
EGFR: 44 ML/MIN/1.73M2 — LOW
EGFR: 51 ML/MIN/1.73M2 — LOW
GLUCOSE SERPL-MCNC: 107 MG/DL — HIGH (ref 70–99)
GLUCOSE SERPL-MCNC: 139 MG/DL — HIGH (ref 70–99)
HCT VFR BLD CALC: 31.4 % — LOW (ref 39–50)
HGB BLD-MCNC: 9.8 G/DL — LOW (ref 13–17)
MCHC RBC-ENTMCNC: 25.3 PG — LOW (ref 27–34)
MCHC RBC-ENTMCNC: 31.2 GM/DL — LOW (ref 32–36)
MCV RBC AUTO: 81.1 FL — SIGNIFICANT CHANGE UP (ref 80–100)
NRBC # BLD: 0 /100 WBCS — SIGNIFICANT CHANGE UP (ref 0–0)
OSMOLALITY UR: 476 MOSM/KG — SIGNIFICANT CHANGE UP (ref 300–900)
PLATELET # BLD AUTO: 447 K/UL — HIGH (ref 150–400)
POTASSIUM SERPL-MCNC: 3.8 MMOL/L — SIGNIFICANT CHANGE UP (ref 3.5–5.3)
POTASSIUM SERPL-MCNC: 3.9 MMOL/L — SIGNIFICANT CHANGE UP (ref 3.5–5.3)
POTASSIUM SERPL-SCNC: 3.8 MMOL/L — SIGNIFICANT CHANGE UP (ref 3.5–5.3)
POTASSIUM SERPL-SCNC: 3.9 MMOL/L — SIGNIFICANT CHANGE UP (ref 3.5–5.3)
PROT ?TM UR-MCNC: 17 MG/DL — HIGH (ref 0–12)
PROT/CREAT UR-RTO: 0.2 RATIO — SIGNIFICANT CHANGE UP (ref 0–0.2)
RBC # BLD: 3.87 M/UL — LOW (ref 4.2–5.8)
RBC # FLD: 15.2 % — HIGH (ref 10.3–14.5)
SODIUM SERPL-SCNC: 139 MMOL/L — SIGNIFICANT CHANGE UP (ref 135–145)
SODIUM SERPL-SCNC: 140 MMOL/L — SIGNIFICANT CHANGE UP (ref 135–145)
SODIUM UR-SCNC: 47 MMOL/L — SIGNIFICANT CHANGE UP
VIT D25+D1,25 OH+D1,25 PNL SERPL-MCNC: 67.2 PG/ML — SIGNIFICANT CHANGE UP (ref 19.9–79.3)
WBC # BLD: 26.3 K/UL — HIGH (ref 3.8–10.5)
WBC # FLD AUTO: 26.3 K/UL — HIGH (ref 3.8–10.5)

## 2024-02-22 PROCEDURE — 99497 ADVNCD CARE PLAN 30 MIN: CPT | Mod: 25

## 2024-02-22 PROCEDURE — 99233 SBSQ HOSP IP/OBS HIGH 50: CPT | Mod: GC

## 2024-02-22 PROCEDURE — 76604 US EXAM CHEST: CPT | Mod: 26

## 2024-02-22 PROCEDURE — 51703 INSERT BLADDER CATH COMPLEX: CPT

## 2024-02-22 PROCEDURE — 99233 SBSQ HOSP IP/OBS HIGH 50: CPT

## 2024-02-22 RX ORDER — HEPARIN SODIUM 5000 [USP'U]/ML
5000 INJECTION INTRAVENOUS; SUBCUTANEOUS EVERY 8 HOURS
Refills: 0 | Status: DISCONTINUED | OUTPATIENT
Start: 2024-02-22 | End: 2024-02-29

## 2024-02-22 RX ORDER — DORNASE ALFA 1 MG/ML
5 SOLUTION RESPIRATORY (INHALATION) EVERY 12 HOURS
Refills: 0 | Status: COMPLETED | OUTPATIENT
Start: 2024-02-22 | End: 2024-02-25

## 2024-02-22 RX ORDER — SODIUM CHLORIDE 9 MG/ML
500 INJECTION, SOLUTION INTRAVENOUS ONCE
Refills: 0 | Status: COMPLETED | OUTPATIENT
Start: 2024-02-22 | End: 2024-02-22

## 2024-02-22 RX ORDER — ALTEPLASE 100 MG
10 KIT INTRAVENOUS EVERY 12 HOURS
Refills: 0 | Status: COMPLETED | OUTPATIENT
Start: 2024-02-22 | End: 2024-02-25

## 2024-02-22 RX ORDER — SODIUM CHLORIDE 9 MG/ML
1000 INJECTION, SOLUTION INTRAVENOUS
Refills: 0 | Status: DISCONTINUED | OUTPATIENT
Start: 2024-02-22 | End: 2024-02-23

## 2024-02-22 RX ADMIN — Medication 3 MILLIGRAM(S): at 22:05

## 2024-02-22 RX ADMIN — SODIUM CHLORIDE 80 MILLILITER(S): 9 INJECTION, SOLUTION INTRAVENOUS at 13:22

## 2024-02-22 RX ADMIN — SODIUM CHLORIDE 1000 MILLILITER(S): 9 INJECTION, SOLUTION INTRAVENOUS at 12:36

## 2024-02-22 RX ADMIN — FINASTERIDE 5 MILLIGRAM(S): 5 TABLET, FILM COATED ORAL at 12:04

## 2024-02-22 RX ADMIN — DORNASE ALFA 5 MILLIGRAM(S): 1 SOLUTION RESPIRATORY (INHALATION) at 18:57

## 2024-02-22 RX ADMIN — ATORVASTATIN CALCIUM 10 MILLIGRAM(S): 80 TABLET, FILM COATED ORAL at 21:53

## 2024-02-22 RX ADMIN — SODIUM CHLORIDE 100 MILLILITER(S): 9 INJECTION, SOLUTION INTRAVENOUS at 17:29

## 2024-02-22 RX ADMIN — Medication 4 MILLIGRAM(S): at 21:53

## 2024-02-22 RX ADMIN — SENNA PLUS 2 TABLET(S): 8.6 TABLET ORAL at 21:53

## 2024-02-22 RX ADMIN — ALTEPLASE 10 MILLIGRAM(S): KIT at 18:57

## 2024-02-22 RX ADMIN — POLYETHYLENE GLYCOL 3350 17 GRAM(S): 17 POWDER, FOR SOLUTION ORAL at 12:04

## 2024-02-22 RX ADMIN — HEPARIN SODIUM 5000 UNIT(S): 5000 INJECTION INTRAVENOUS; SUBCUTANEOUS at 12:05

## 2024-02-22 RX ADMIN — HEPARIN SODIUM 5000 UNIT(S): 5000 INJECTION INTRAVENOUS; SUBCUTANEOUS at 22:07

## 2024-02-22 NOTE — PROGRESS NOTE ADULT - ASSESSMENT
88M PMH squamous cell lung cancer, BPH, peripheral neuropathy, and anxiety presenting with progressive weakness, dyspnea on exertion, fatigue, and weight loss x2 weeks. Pulmonary consulted for known MPE and IPC.     #Malignant Pleural Effusion s/p IPC placement   #SCC of the Lung, Stage IIIA     Presenting with failure to thrive in the setting of known malignancy which has yet to be treated. Pulmonary consulted for known MPE s/p IPC placement by Dr. Lawrence. Currently on room air saturating well. Has leukocytosis, but otherwise no focal infectious symptoms. Planned to ultrasound the space and send fluid studies and then drain the space dry to allow for possible IR biopsy. However, pt refusing physical exam, ultrasound, or drainage at pleurx during encounter.    Recommendations:  -would drain pleurx as regularly scheduled and send cell count and cytology when pt allows  -will attempt to US tomorrow  -would have palliative see patient again, if he is not interested in pursuing further workup and treatment, we can remove the IPC    Case s/e/d with Dr. Stewart 88M PMH squamous cell lung cancer, BPH, peripheral neuropathy, and anxiety presenting with progressive weakness, dyspnea on exertion, fatigue, and weight loss x2 weeks. Pulmonary consulted for known MPE and IPC.     #Malignant Pleural Effusion s/p IPC placement   #SCC of the Lung, Stage IIIA     Presenting with failure to thrive in the setting of known malignancy which has yet to be treated. Pulmonary consulted for known MPE s/p IPC placement by Dr. Lawrence. Currently on room air saturating well. Has leukocytosis, but otherwise no focal infectious symptoms. On bedside US, right sided small pleural effusion with visible septations. Unable to drain from pleurx. Will plan to instill tPA in pleurx and then attempt to drain to send fluid studies.    Recommendations:  -will place tPA in pleurx catheter, clamp for one hour, and then unclamp and attempt to drain fluid  -will send fluid studies if pleurx is able to drain  -would have palliative see patient again, if he is not interested in pursuing further workup and treatment, we can remove the IPC    Case s/e/d with Dr. Stewart 88M PMH squamous cell lung cancer, BPH, peripheral neuropathy, and anxiety presenting with progressive weakness, dyspnea on exertion, fatigue, and weight loss x2 weeks. Pulmonary consulted for known MPE and IPC.     #Malignant Pleural Effusion s/p IPC placement   #SCC of the Lung, Stage IIIA     Presenting with failure to thrive in the setting of known malignancy which has yet to be treated. Pulmonary consulted for known MPE s/p IPC placement by Dr. Lawrence. Currently on room air saturating well. Has leukocytosis, but otherwise no focal infectious symptoms. On bedside US, right sided small pleural effusion with visible septations. Unable to drain from pleurx. Will plan to instill tPA in pleurx and then attempt to drain to send fluid studies. Per Dr. Lawrence who placed pleurx, effusion was septated when pleurx was initially placed one month ago.    Recommendations:  -will place tPA in pleurx catheter, clamp for one hour, and then unclamp and attempt to drain fluid  -will send fluid studies if pleurx is able to drain  -would have palliative see patient again, if he is not interested in pursuing further workup and treatment, we can remove the IPC    Case s/e/d with Dr. Stewart

## 2024-02-22 NOTE — PROGRESS NOTE ADULT - PROBLEM SELECTOR PLAN 9
F: s/p 1L NS  E: K>4 Mg>2  N: Regular  GI: None  DVT: Lovenox 30 subq  Dispo: [facility] Last seen urologist prior to COVID, per HIE was seen in 2017. Takes terazosin 5mg and finasteride 5mg at home. Reports chronic nocturia and polyuria that has not particularly worsened.  - c/w home meds Hgb on presentation 9.8, last hgb 11.7 1/2024. Pt denies hematuria, hematemesis, hemoptysis, or melena. +constipation. UA +RBC and large blood. Likely AOCD and nutritional deficiencies i/s/o progressing lung cancer and significant weight loss.  Iron studies noted  Plan:  - maintain active T&S  - Transfuse if Hgb <7

## 2024-02-22 NOTE — PROGRESS NOTE ADULT - PROBLEM SELECTOR PLAN 2
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 Follows with Dr Lawrence and Dr Fletcher (oncologist). s/p bronchoscopy, pleurx placement, and thoracentesis outpatient. EBUS FNA bx and BAL 11/2023 +malignant cells. RLL bx +SCC. Pending radiation treatment, last seen 12/2023 and was determined to be a good candidate for chemoradiation at that time however did not receive tx due to large pleural effusion. Now p/w FTT, progressive dyspnea, fatigue, weight loss, night sweats.  Plan:  - palliative consulted  - encourage PO intake  - PT/OT eval. Patient meeting SIRS criteria (HR >90 and Leukocytosis. His leukocytosis is worsening from admission. No fevers recorded. No clear infectious source at this time. Patient meeting SIRS criteria (HR >90 and Leukocytosis. His leukocytosis is worsening from admission. No fevers recorded. No clear infectious source at this time and is low on differential at this time.   Plan:  - Will continue to watch WBC and keep him off antibiotics at this time given no clear source of infection

## 2024-02-22 NOTE — PROGRESS NOTE ADULT - PROBLEM SELECTOR PLAN 3
In the setting of malignancy and to poor oral intake, lost 20 pounds in 2 weeks.  - On Diet, Regular, Ensure Enlive 2x/day (350kcal, 20g protein per serving)

## 2024-02-22 NOTE — PROGRESS NOTE ADULT - ASSESSMENT
88M PMH lung cancer, BPH, neuropathy, and anxiety presenting with progressive weakness, dyspnea on exertion, and inability to care for self at home. Admitted to [facility] for further evaluation and management. 88M PMH lung cancer with pleural effusions s/p Pleurx placement prior to this hospitalization, BPH, neuropathy, and anxiety presenting with progressive weakness, dyspnea on exertion, and inability to care for self at home. Admitted to [facility] for further evaluation and management of Failure to Thrive. Course c/b urinary retention needed ruth placement on 2/22.

## 2024-02-22 NOTE — PROGRESS NOTE ADULT - PROBLEM SELECTOR PLAN 6
Palliative care following for complex medical decision making in the setting of serious illness    On  discharge advise to follow up with supportive oncology with Eugene Arreola. Patient has to call at [phone] to make an appointment.

## 2024-02-22 NOTE — PROGRESS NOTE ADULT - PROBLEM SELECTOR PLAN 6
BMI 18, >20 pound weight loss over past 2 weeks.  - nutrition consulted The patient was complaining of constipation overnight. Started on Miralax, however, per nursing, the patient was given senna and had a large bowel movement, therefore, Miralax was held.   He is complaining of abdominal discomfort at this time, improved from admission  Abdominal xray with stool burden.  Plan:  - c/w senna at night, patient refused overnight and simethicone was given  - Miralax PRN Pt presenting w/ WBC 24 (last WBC ~15 1/2024), elevated neutrophils, as well as productive cough w/ yellow/brown phlegm. Pt reports night sweats, fatigue, and weight loss however denies fevers/chills at home. Mostly homebound and no recent sick contacts. s/p 1000cc NS bolus in ED. Lactate WNL. Platelets elevated, and i/s/o poor PO intake, suspect some hemoconcentration.  ESR elevated  Plan:  - watch off abx at this time  - f/u BCx

## 2024-02-22 NOTE — PROGRESS NOTE ADULT - PROBLEM SELECTOR PLAN 5
The patient was complaining of constipation overnight. Started on Miralax, however, per nursing, the patient was given senna and had a large bowel movement, therefore, Miralax was held.   He is complaining of abdominal discomfort at this time, improved from admission  Abdominal xray with stool burden.  Plan:  - c/w senna at night, patient refused overnight and simethicone was given  - Miralax PRN Pt presenting w/ WBC 24 (last WBC ~15 1/2024), elevated neutrophils, as well as productive cough w/ yellow/brown phlegm. Pt reports night sweats, fatigue, and weight loss however denies fevers/chills at home. Mostly homebound and no recent sick contacts. s/p 1000cc NS bolus in ED. Lactate WNL. Platelets elevated, and i/s/o poor PO intake, suspect some hemoconcentration.  ESR elevated  Plan:  - watch off abx at this time  - f/u BCx  - f/u RVP CXR this admission with redemonstrated pleural effusion. Follows with Dr Lawrence outpatient. Currently comfortable on RA, however pt reports dyspnea on exertion and discomfort at pleurx site.   Plan:  - Pulm on board, plan for TPA/Dornase today   - Drain Plurx as scheduled   - Pending Palliative conversation for further care

## 2024-02-22 NOTE — PROGRESS NOTE ADULT - PROBLEM SELECTOR PLAN 4
Pt presenting w/ WBC 24 (last WBC ~15 1/2024), elevated neutrophils, as well as productive cough w/ yellow/brown phlegm. Pt reports night sweats, fatigue, and weight loss however denies fevers/chills at home. Mostly homebound and no recent sick contacts. s/p 1000cc NS bolus in ED. Lactate WNL. Platelets elevated, and i/s/o poor PO intake, suspect some hemoconcentration.  ESR elevated  Plan:  - watch off abx at this time  - f/u BCx  - f/u RVP CXR this admission with redemonstrated pleural effusion. Follows with Dr Lawrence outpatient. Currently comfortable on RA, however pt reports dyspnea on exertion and discomfort at pleurx site.   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 Follows with Dr Lawrence and Dr Fletcher (oncologist). s/p bronchoscopy, pleurx placement, and thoracentesis outpatient. EBUS FNA bx and BAL 11/2023 +malignant cells. RLL bx +SCC. Pending radiation treatment, last seen 12/2023 and was determined to be a good candidate for chemoradiation at that time however did not receive tx due to large pleural effusion. Now p/w FTT, progressive dyspnea, fatigue, weight loss, night sweats.  Plan:  - palliative consulted, appreciate recs  - encourage PO intake  - PT/OT eval. 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 Follows with Dr Lawrence and Dr Fletcher (oncologist). s/p bronchoscopy, pleurx placement, and thoracentesis outpatient. EBUS FNA bx and BAL 11/2023 +malignant cells. RLL bx +SCC. Pending radiation treatment, last seen 12/2023 and was determined to be a good candidate for chemoradiation at that time however did not receive tx due to large pleural effusion. Now p/w FTT, progressive dyspnea, fatigue, weight loss, night sweats.  Plan:  - palliative consulted to aid in decision about possible chemo/radiation therapy  - Pulm on board as well   - encourage PO intake  - PT/OT eval.

## 2024-02-22 NOTE — PROGRESS NOTE ADULT - SUBJECTIVE AND OBJECTIVE BOX
PULMONARY CONSULT SERVICE FOLLOW-UP NOTE    INTERVAL HPI:  Reviewed chart and overnight events; patient seen and examined at bedside. Asked pt about doing bedside lung US and draining his pleurx today and he stated that right now he is waiting for urology for his prostate/bladder issue and can only focus on that. I explained that we wanted to drain his pleural fluid to send studies and possibly prepare for biopsy, but pt expressed that he did not want to talk about it.    MEDICATIONS:  Pulmonary:    Antimicrobials:    Anticoagulants:  heparin   Injectable 5000 Unit(s) SubCutaneous every 8 hours    Cardiac:  doxazosin 4 milliGRAM(s) Oral at bedtime      Allergies    No Known Allergies    Intolerances        Vital Signs Last 24 Hrs  T(C): 36.6 (22 Feb 2024 06:19), Max: 37.3 (21 Feb 2024 20:44)  T(F): 97.8 (22 Feb 2024 06:19), Max: 99.1 (21 Feb 2024 20:44)  HR: 94 (22 Feb 2024 06:19) (84 - 94)  BP: 123/64 (22 Feb 2024 06:19) (112/68 - 123/64)  BP(mean): --  RR: 17 (22 Feb 2024 06:19) (17 - 19)  SpO2: 95% (22 Feb 2024 06:19) (95% - 96%)    Parameters below as of 22 Feb 2024 06:19  Patient On (Oxygen Delivery Method): room air            PHYSICAL EXAM:  Constitutional: well-appearing  Neurological: awake and alert; SCHMIDT  Refused further exam    LABS:      CBC Full  -  ( 22 Feb 2024 08:27 )  WBC Count : 26.30 K/uL  RBC Count : 3.87 M/uL  Hemoglobin : 9.8 g/dL  Hematocrit : 31.4 %  Platelet Count - Automated : 447 K/uL  Mean Cell Volume : 81.1 fl  Mean Cell Hemoglobin : 25.3 pg  Mean Cell Hemoglobin Concentration : 31.2 gm/dL  Auto Neutrophil # : x  Auto Lymphocyte # : x  Auto Monocyte # : x  Auto Eosinophil # : x  Auto Basophil # : x  Auto Neutrophil % : x  Auto Lymphocyte % : x  Auto Monocyte % : x  Auto Eosinophil % : x  Auto Basophil % : x    02-22    140  |  105  |  31<H>  ----------------------------<  139<H>  3.9   |  24  |  1.52<H>    Ca    9.0      22 Feb 2024 08:27  Phos  2.8     02-21  Mg     2.0     02-21    TPro  6.1  /  Alb  2.4<L>  /  TBili  0.5  /  DBili  0.2  /  AST  23  /  ALT  20  /  AlkPhos  133<H>  02-20          Urinalysis Basic - ( 22 Feb 2024 08:27 )    Color: x / Appearance: x / SG: x / pH: x  Gluc: 139 mg/dL / Ketone: x  / Bili: x / Urobili: x   Blood: x / Protein: x / Nitrite: x   Leuk Esterase: x / RBC: x / WBC x   Sq Epi: x / Non Sq Epi: x / Bacteria: x                RADIOLOGY & ADDITIONAL STUDIES: PULMONARY CONSULT SERVICE FOLLOW-UP NOTE    INTERVAL HPI:  Reviewed chart and overnight events; patient seen and examined at bedside. Pt initially did not want to talk about pleurx drainage, but when pt family arrived he was agreeable to being US and drained. Unable to drain from pleurx. Plan to instill tPA    MEDICATIONS:  Pulmonary:    Antimicrobials:    Anticoagulants:  heparin   Injectable 5000 Unit(s) SubCutaneous every 8 hours    Cardiac:  doxazosin 4 milliGRAM(s) Oral at bedtime      Allergies    No Known Allergies    Intolerances        Vital Signs Last 24 Hrs  T(C): 36.6 (22 Feb 2024 06:19), Max: 37.3 (21 Feb 2024 20:44)  T(F): 97.8 (22 Feb 2024 06:19), Max: 99.1 (21 Feb 2024 20:44)  HR: 94 (22 Feb 2024 06:19) (84 - 94)  BP: 123/64 (22 Feb 2024 06:19) (112/68 - 123/64)  BP(mean): --  RR: 17 (22 Feb 2024 06:19) (17 - 19)  SpO2: 95% (22 Feb 2024 06:19) (95% - 96%)    Parameters below as of 22 Feb 2024 06:19  Patient On (Oxygen Delivery Method): room air            PHYSICAL EXAM:  Constitutional: well-appearing  Neurological: awake and alert; SCHMIDT  Refused further exam    LABS:      CBC Full  -  ( 22 Feb 2024 08:27 )  WBC Count : 26.30 K/uL  RBC Count : 3.87 M/uL  Hemoglobin : 9.8 g/dL  Hematocrit : 31.4 %  Platelet Count - Automated : 447 K/uL  Mean Cell Volume : 81.1 fl  Mean Cell Hemoglobin : 25.3 pg  Mean Cell Hemoglobin Concentration : 31.2 gm/dL  Auto Neutrophil # : x  Auto Lymphocyte # : x  Auto Monocyte # : x  Auto Eosinophil # : x  Auto Basophil # : x  Auto Neutrophil % : x  Auto Lymphocyte % : x  Auto Monocyte % : x  Auto Eosinophil % : x  Auto Basophil % : x    02-22    140  |  105  |  31<H>  ----------------------------<  139<H>  3.9   |  24  |  1.52<H>    Ca    9.0      22 Feb 2024 08:27  Phos  2.8     02-21  Mg     2.0     02-21    TPro  6.1  /  Alb  2.4<L>  /  TBili  0.5  /  DBili  0.2  /  AST  23  /  ALT  20  /  AlkPhos  133<H>  02-20          Urinalysis Basic - ( 22 Feb 2024 08:27 )    Color: x / Appearance: x / SG: x / pH: x  Gluc: 139 mg/dL / Ketone: x  / Bili: x / Urobili: x   Blood: x / Protein: x / Nitrite: x   Leuk Esterase: x / RBC: x / WBC x   Sq Epi: x / Non Sq Epi: x / Bacteria: x                RADIOLOGY & ADDITIONAL STUDIES:

## 2024-02-22 NOTE — PROGRESS NOTE ADULT - PROBLEM SELECTOR PLAN 3
CXR this admission with redemonstrated pleural effusion. Follows with Dr Lawrence outpatient. Currently comfortable on RA, however pt reports dyspnea on exertion and discomfort at pleurx site.   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 Follows with Dr Lawrence and Dr Fletcher (oncologist). s/p bronchoscopy, pleurx placement, and thoracentesis outpatient. EBUS FNA bx and BAL 11/2023 +malignant cells. RLL bx +SCC. Pending radiation treatment, last seen 12/2023 and was determined to be a good candidate for chemoradiation at that time however did not receive tx due to large pleural effusion. Now p/w FTT, progressive dyspnea, fatigue, weight loss, night sweats.  Plan:  - palliative consulted  - encourage PO intake  - PT/OT eval. Patient with Hx of BPH on medications outpatient. BS today showed 1.7L of urine in bladder. Trial of straight cath was unsuccessful therefore urology was called for ruth placement. 1.5L was removed from bladder   Plan:  - watch for post-obstructive diuresis   - Continue ruth for at least 3 days per urology recs and TOV afterwards. Patient with Hx of BPH on medications outpatient. BS today showed 1.7L of urine in bladder. Trial of straight cath was unsuccessful therefore urology was called for Gerber placement. 1.5L was removed from bladder.   Plan:  - watch for post-obstructive diuresis   - Continue Gerber for at least 3 days per urology recs and TOV afterwards.

## 2024-02-22 NOTE — PROGRESS NOTE ADULT - PROBLEM SELECTOR PLAN 10
F: s/p 1L NS  E: K>4 Mg>2  N: Regular  GI: None  DVT: Lovenox 30 subq  Dispo: [facility] Last seen urologist prior to COVID, per HIE was seen in 2017. Takes terazosin 5mg and finasteride 5mg at home. Reports chronic nocturia and polyuria that has not particularly worsened.  - c/w home meds Last seen urologist prior to COVID, per HIE was seen in 2017. Takes terazosin 5mg and finasteride 5mg at home. Reports chronic nocturia and polyuria that has not particularly worsened.  - c/w home meds  - TOV in 3 days given acute urinary retention

## 2024-02-22 NOTE — PROGRESS NOTE ADULT - SUBJECTIVE AND OBJECTIVE BOX
SUBJECTIVE AND OBJECTIVE:  Indication for Geriatrics and Palliative Care Services/INTERVAL HPI: 88M PMH lung cancer, BPH, neuropathy, and anxiety presenting with progressive weakness, dyspnea on exertion, and inability to care for self at home. Admitted to [facility] for further evaluation and management. Palliative care consulted for complex medical decision making in the setting of serious illness      OVERNIGHT EVENTS: Patient seen and examined at bedside. Patient this morning states that is tolerating well diet, denies acute symptoms.   Noticed that patient has been declining to work with PT     DNR on chart:  Allergies    No Known Allergies    Intolerances    MEDICATIONS  (STANDING):  atorvastatin 10 milliGRAM(s) Oral at bedtime  doxazosin 4 milliGRAM(s) Oral at bedtime  finasteride 5 milliGRAM(s) Oral daily  heparin   Injectable 5000 Unit(s) SubCutaneous every 8 hours  lactated ringers. 1000 milliLiter(s) (80 mL/Hr) IV Continuous <Continuous>  polyethylene glycol 3350 17 Gram(s) Oral daily  senna 2 Tablet(s) Oral at bedtime    MEDICATIONS  (PRN):  acetaminophen     Tablet .. 650 milliGRAM(s) Oral every 6 hours PRN Temp greater or equal to 38C (100.4F), Mild Pain (1 - 3)  melatonin 3 milliGRAM(s) Oral at bedtime PRN Sleep      ITEMS UNCHECKED ARE NOT PRESENT    PRESENT SYMPTOMS: [ ]Unable to self-report - see  CPOT, PAINADS, RDOS below  Source if other than patient:  [ ]Family   [ ]Team     Pain:  [ ]yes [x]no  QOL impact -   Location -                    Aggravating factors -  Quality -  Radiation -  Timing-  Severity (0-10 scale):  Minimal acceptable level (0-10 scale):     Dyspnea:                           [ ]Mild [ ]Moderate [ ]Severe  Anxiety:                             [ ]Mild [ ]Moderate [ ]Severe  Fatigue:                             [ ]Mild [ ]Moderate [ ]Severe  Nausea:                             [ ]Mild [ ]Moderate [ ]Severe  Loss of appetite:              [ ]Mild [ ]Moderate [ ]Severe  Constipation:                    [ ]Mild [ ]Moderate [ ]Severe    PCSSQ[Palliative Care Spiritual Screening Question]   Severity (0-10):  Score of 4 or > indicate consideration of Chaplaincy referral.  Chaplaincy Referral: [ ] yes [ ] refused [ ] following    Caregiver [place]? : [ ] yes [ ] no  [ ] deferred:  Social work referral [ ] Patient & Family Centered Care Referral [ ]     Anticipatory Grief present?:  [ ] yes [ ] no  [ ] deferred: Social work referral [ ] Patient & Family Centered Care Referral [ ]      Other Symptoms:  [x]All other review of systems negative     PHYSICAL EXAM:  Vital Signs Last 24 Hrs  T(C): 36.4 (22 Feb 2024 12:33), Max: 37.3 (21 Feb 2024 20:44)  T(F): 97.5 (22 Feb 2024 12:33), Max: 99.1 (21 Feb 2024 20:44)  HR: 82 (22 Feb 2024 13:24) (65 - 94)  BP: 111/65 (22 Feb 2024 13:24) (97/50 - 123/64)  BP(mean): --  RR: 16 (22 Feb 2024 12:33) (16 - 18)  SpO2: 94% (22 Feb 2024 12:33) (94% - 96%)    Parameters below as of 22 Feb 2024 12:33  Patient On (Oxygen Delivery Method): room air     I&O's Summary    22 Feb 2024 07:01  -  22 Feb 2024 14:42  --------------------------------------------------------  IN: 240 mL / OUT: 1300 mL / NET: -1060 mL       GENERAL: [ ]Cachexia    [x]Alert  [x]Oriented x  person and place {}Lethargic  [ ]Unarousable  [ xVerbal  [ ]Non-Verbal  Behavioral:   [ ]Anxiety  [ ]Delirium [ ]Agitation  xOther: confused   HEENT:  [ xNormal   [ ]Dry mouth   [ ]ET Tube/Trach  [ ]Oral lesions  PULMONARY:   [ ]Clear [ ]Tachypnea  [ ]Audible excessive secretions   [ ]Rhonchi        [ ]Right [ ]Left [ ]Bilateral  [ ]Crackles        [ ]Right [ ]Left [ ]Bilateral  [ ]Wheezing     [ ]Right [ ]Left [ ]Bilateral  [ xDiminished BS [ ] Right [ ]Left [ xBilateral  CARDIOVASCULAR:    [x] regular [ ]Irregular [ ]Tachy  [ ]Brandin [ ]Murmur [ ]Other  GASTROINTESTINAL:  [ xSoft  [ ]Distended   [ ]+BS  [ ]Non tender [ ]Tender  [ ]Other [ ]PEG [ ]OGT/ NGT   Last BM: 02/21  GENITOURINARY:  [ ]Normal [ ]Incontinent   [ ]Oliguria/Anuria   [x]Gerber  MUSCULOSKELETAL:   [ ]Normal   [ xWeakness  [ ]Bed/Wheelchair bound [ ]Edema  NEUROLOGIC:   [ ]No focal deficits  [ ] Cognitive impairment  [ ] Dysphagia [ ]Dysarthria [ ] Paresis [ ]Other   SKIN:   [ ]Normal  [ ]Rash  [ ]Other  [ ]Pressure ulcer(s) [ ]y [ ]n present on admission    CRITICAL CARE:  [ ]Shock Present  [ ]Septic [ ]Cardiogenic [ ]Neurologic [ ]Hypovolemic  [ ]Vasopressors [ ]Inotropes  [ ]Respiratory failure present [ ]Mechanical Ventilation [ ]Non-invasive ventilatory support [ ]High-Flow   [ ]Acute  [ ]Chronic [ ]Hypoxic  [ ]Hypercarbic [ ]Other  [ ]Other organ failure. DANITZA     LABS:                        9.8    26.30 )-----------( 447      ( 22 Feb 2024 08:27 )             31.4   02-22    140  |  105  |  31<H>  ----------------------------<  139<H>  3.9   |  24  |  1.52<H>    Ca    9.0      22 Feb 2024 08:27  Phos  2.8     02-21  Mg     2.0     02-21    TPro  6.1  /  Alb  2.4<L>  /  TBili  0.5  /  DBili  0.2  /  AST  23  /  ALT  20  /  AlkPhos  133<H>  02-20      Urinalysis Basic - ( 22 Feb 2024 08:27 )    Color: x / Appearance: x / SG: x / pH: x  Gluc: 139 mg/dL / Ketone: x  / Bili: x / Urobili: x   Blood: x / Protein: x / Nitrite: x   Leuk Esterase: x / RBC: x / WBC x   Sq Epi: x / Non Sq Epi: x / Bacteria: x      RADIOLOGY & ADDITIONAL STUDIES:  none new         Protein Calorie Malnutrition Present: [ ]mild [x]moderate [ ]severe [ ]underweight [ ]morbid obesity  https://www.andeal.org/vault/4056/web/files/ONC/Table_Clinical%20Characteristics%20to%20Document%20Malnutrition-White%20JV%20et%20al%202012.pdf    Height (cm): 170.2 (02-20-24 @ 14:59)  Weight (kg): 52.2 (02-20-24 @ 14:59)  BMI (kg/m2): 18 (02-20-24 @ 14:59)    [ ]PPSV2 < or = 30%  [ ]significant weight loss [ ]poor nutritional intake [ ]anasarca[ ]Artificial Nutrition    Other REFERRALS:  [ ]Hospice  [ ]Child Life  [ ]Social Work  [ ]Case management [ ]Holistic Therapy     Palliative Performance Scale:  http://npcrc.org/files/news/palliative_performance_scale_ppsv2.pdf  (Ctrl +  left click to view)  Respiratory Distress Observation Tool:  https://homecareinformation.net/handouts/hen/Respiratory_Distress_Observation_Scale.pdf (Ctrl +  left click to view)  PAINAD Score:  http://geriatrictoolkit.[place].[facility]/cog/painad.pdf (Ctrl +  left click to view)

## 2024-02-22 NOTE — PROGRESS NOTE ADULT - PROBLEM SELECTOR PLAN 11
F: s/p 1L NS  E: K>4 Mg>2  N: Regular  GI: None  DVT: Lovenox 30 subq  Dispo: [facility] F: Encourage PO  E: K>4 Mg>2  N: Regular  GI: None  DVT: Lovenox 30 subq  Dispo: [facility], pending PT eval

## 2024-02-22 NOTE — PROGRESS NOTE ADULT - PROBLEM SELECTOR PLAN 7
Hgb on presentation 9.8, last hgb 11.7 1/2024. Pt denies hematuria, hematemesis, hemoptysis, or melena. +constipation. UA +RBC and large blood. Likely AOCD and nutritional deficiencies i/s/o progressing lung cancer and significant weight loss.  Iron studies noted  Plan:  - maintain active T&S  - Transfuse if Hgb <7 BMI 18, >20 pound weight loss over past 2 weeks.  - nutrition consulted The patient was complaining of constipation overnight. Started on Miralax, however, per nursing, the patient was given senna and had a large bowel movement, therefore, Miralax was held.   He is complaining of abdominal discomfort at this time, improved from admission  Abdominal xray with stool burden.  Plan:  - c/w senna at night, patient refused overnight and simethicone was given  - Miralax PRN

## 2024-02-22 NOTE — PROGRESS NOTE ADULT - PROBLEM SELECTOR PLAN 1
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  - s/p thoracocentesis and Pleurx placement as outpatient   - EBUS FNA bx and BAL 11/2023 +malignant cells. RLL bx +SCC  - Follows with Dr. Castro, determined that patient was candidate for chemoradiation.  - Plan for US tomorrow by pulm   - Pulm following

## 2024-02-22 NOTE — PROGRESS NOTE ADULT - CONVERSATION DETAILS
Met with patient,  Heidi and [place] at bedside.  Patient give permission to discussed with sister about his current medical condition. Sister shared that lives in [place] also they have another sister Yarely. They talk with patient every day by phone. They noticed that patient has been sleeping most of the time and not eating well, they sent food to his apartment. Also noticed some cognitive impairment.     Inquired to patient what is the matter most to him? He states that wants to live day by day, and would accept chemoradiation therapy if it is offer by oncology.   Sister would like patient to move to [place] with her.     Discussed with patient  regarding the importance of designating HCP. Shared with patient that a health care proxy is a legal document that appoints someone else to serve as your health care agent regarding healthcare decisions when you cannot do so for yourself. A HCP speaks on your behalf, expresses your wishes, and has the authority to make medical decisions. A HCP becomes your voice for all of your healthcare decisions.   Patient chose sister Heidi as HCP, form completed, copy in the chart.

## 2024-02-22 NOTE — PROGRESS NOTE ADULT - SUBJECTIVE AND OBJECTIVE BOX
OVERNIGHT EVENTS: Refused Senna. Had 2 BM     SUBJECTIVE:  The patient was seen and examined at the bedside this Am. The patient states that he has been able to eat minimally and that he had 2 BM yesterday. He denies any urination since yesterday.     VITAL SIGNS:  Vital Signs Last 24 Hrs  T(C): 36.4 (22 Feb 2024 12:33), Max: 37.3 (21 Feb 2024 20:44)  T(F): 97.5 (22 Feb 2024 12:33), Max: 99.1 (21 Feb 2024 20:44)  HR: 82 (22 Feb 2024 13:24) (65 - 94)  BP: 111/65 (22 Feb 2024 13:24) (97/50 - 123/64)  BP(mean): --  RR: 16 (22 Feb 2024 12:33) (16 - 18)  SpO2: 94% (22 Feb 2024 12:33) (94% - 96%)    Parameters below as of 22 Feb 2024 12:33  Patient On (Oxygen Delivery Method): room air        PHYSICAL EXAM:  General: NAD; speaking in full sentences  HEENT: NC/AT; PERRL; EOMI; MMM  Neck: supple; no JVD  Cardiac: RRR; +S1/S2  Pulm: CTA B/L; no W/R/R  : Mild distention in suprapubic area   GI: soft, NT/ND, +BS  Extremities: WWP; no edema, clubbing or cyanosis  Vasc: 2+ radial, DP pulses B/L  Neuro: AAOx3; no focal deficits    MEDICATIONS:  MEDICATIONS  (STANDING):  alteplase  Injectable for Pleural Effusion 10 milliGRAM(s) IntraPleural. every 12 hours  atorvastatin 10 milliGRAM(s) Oral at bedtime  dornase maria Solution for Pleural Effusion 5 milliGRAM(s) IntraPleural. every 12 hours  doxazosin 4 milliGRAM(s) Oral at bedtime  finasteride 5 milliGRAM(s) Oral daily  heparin   Injectable 5000 Unit(s) SubCutaneous every 8 hours  lactated ringers. 1000 milliLiter(s) (100 mL/Hr) IV Continuous <Continuous>  polyethylene glycol 3350 17 Gram(s) Oral daily  senna 2 Tablet(s) Oral at bedtime    MEDICATIONS  (PRN):  acetaminophen     Tablet .. 650 milliGRAM(s) Oral every 6 hours PRN Temp greater or equal to 38C (100.4F), Mild Pain (1 - 3)  melatonin 3 milliGRAM(s) Oral at bedtime PRN Sleep      ALLERGIES:  Allergies    No Known Allergies    Intolerances        LABS:                        9.8    26.30 )-----------( 447      ( 22 Feb 2024 08:27 )             31.4     02-22    140  |  105  |  31<H>  ----------------------------<  139<H>  3.9   |  24  |  1.52<H>    Ca    9.0      22 Feb 2024 08:27  Phos  2.8     02-21  Mg     2.0     02-21          RADIOLOGY & ADDITIONAL TESTS: Reviewed. OVERNIGHT EVENTS: Refused Senna. Had 2 BM     SUBJECTIVE:  The patient was seen and examined at the bedside this Am. The patient states that he has been able to eat minimally and that he had 2 BM yesterday. He denies any urination since yesterday.     VITAL SIGNS:  Vital Signs Last 24 Hrs  T(C): 36.4 (22 Feb 2024 12:33), Max: 37.3 (21 Feb 2024 20:44)  T(F): 97.5 (22 Feb 2024 12:33), Max: 99.1 (21 Feb 2024 20:44)  HR: 82 (22 Feb 2024 13:24) (65 - 94)  BP: 111/65 (22 Feb 2024 13:24) (97/50 - 123/64)  BP(mean): --  RR: 16 (22 Feb 2024 12:33) (16 - 18)  SpO2: 94% (22 Feb 2024 12:33) (94% - 96%)    Parameters below as of 22 Feb 2024 12:33  Patient On (Oxygen Delivery Method): room air        PHYSICAL EXAM:  General: cachectic appearing male, NAD; speaking in full sentences  HEENT: NC/AT; PERRL; EOMI; MMM  Neck: supple; no JVD  Cardiac: RRR; +S1/S2  Pulm: CTA B/L; no W/R/R  : Mild distention in suprapubic area   GI: soft, NT/ND, +BS  Extremities: WWP; no edema, clubbing or cyanosis  Vasc: 2+ radial, DP pulses B/L  Neuro: AAOx3; no focal deficits    MEDICATIONS:  MEDICATIONS  (STANDING):  alteplase  Injectable for Pleural Effusion 10 milliGRAM(s) IntraPleural. every 12 hours  atorvastatin 10 milliGRAM(s) Oral at bedtime  dornase maria Solution for Pleural Effusion 5 milliGRAM(s) IntraPleural. every 12 hours  doxazosin 4 milliGRAM(s) Oral at bedtime  finasteride 5 milliGRAM(s) Oral daily  heparin   Injectable 5000 Unit(s) SubCutaneous every 8 hours  lactated ringers. 1000 milliLiter(s) (100 mL/Hr) IV Continuous <Continuous>  polyethylene glycol 3350 17 Gram(s) Oral daily  senna 2 Tablet(s) Oral at bedtime    MEDICATIONS  (PRN):  acetaminophen     Tablet .. 650 milliGRAM(s) Oral every 6 hours PRN Temp greater or equal to 38C (100.4F), Mild Pain (1 - 3)  melatonin 3 milliGRAM(s) Oral at bedtime PRN Sleep      ALLERGIES:  Allergies    No Known Allergies    Intolerances        LABS:                        9.8    26.30 )-----------( 447      ( 22 Feb 2024 08:27 )             31.4     02-22    140  |  105  |  31<H>  ----------------------------<  139<H>  3.9   |  24  |  1.52<H>    Ca    9.0      22 Feb 2024 08:27  Phos  2.8     02-21  Mg     2.0     02-21          RADIOLOGY & ADDITIONAL TESTS: Reviewed.

## 2024-02-22 NOTE — PROGRESS NOTE ADULT - PROBLEM SELECTOR PLAN 8
Last seen urologist prior to COVID, per HIE was seen in 2017. Takes terazosin 5mg and finasteride 5mg at home. Reports chronic nocturia and polyuria that has not particularly worsened.  - c/w home meds Hgb on presentation 9.8, last hgb 11.7 1/2024. Pt denies hematuria, hematemesis, hemoptysis, or melena. +constipation. UA +RBC and large blood. Likely AOCD and nutritional deficiencies i/s/o progressing lung cancer and significant weight loss.  Iron studies noted  Plan:  - maintain active T&S  - Transfuse if Hgb <7 BMI 18, >20 pound weight loss over past 2 weeks.  - nutrition consulted BMI 18, >20 pound weight loss over past 2 weeks.  - nutrition consulted and recommended regular diet with Ensure Enlive twice per day

## 2024-02-23 ENCOUNTER — TRANSCRIPTION ENCOUNTER (OUTPATIENT)
Age: 89
End: 2024-02-23

## 2024-02-23 LAB
ALBUMIN FLD-MCNC: 1.5 G/DL — SIGNIFICANT CHANGE UP
ALBUMIN SERPL ELPH-MCNC: 2.2 G/DL — LOW (ref 3.3–5)
ALP SERPL-CCNC: 120 U/L — SIGNIFICANT CHANGE UP (ref 40–120)
ALT FLD-CCNC: 41 U/L — SIGNIFICANT CHANGE UP (ref 10–45)
ANION GAP SERPL CALC-SCNC: 11 MMOL/L — SIGNIFICANT CHANGE UP (ref 5–17)
ANION GAP SERPL CALC-SCNC: 8 MMOL/L — SIGNIFICANT CHANGE UP (ref 5–17)
AST SERPL-CCNC: 47 U/L — HIGH (ref 10–40)
B PERT IGG+IGM PNL SER: SIGNIFICANT CHANGE UP
BASOPHILS # BLD AUTO: 0.04 K/UL — SIGNIFICANT CHANGE UP (ref 0–0.2)
BASOPHILS NFR BLD AUTO: 0.2 % — SIGNIFICANT CHANGE UP (ref 0–2)
BILIRUB SERPL-MCNC: 0.4 MG/DL — SIGNIFICANT CHANGE UP (ref 0.2–1.2)
BUN SERPL-MCNC: 20 MG/DL — SIGNIFICANT CHANGE UP (ref 7–23)
BUN SERPL-MCNC: 23 MG/DL — SIGNIFICANT CHANGE UP (ref 7–23)
CALCIUM SERPL-MCNC: 8.4 MG/DL — SIGNIFICANT CHANGE UP (ref 8.4–10.5)
CALCIUM SERPL-MCNC: 8.5 MG/DL — SIGNIFICANT CHANGE UP (ref 8.4–10.5)
CHLORIDE SERPL-SCNC: 102 MMOL/L — SIGNIFICANT CHANGE UP (ref 96–108)
CHLORIDE SERPL-SCNC: 99 MMOL/L — SIGNIFICANT CHANGE UP (ref 96–108)
CO2 SERPL-SCNC: 25 MMOL/L — SIGNIFICANT CHANGE UP (ref 22–31)
CO2 SERPL-SCNC: 27 MMOL/L — SIGNIFICANT CHANGE UP (ref 22–31)
COLOR FLD: SIGNIFICANT CHANGE UP
CREAT SERPL-MCNC: 0.89 MG/DL — SIGNIFICANT CHANGE UP (ref 0.5–1.3)
CREAT SERPL-MCNC: 0.91 MG/DL — SIGNIFICANT CHANGE UP (ref 0.5–1.3)
EGFR: 81 ML/MIN/1.73M2 — SIGNIFICANT CHANGE UP
EGFR: 82 ML/MIN/1.73M2 — SIGNIFICANT CHANGE UP
EOSINOPHIL # BLD AUTO: 0.22 K/UL — SIGNIFICANT CHANGE UP (ref 0–0.5)
EOSINOPHIL NFR BLD AUTO: 1 % — SIGNIFICANT CHANGE UP (ref 0–6)
FLUID INTAKE SUBSTANCE CLASS: SIGNIFICANT CHANGE UP
GLUCOSE FLD-MCNC: 14 MG/DL — SIGNIFICANT CHANGE UP
GLUCOSE SERPL-MCNC: 120 MG/DL — HIGH (ref 70–99)
GLUCOSE SERPL-MCNC: 95 MG/DL — SIGNIFICANT CHANGE UP (ref 70–99)
HCT VFR BLD CALC: 27.1 % — LOW (ref 39–50)
HGB BLD-MCNC: 8.7 G/DL — LOW (ref 13–17)
IMM GRANULOCYTES NFR BLD AUTO: 0.7 % — SIGNIFICANT CHANGE UP (ref 0–0.9)
LDH SERPL L TO P-CCNC: >2500 U/L — SIGNIFICANT CHANGE UP
LYMPHOCYTES # BLD AUTO: 1.72 K/UL — SIGNIFICANT CHANGE UP (ref 1–3.3)
LYMPHOCYTES # BLD AUTO: 7.4 % — LOW (ref 13–44)
LYMPHOCYTES # FLD: 2 % — SIGNIFICANT CHANGE UP
MAGNESIUM SERPL-MCNC: 1.9 MG/DL — SIGNIFICANT CHANGE UP (ref 1.6–2.6)
MCHC RBC-ENTMCNC: 25.4 PG — LOW (ref 27–34)
MCHC RBC-ENTMCNC: 32.1 GM/DL — SIGNIFICANT CHANGE UP (ref 32–36)
MCV RBC AUTO: 79 FL — LOW (ref 80–100)
MONOCYTES # BLD AUTO: 1.44 K/UL — HIGH (ref 0–0.9)
MONOCYTES NFR BLD AUTO: 6.2 % — SIGNIFICANT CHANGE UP (ref 2–14)
MONOS+MACROS # FLD: 3 % — SIGNIFICANT CHANGE UP
MRSA PCR RESULT.: NEGATIVE — SIGNIFICANT CHANGE UP
NEUTROPHILS # BLD AUTO: 19.51 K/UL — HIGH (ref 1.8–7.4)
NEUTROPHILS NFR BLD AUTO: 84.5 % — HIGH (ref 43–77)
NEUTROPHILS-BODY FLUID: 95 % — SIGNIFICANT CHANGE UP
NRBC # BLD: 0 /100 WBCS — SIGNIFICANT CHANGE UP (ref 0–0)
PH, PLEURAL FLUID: 7.42 — SIGNIFICANT CHANGE UP
PHOSPHATE SERPL-MCNC: 2.7 MG/DL — SIGNIFICANT CHANGE UP (ref 2.5–4.5)
PLATELET # BLD AUTO: 398 K/UL — SIGNIFICANT CHANGE UP (ref 150–400)
POTASSIUM SERPL-MCNC: 3.2 MMOL/L — LOW (ref 3.5–5.3)
POTASSIUM SERPL-MCNC: 3.9 MMOL/L — SIGNIFICANT CHANGE UP (ref 3.5–5.3)
POTASSIUM SERPL-SCNC: 3.2 MMOL/L — LOW (ref 3.5–5.3)
POTASSIUM SERPL-SCNC: 3.9 MMOL/L — SIGNIFICANT CHANGE UP (ref 3.5–5.3)
PROT FLD-MCNC: 3.3 G/DL — SIGNIFICANT CHANGE UP
PROT SERPL-MCNC: 4.8 G/DL — LOW (ref 6–8.3)
RBC # BLD: 3.43 M/UL — LOW (ref 4.2–5.8)
RBC # FLD: 15.8 % — HIGH (ref 10.3–14.5)
RCV VOL RI: HIGH /UL (ref 0–0)
S AUREUS DNA NOSE QL NAA+PROBE: NEGATIVE — SIGNIFICANT CHANGE UP
SODIUM SERPL-SCNC: 135 MMOL/L — SIGNIFICANT CHANGE UP (ref 135–145)
SODIUM SERPL-SCNC: 137 MMOL/L — SIGNIFICANT CHANGE UP (ref 135–145)
SPECIMEN SOURCE FLD: SIGNIFICANT CHANGE UP
TOTAL NUCLEATED CELL COUNT, BODY FLUID: SIGNIFICANT CHANGE UP /UL
TUBE TYPE: SIGNIFICANT CHANGE UP
WBC # BLD: 23.09 K/UL — HIGH (ref 3.8–10.5)
WBC # FLD AUTO: 23.09 K/UL — HIGH (ref 3.8–10.5)

## 2024-02-23 PROCEDURE — 99233 SBSQ HOSP IP/OBS HIGH 50: CPT | Mod: GC

## 2024-02-23 PROCEDURE — 99232 SBSQ HOSP IP/OBS MODERATE 35: CPT

## 2024-02-23 RX ORDER — POTASSIUM CHLORIDE 20 MEQ
40 PACKET (EA) ORAL ONCE
Refills: 0 | Status: COMPLETED | OUTPATIENT
Start: 2024-02-23 | End: 2024-02-23

## 2024-02-23 RX ORDER — PIPERACILLIN AND TAZOBACTAM 4; .5 G/20ML; G/20ML
4.5 INJECTION, POWDER, LYOPHILIZED, FOR SOLUTION INTRAVENOUS ONCE
Refills: 0 | Status: DISCONTINUED | OUTPATIENT
Start: 2024-02-23 | End: 2024-02-23

## 2024-02-23 RX ORDER — PIPERACILLIN AND TAZOBACTAM 4; .5 G/20ML; G/20ML
4.5 INJECTION, POWDER, LYOPHILIZED, FOR SOLUTION INTRAVENOUS EVERY 8 HOURS
Refills: 0 | Status: DISCONTINUED | OUTPATIENT
Start: 2024-02-23 | End: 2024-02-29

## 2024-02-23 RX ORDER — PIPERACILLIN AND TAZOBACTAM 4; .5 G/20ML; G/20ML
4.5 INJECTION, POWDER, LYOPHILIZED, FOR SOLUTION INTRAVENOUS ONCE
Refills: 0 | Status: COMPLETED | OUTPATIENT
Start: 2024-02-23 | End: 2024-02-23

## 2024-02-23 RX ORDER — VANCOMYCIN HCL 1 G
1000 VIAL (EA) INTRAVENOUS EVERY 12 HOURS
Refills: 0 | Status: DISCONTINUED | OUTPATIENT
Start: 2024-02-23 | End: 2024-02-23

## 2024-02-23 RX ORDER — VANCOMYCIN HCL 1 G
750 VIAL (EA) INTRAVENOUS EVERY 24 HOURS
Refills: 0 | Status: DISCONTINUED | OUTPATIENT
Start: 2024-02-23 | End: 2024-02-25

## 2024-02-23 RX ADMIN — Medication 650 MILLIGRAM(S): at 12:35

## 2024-02-23 RX ADMIN — SODIUM CHLORIDE 100 MILLILITER(S): 9 INJECTION, SOLUTION INTRAVENOUS at 06:40

## 2024-02-23 RX ADMIN — Medication 40 MILLIEQUIVALENT(S): at 10:56

## 2024-02-23 RX ADMIN — ALTEPLASE 10 MILLIGRAM(S): KIT at 19:21

## 2024-02-23 RX ADMIN — Medication 650 MILLIGRAM(S): at 21:15

## 2024-02-23 RX ADMIN — PIPERACILLIN AND TAZOBACTAM 200 GRAM(S): 4; .5 INJECTION, POWDER, LYOPHILIZED, FOR SOLUTION INTRAVENOUS at 12:14

## 2024-02-23 RX ADMIN — PIPERACILLIN AND TAZOBACTAM 25 GRAM(S): 4; .5 INJECTION, POWDER, LYOPHILIZED, FOR SOLUTION INTRAVENOUS at 19:40

## 2024-02-23 RX ADMIN — Medication 650 MILLIGRAM(S): at 22:15

## 2024-02-23 RX ADMIN — HEPARIN SODIUM 5000 UNIT(S): 5000 INJECTION INTRAVENOUS; SUBCUTANEOUS at 06:34

## 2024-02-23 RX ADMIN — Medication 650 MILLIGRAM(S): at 13:35

## 2024-02-23 RX ADMIN — SENNA PLUS 2 TABLET(S): 8.6 TABLET ORAL at 21:14

## 2024-02-23 RX ADMIN — ATORVASTATIN CALCIUM 10 MILLIGRAM(S): 80 TABLET, FILM COATED ORAL at 21:15

## 2024-02-23 RX ADMIN — Medication 4 MILLIGRAM(S): at 21:15

## 2024-02-23 RX ADMIN — FINASTERIDE 5 MILLIGRAM(S): 5 TABLET, FILM COATED ORAL at 10:56

## 2024-02-23 RX ADMIN — DORNASE ALFA 5 MILLIGRAM(S): 1 SOLUTION RESPIRATORY (INHALATION) at 19:22

## 2024-02-23 RX ADMIN — Medication 250 MILLIGRAM(S): at 14:15

## 2024-02-23 RX ADMIN — HEPARIN SODIUM 5000 UNIT(S): 5000 INJECTION INTRAVENOUS; SUBCUTANEOUS at 13:19

## 2024-02-23 RX ADMIN — HEPARIN SODIUM 5000 UNIT(S): 5000 INJECTION INTRAVENOUS; SUBCUTANEOUS at 21:15

## 2024-02-23 NOTE — PROGRESS NOTE ADULT - PROBLEM SELECTOR PLAN 1
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  - s/p thoracocentesis and Pleurx placement as outpatient   - EBUS FNA bx and BAL 11/2023 +malignant cells. RLL bx +SCC  - Follows with Dr. Castro, determined that patient was candidate for chemoradiation.  - Pulm following.

## 2024-02-23 NOTE — PROGRESS NOTE ADULT - PROBLEM SELECTOR PLAN 6
Pt presenting w/ WBC 24 (last WBC ~15 1/2024), elevated neutrophils, as well as productive cough w/ yellow/brown phlegm. Pt reports night sweats, fatigue, and weight loss however denies fevers/chills at home. Mostly homebound and no recent sick contacts. s/p 1000cc NS bolus in ED. Lactate WNL. Platelets elevated, and i/s/o poor PO intake, suspect some hemoconcentration.  ESR elevated  Plan:  - watch off abx at this time  - f/u BCx

## 2024-02-23 NOTE — PHYSICAL THERAPY INITIAL EVALUATION ADULT - PERTINENT HX OF CURRENT PROBLEM, REHAB EVAL
88M PMH lung cancer with pleural effusions s/p Pleurx placement prior to this hospitalization, BPH, neuropathy, and anxiety presenting with progressive weakness, dyspnea on exertion, and inability to care for self at home. Admitted to [facility] for further evaluation and management of Failure to Thrive. Course c/b urinary retention needed ruth placement on 2/22. I personally performed the service described in the documentation recorded by the scribe in my presence, and it accurately and completely records my words and actions.

## 2024-02-23 NOTE — PROGRESS NOTE ADULT - PROBLEM SELECTOR PLAN 10
Last seen urologist prior to COVID, per HIE was seen in 2017. Takes terazosin 5mg and finasteride 5mg at home. Reports chronic nocturia and polyuria that has not particularly worsened.  - c/w home meds  - TOV in 3 days given acute urinary retention

## 2024-02-23 NOTE — OCCUPATIONAL THERAPY INITIAL EVALUATION ADULT - PERTINENT HX OF CURRENT PROBLEM, REHAB EVAL
88M PMH lung cancer with pleural effusions s/p Pleurx placement prior to this hospitalization, BPH, neuropathy, and anxiety presenting with progressive weakness, dyspnea on exertion, and inability to care for self at home. Admitted to [facility] for further evaluation and management of Failure to Thrive. Course c/b urinary retention needed ruth placement on 2/22.

## 2024-02-23 NOTE — PHYSICAL THERAPY INITIAL EVALUATION ADULT - GENERAL OBSERVATIONS, REHAB EVAL
PT IE completed. Patient received semi supine in bed +IV, +chest tube, +ruth, NAD, willing to work with PT.

## 2024-02-23 NOTE — PROGRESS NOTE ADULT - PROBLEM SELECTOR PLAN 7
The patient was complaining of constipation overnight. Started on Miralax, however, per nursing, the patient was given senna and had a large bowel movement, therefore, Miralax was held.   He is complaining of abdominal discomfort at this time, improved from admission  Abdominal xray with stool burden.  Plan:  - c/w senna at night, patient refused overnight and simethicone was given  - Miralax PRN IMPROVING  The patient was complaining of constipation overnight. Started on Miralax, however, per nursing, the patient was given senna and had a large bowel movement, therefore, Miralax was held.   He is complaining of abdominal discomfort at this time, improved from admission  Abdominal xray with stool burden.  Plan:  - c/w senna at night, patient refused overnight and simethicone was given  - Miralax PRN

## 2024-02-23 NOTE — PROGRESS NOTE ADULT - SUBJECTIVE AND OBJECTIVE BOX
HOSPITAL COURSE:      OVERNIGHT EVENTS: KAMINI    SUBJECTIVE:  The patient was seen and examined at the bedside. He reported no issues today.     VITAL SIGNS:  Vital Signs Last 24 Hrs  T(C): 36.7 (23 Feb 2024 12:19), Max: 37.2 (22 Feb 2024 21:37)  T(F): 98 (23 Feb 2024 12:19), Max: 99 (22 Feb 2024 21:37)  HR: 100 (23 Feb 2024 12:19) (74 - 100)  BP: 102/57 (23 Feb 2024 12:19) (99/50 - 117/57)  BP(mean): --  RR: 16 (23 Feb 2024 12:19) (16 - 17)  SpO2: 94% (23 Feb 2024 12:19) (92% - 99%)    Parameters below as of 23 Feb 2024 12:19  Patient On (Oxygen Delivery Method): room air        PHYSICAL EXAM:  General: NAD; speaking in full sentences  HEENT: NC/AT; PERRL; EOMI; MMM  Neck: supple; no JVD  Cardiac: RRR; +S1/S2  Pulm: CTA B/L; no W/R/R  GI: soft, NT/ND, +BS  Extremities: WWP; no edema, clubbing or cyanosis  Vasc: 2+ radial, DP pulses B/L  Neuro: AAOx3; no focal deficits    MEDICATIONS:  MEDICATIONS  (STANDING):  alteplase  Injectable for Pleural Effusion 10 milliGRAM(s) IntraPleural. every 12 hours  atorvastatin 10 milliGRAM(s) Oral at bedtime  dornase maria Solution for Pleural Effusion 5 milliGRAM(s) IntraPleural. every 12 hours  doxazosin 4 milliGRAM(s) Oral at bedtime  finasteride 5 milliGRAM(s) Oral daily  heparin   Injectable 5000 Unit(s) SubCutaneous every 8 hours  piperacillin/tazobactam IVPB.. 4.5 Gram(s) IV Intermittent every 8 hours  polyethylene glycol 3350 17 Gram(s) Oral daily  senna 2 Tablet(s) Oral at bedtime  vancomycin  IVPB 750 milliGRAM(s) IV Intermittent every 24 hours    MEDICATIONS  (PRN):  acetaminophen     Tablet .. 650 milliGRAM(s) Oral every 6 hours PRN Temp greater or equal to 38C (100.4F), Mild Pain (1 - 3)  melatonin 3 milliGRAM(s) Oral at bedtime PRN Sleep      ALLERGIES:  Allergies    No Known Allergies    Intolerances        LABS:                        8.7    23.09 )-----------( 398      ( 23 Feb 2024 07:59 )             27.1     02-23    135  |  99  |  23  ----------------------------<  120<H>  3.9   |  25  |  0.91    Ca    8.4      23 Feb 2024 14:49  Phos  2.7     02-23  Mg     1.9     02-23    TPro  4.8<L>  /  Alb  2.2<L>  /  TBili  0.4  /  DBili  x   /  AST  47<H>  /  ALT  41  /  AlkPhos  120  02-23        RADIOLOGY & ADDITIONAL TESTS: Reviewed. HOSPITAL COURSE:  88M PMH lung cancer with pleural effusions s/p Pleurx placement prior to this hospitalization, BPH, neuropathy, and anxiety presenting with progressive weakness, dyspnea on exertion, and inability to care for self at home. Admitted to [facility] for further evaluation and management of Failure to Thrive. Course c/b urinary retention needed ruth placement on 2/22. The patient is being followed by pulmonology for the Pleurx drainage, fluid studies were sent and there is concern for infection for which he was started on Vanc and Zosyn. Currently on TPA/Dornase MIST 2 protocol.      OVERNIGHT EVENTS: KAMINI    SUBJECTIVE:  The patient was seen and examined at the bedside. He reported no issues today.     VITAL SIGNS:  Vital Signs Last 24 Hrs  T(C): 36.7 (23 Feb 2024 12:19), Max: 37.2 (22 Feb 2024 21:37)  T(F): 98 (23 Feb 2024 12:19), Max: 99 (22 Feb 2024 21:37)  HR: 100 (23 Feb 2024 12:19) (74 - 100)  BP: 102/57 (23 Feb 2024 12:19) (99/50 - 117/57)  BP(mean): --  RR: 16 (23 Feb 2024 12:19) (16 - 17)  SpO2: 94% (23 Feb 2024 12:19) (92% - 99%)    Parameters below as of 23 Feb 2024 12:19  Patient On (Oxygen Delivery Method): room air        PHYSICAL EXAM:  General: NAD; speaking in full sentences  HEENT: NC/AT; PERRL; EOMI; MMM  Neck: supple; no JVD  Cardiac: RRR; +S1/S2  Pulm: CTA B/L; no W/R/R  GI: soft, NT/ND, +BS  Extremities: WWP; no edema, clubbing or cyanosis  Vasc: 2+ radial, DP pulses B/L  Neuro: AAOx3; no focal deficits    MEDICATIONS:  MEDICATIONS  (STANDING):  alteplase  Injectable for Pleural Effusion 10 milliGRAM(s) IntraPleural. every 12 hours  atorvastatin 10 milliGRAM(s) Oral at bedtime  dornase maria Solution for Pleural Effusion 5 milliGRAM(s) IntraPleural. every 12 hours  doxazosin 4 milliGRAM(s) Oral at bedtime  finasteride 5 milliGRAM(s) Oral daily  heparin   Injectable 5000 Unit(s) SubCutaneous every 8 hours  piperacillin/tazobactam IVPB.. 4.5 Gram(s) IV Intermittent every 8 hours  polyethylene glycol 3350 17 Gram(s) Oral daily  senna 2 Tablet(s) Oral at bedtime  vancomycin  IVPB 750 milliGRAM(s) IV Intermittent every 24 hours    MEDICATIONS  (PRN):  acetaminophen     Tablet .. 650 milliGRAM(s) Oral every 6 hours PRN Temp greater or equal to 38C (100.4F), Mild Pain (1 - 3)  melatonin 3 milliGRAM(s) Oral at bedtime PRN Sleep      ALLERGIES:  Allergies    No Known Allergies    Intolerances        LABS:                        8.7    23.09 )-----------( 398      ( 23 Feb 2024 07:59 )             27.1     02-23    135  |  99  |  23  ----------------------------<  120<H>  3.9   |  25  |  0.91    Ca    8.4      23 Feb 2024 14:49  Phos  2.7     02-23  Mg     1.9     02-23    TPro  4.8<L>  /  Alb  2.2<L>  /  TBili  0.4  /  DBili  x   /  AST  47<H>  /  ALT  41  /  AlkPhos  120  02-23        RADIOLOGY & ADDITIONAL TESTS: Reviewed.

## 2024-02-23 NOTE — PROGRESS NOTE ADULT - PROBLEM SELECTOR PLAN 2
In the setting of malignancy and advance age.   - PPSV 50 %  - Lives alone  - PT and rehab consulted, pt agree to work this afternoon with PT

## 2024-02-23 NOTE — PROGRESS NOTE ADULT - PROBLEM SELECTOR PLAN 1
I/s/o multiple medical problems including known malignancy, chronic gait instability. Progressive weight loss since lung cancer diagnosis, however reports >20 pound weight loss over past 2 weeks. Lives alone and unable to cook for self as becomes fatigued and has unstable gait.   Plan:  - regular diet  - nutrition consulted  - PT/OT tri, patient refusing multiple times  - palliative on board, f/u recs I/s/o multiple medical problems including known malignancy, chronic gait instability. Progressive weight loss since lung cancer diagnosis, however reports >20 pound weight loss over past 2 weeks. Lives alone and unable to cook for self as becomes fatigued and has unstable gait.   Plan:  - regular diet  - nutrition consulted  - PT evaluated and recommended [facility]CHO   - palliative on board, f/u recs

## 2024-02-23 NOTE — DISCHARGE NOTE PROVIDER - PROVIDER TOKENS
PROVIDER:[TOKEN:[[id number]]],PROVIDER:[TOKEN:[[id number]],SCHEDULEDAPPT:[03/04/2024],SCHEDULEDAPPTTIME:[01:30 PM]],PROVIDER:[TOKEN:[[id number]],SCHEDULEDAPPT:[04/08/2024],SCHEDULEDAPPTTIME:[04:00 PM]]

## 2024-02-23 NOTE — PROGRESS NOTE ADULT - ASSESSMENT
88M PMH squamous cell lung cancer, BPH, peripheral neuropathy, and anxiety presenting with progressive weakness, dyspnea on exertion, fatigue, and weight loss x2 weeks. Pulmonary consulted for known MPE and IPC.     #Malignant Pleural Effusion s/p IPC placement   #SCC of the Lung, Stage IIIA     Presenting with failure to thrive in the setting of known malignancy which has yet to be treated. Pulmonary consulted for known MPE s/p IPC placement by Dr. Lawrence. Currently on room air saturating well. Has leukocytosis, but otherwise no focal infectious symptoms. On bedside US, right sided small pleural effusion with visible septations. Per Dr. Lawrence who placed pleurx, effusion was septated when pleurx was initially placed one month ago. Initially unable to drain pleurx, instilled 5 mg tPA and now draining freely. Minimal output and cell count shows 95% neutrophils. Pending remainder of fluid studies, pt may need lytics and abx.    Recommendations:  NOTE INCOMPLETE  88M PMH squamous cell lung cancer, BPH, peripheral neuropathy, and anxiety presenting with progressive weakness, dyspnea on exertion, fatigue, and weight loss x2 weeks. Pulmonary consulted for known MPE and IPC.     #Malignant Pleural Effusion s/p IPC placement   #SCC of the Lung, Stage IIIA     Presenting with failure to thrive in the setting of known malignancy which has yet to be treated. Pulmonary consulted for known MPE s/p IPC placement by Dr. Lawrence. Currently on room air saturating well. Has leukocytosis, but otherwise no focal infectious symptoms. On bedside US, right sided small pleural effusion with visible septations. Per Dr. Lawrence who placed pleurx, effusion was septated when pleurx was initially placed one month ago. Initially unable to drain pleurx, instilled 5 mg tPA and now draining freely. Minimal output and cell count shows 95% neutrophils. Glucose 14 and LDH >2500, concerning for complicated parapneumonic effusion.    Recommendations:  -will start MIST 2 protocol with tPA/dornase instilled through pleurx twice daily  -pulm will manage pleurx connected to chest tube  -c/w broad spectrum abx    Case s/e/d with Dr. Stewart 88M PMH squamous cell lung cancer, BPH, peripheral neuropathy, and anxiety presenting with progressive weakness, dyspnea on exertion, fatigue, and weight loss x2 weeks. Pulmonary consulted for known MPE and IPC.     #Malignant Pleural Effusion s/p IPC placement   #SCC of the Lung, Stage IIIA     Presenting with failure to thrive in the setting of known malignancy which has yet to be treated. Pulmonary consulted for known MPE s/p IPC placement by Dr. Lawrence. Currently on room air saturating well. Has leukocytosis, but otherwise no focal infectious symptoms. On bedside US, right sided small pleural effusion with visible septations. Per Dr. Lawrence who placed pleurx, effusion was septated when pleurx was initially placed one month ago. Initially unable to drain pleurx, instilled 5 mg tPA and now draining freely. Minimal output and cell count shows 95% neutrophils. Glucose 14 and LDH >2500, concerning for complicated parapneumonic effusion.    Recommendations:  -will start MIST 2 protocol with tPA/dornase instilled through pleurx twice daily (max 6 doses)  -pulm will manage pleurx connected to chest tube  -c/w broad spectrum abx    Case s/e/d with Dr. Stewart

## 2024-02-23 NOTE — DISCHARGE NOTE PROVIDER - NSDCFUSCHEDAPPT_GEN_ALL_CORE_FT
[facility]  PALLIATIVE [address]  Scheduled Appointment: 03/04/2024    Giles Lawrence  [facility]  PULMMED [address]  Scheduled Appointment: 04/08/2024

## 2024-02-23 NOTE — PROGRESS NOTE ADULT - PROBLEM SELECTOR PLAN 11
F: Encourage PO  E: K>4 Mg>2  N: Regular  GI: None  DVT: Lovenox 30 subq  Dispo: RMF, pending PT eval F: Encourage PO  E: K>4 Mg>2  N: Regular  GI: None  DVT: Lovenox 30 subq  Dispo: [facility]CHO per PT

## 2024-02-23 NOTE — DISCHARGE NOTE PROVIDER - NSDCCPCAREPLAN_GEN_ALL_CORE_FT
PRINCIPAL DISCHARGE DIAGNOSIS  Diagnosis: Pleural effusion  Assessment and Plan of Treatment: Pleural effusion is a collection of fluid around your lungs. Your provider has to get rid of this fluid with medicine or a procedure. Also, they need to diagnose and treat the condition that caused pleural effusion. Treatments range from medicine to surgery. Pleural effusion can happen more than once. Your outlook depends on what caused it.  Our Pulmonology Team evaluated you for the effusion and they helped drain as much fluid as possible via the Pleurx Catheter. We started you on antibiotics in order to fight an infection that was seen in the fluid when it was analyzed.   INSTRUCTIONS:  - continue taking ___  - Follow up with the pulmonary team (Dr Lawrence) at your scheduled appointment   - Follow up with Dr Rodriguez (Palliative Care) when you leave the hospital      SECONDARY DISCHARGE DIAGNOSES  Diagnosis: Urinary retention  Assessment and Plan of Treatment: Urinary retention occurs when you are not able to completely empty your bladder. It can be:  acute — if your bladder feels full but you can’t pass any urine  chronic — if you can pass urine, but your bladder is still partially full when you finish  Urinary retention needs to be treated. If not, it can cause kidney or bladder damage.  Instructions:  Gerber Care Instructions:  To take care of your Gerber catheter, you will need to:  - Clean your catheter every day.  - Change your drainage bags. You will change your drainage bag 2 times a day:  - In the morning, change the night bag to the leg bag.  - At night before you go to bed, change the leg bag to the night bag.  - Replace your drainage bags with new bags once a week. You should also change your drainage bag if it gets clogged or blocked.  - Wash your drainage bags every day.  - Drink 1 to 2 glasses of liquids every 2 hours while you’re awake to keep you hydrated.  You may see some blood or urine around where the catheter enters your body. This may happen when you’re walking or having a bowel movement (pooping). This is normal if there’s urine draining into the drainage bag. If you do not have urine draining into the drainage bag, call your healthcare provider.     PRINCIPAL DISCHARGE DIAGNOSIS  Diagnosis: Pleural effusion  Assessment and Plan of Treatment: Pleural effusion is a collection of fluid around your lungs. Your provider has to get rid of this fluid with medicine or a procedure. Also, they need to diagnose and treat the condition that caused pleural effusion. Treatments range from medicine to surgery. Pleural effusion can happen more than once. Your outlook depends on what caused it.  Our Pulmonology Team evaluated you for the effusion and they helped drain as much fluid as possible via the Pleurx Catheter. We started you on antibiotics in order to fight an infection that was seen in the fluid when it was analyzed.   INSTRUCTIONS:  - continue taking Augmentin 500/125 mg for an additional 7 days.   - Follow up with the pulmonary team (Dr Lawrence) at your scheduled appointment   - Follow up with Dr Rodriguez (Palliative Care) when you leave the hospital  - You will have the Pleurx drained weekly as recommended by the pulmonology team.      SECONDARY DISCHARGE DIAGNOSES  Diagnosis: Urinary retention  Assessment and Plan of Treatment: Urinary retention occurs when you are not able to completely empty your bladder. It can be:  acute — if your bladder feels full but you can’t pass any urine  chronic — if you can pass urine, but your bladder is still partially full when you finish  Urinary retention needs to be treated. If not, it can cause kidney or bladder damage.  Instructions:  Gerber Care Instructions:  To take care of your Gerber catheter, you will need to:  - Clean your catheter every day.  - Change your drainage bags. You will change your drainage bag 2 times a day:  - In the morning, change the night bag to the leg bag.  - At night before you go to bed, change the leg bag to the night bag.  - Replace your drainage bags with new bags once a week. You should also change your drainage bag if it gets clogged or blocked.  - Wash your drainage bags every day.  - Drink 1 to 2 glasses of liquids every 2 hours while you’re awake to keep you hydrated.  You may see some blood or urine around where the catheter enters your body. This may happen when you’re walking or having a bowel movement (pooping). This is normal if there’s urine draining into the drainage bag. If you do not have urine draining into the drainage bag, call your healthcare provider.

## 2024-02-23 NOTE — DISCHARGE NOTE PROVIDER - NSDCMRMEDTOKEN_GEN_ALL_CORE_FT
finasteride 5 mg oral tablet: 1 tab(s) orally once a day  KlonoPIN 0.125 mg oral tablet: 1 tab(s) orally once a day as needed for  anxiety  Lipitor 10 mg oral tablet: 1 tab(s) orally once a day  terazosin 5 mg oral tablet: 5 milligram(s) orally once a day   acetaminophen 325 mg oral tablet: 2 tab(s) orally every 6 hours As needed Temp greater or equal to 38C (100.4F), Mild Pain (1 - 3)  doxazosin 4 mg oral tablet: 1 tab(s) orally once a day (at bedtime)  finasteride 5 mg oral tablet: 1 tab(s) orally once a day  heparin: 5000 units Subq every 8 hours for prophylaxis  KlonoPIN 0.125 mg oral tablet: 1 tab(s) orally once a day as needed for  anxiety  Lipitor 10 mg oral tablet: 1 tab(s) orally once a day  melatonin 3 mg oral tablet: 1 tab(s) orally once a day (at bedtime) As needed Sleep  pantoprazole 40 mg oral delayed release tablet: 1 tab(s) orally once a day (before a meal)  polyethylene glycol 3350 oral powder for reconstitution: 17 gram(s) orally every 12 hours  senna leaf extract oral tablet: 2 tab(s) orally once a day (at bedtime)  terazosin 5 mg oral tablet: 5 milligram(s) orally once a day   acetaminophen 325 mg oral tablet: 2 tab(s) orally every 6 hours As needed Temp greater or equal to 38C (100.4F), Mild Pain (1 - 3)  amoxicillin-clavulanate 500 mg-125 mg oral tablet: 500 milligram(s) orally every 8 hours  doxazosin 4 mg oral tablet: 1 tab(s) orally once a day (at bedtime)  finasteride 5 mg oral tablet: 1 tab(s) orally once a day  heparin: 5,000 unit(s) subcutaneous every 8 hours 5000 units Subq every 8 hours for prophylaxis  KlonoPIN 0.125 mg oral tablet: 1 tab(s) orally once a day as needed for  anxiety  Lipitor 10 mg oral tablet: 1 tab(s) orally once a day  melatonin 3 mg oral tablet: 1 tab(s) orally once a day (at bedtime) As needed Sleep  pantoprazole 40 mg oral delayed release tablet: 1 tab(s) orally once a day (before a meal)  polyethylene glycol 3350 oral powder for reconstitution: 17 gram(s) orally every 12 hours  senna leaf extract oral tablet: 2 tab(s) orally once a day (at bedtime)  terazosin 5 mg oral tablet: 5 milligram(s) orally once a day   acetaminophen 325 mg oral tablet: 2 tab(s) orally every 6 hours As needed Temp greater or equal to 38C (100.4F), Mild Pain (1 - 3)  amoxicillin-clavulanate 500 mg-125 mg oral tablet: 500 milligram(s) orally every 8 hours  doxazosin 4 mg oral tablet: 1 tab(s) orally once a day (at bedtime)  finasteride 5 mg oral tablet: 1 tab(s) orally once a day  heparin: 5,000 unit(s) subcutaneous every 8 hours 5000 units Subq every 8 hours for prophylaxis  KlonoPIN 0.125 mg oral tablet: 1 tab(s) orally once a day as needed for  anxiety  Lipitor 10 mg oral tablet: 1 tab(s) orally once a day  melatonin 3 mg oral tablet: 1 tab(s) orally once a day (at bedtime) As needed Sleep  pantoprazole 40 mg oral delayed release tablet: 1 tab(s) orally once a day (before a meal)  terazosin 5 mg oral tablet: 5 milligram(s) orally once a day

## 2024-02-23 NOTE — PHYSICAL THERAPY INITIAL EVALUATION ADULT - GAIT DEVIATIONS NOTED, PT EVAL
decreased kacie/increased time in double stance/decreased velocity of limb motion/decreased step length/decreased stride length/decreased weight-shifting ability

## 2024-02-23 NOTE — PROGRESS NOTE ADULT - PROBLEM SELECTOR PLAN 4
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 Follows with Dr Lawrence and Dr Fletcher (oncologist). s/p bronchoscopy, pleurx placement, and thoracentesis outpatient. EBUS FNA bx and BAL 11/2023 +malignant cells. RLL bx +SCC. Pending radiation treatment, last seen 12/2023 and was determined to be a good candidate for chemoradiation at that time however did not receive tx due to large pleural effusion. Now p/w FTT, progressive dyspnea, fatigue, weight loss, night sweats.  Plan:  - palliative consulted to aid in decision about possible chemo/radiation therapy  - Pulm on board as well   - encourage PO intake  - PT/OT eval. 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 Follows with Dr Lawrence and Dr Fletcher (oncologist). s/p bronchoscopy, pleurx placement, and thoracentesis outpatient. EBUS FNA bx and BAL 11/2023 +malignant cells. RLL bx +SCC. Pending radiation treatment, last seen 12/2023 and was determined to be a good candidate for chemoradiation at that time however did not receive tx due to large pleural effusion. Now p/w FTT, progressive dyspnea, fatigue, weight loss, night sweats.  Plan:  - palliative consulted to aid in decision about possible chemo/radiation therapy  - Pulm on board as well, pleural fluid analysis sent, pending results  - encourage PO intake

## 2024-02-23 NOTE — PHYSICAL THERAPY INITIAL EVALUATION ADULT - ADDITIONAL COMMENTS
Patient is a limited household ambulator who lives alone in elevator access apartment, [address]. Ambulates with RW and has HHA services throughout the week to assist with ADLs. Unclear frequency of HHA.

## 2024-02-23 NOTE — PROGRESS NOTE ADULT - PROBLEM SELECTOR PLAN 5
CXR this admission with redemonstrated pleural effusion. Follows with Dr Lawrence outpatient. Currently comfortable on RA, however pt reports dyspnea on exertion and discomfort at pleurx site.   Plan:  - Pulm on board, plan for TPA/Dornase today   - Drain Plurx as scheduled   - Pending Palliative conversation for further care CXR this admission with redemonstrated pleural effusion. Follows with Dr Lawrence outpatient. Currently comfortable on RA, however pt reports dyspnea on exertion and discomfort at pleurx site.   s/p TPA with Pulm on 2/22  Plan:  - pending pleural fluid analysis   - Drain Plurx as scheduled   - Pending Palliative conversation for further care

## 2024-02-23 NOTE — OCCUPATIONAL THERAPY INITIAL EVALUATION ADULT - MODIFIED CLINICAL TEST OF SENSORY INTEGRATION IN BALANCE TEST
Pt able to take ~5 side steps along EOB with min x2 person assist, as standing time progressed pt with tremors and shuffling gait. Flexed posture throughout.

## 2024-02-23 NOTE — PROGRESS NOTE ADULT - PROBLEM SELECTOR PLAN 3
Patient with Hx of BPH on medications outpatient. BS today showed 1.7L of urine in bladder. Trial of straight cath was unsuccessful therefore urology was called for Gerber placement. 1.5L was removed from bladder.   Plan:  - watch for post-obstructive diuresis   - Continue Gerber for at least 3 days per urology recs and TOV afterwards. Patient with Hx of BPH on medications outpatient. BS today showed 1.7L of urine in bladder. Trial of straight cath was unsuccessful therefore urology was called for Ruth placement. 1.5L was removed from bladder.   Urine output appropriate after the large amount of urine extracted during ruth placement  Plan:  - Stopped fluids this AM.   - watch for post-obstructive diuresis   - Continue Ruth for at least 3 days per urology recs and TOV afterwards.

## 2024-02-23 NOTE — PROGRESS NOTE ADULT - ASSESSMENT
88M PMH lung cancer with pleural effusions s/p Pleurx placement prior to this hospitalization, BPH, neuropathy, and anxiety presenting with progressive weakness, dyspnea on exertion, and inability to care for self at home. Admitted to [facility] for further evaluation and management of Failure to Thrive. Course c/b urinary retention needed ruth placement on 2/22.

## 2024-02-23 NOTE — DISCHARGE NOTE PROVIDER - ATTENDING DISCHARGE PHYSICAL EXAMINATION:
Physical exam:  GENERAL: NAD, frail appearing, lying in bed comfortably  HEAD:  Atraumatic, Normocephalic  EYES: EOMI, PERRLA, conjunctiva and sclera clear  ENT: Moist mucous membranes  NECK: Supple, No JVD  CHEST/LUNG: CTA   HEART: Regular rate and rhythm; S1+ S2+   ABDOMEN: Bowel sounds present; Soft, Nontender, Nondistended   EXTREMITIES:  2+ Peripheral Pulses, brisk capillary refill   NERVOUS SYSTEM:  Alert & Oriented , speech clear   : ruth in place   MSK: FROM all 4 extremities, full and equal strength  SKIN: No rashes or lesionsphy    87 yo M with PMHx lung SCC, BPH, peripheral neuropathy, anxiety px from home with 2-week hx of progressive weakness and dyspnea on exertion admitted for failure to thrive in setting of known advanced malignancy.      #Complicated effusion r/o empyema   #Squamous cell carcinoma of the lung –   Px with 2-week hx of progressively worsening weakness, dyspnea on exertion, fatigue, and decreased appetite. Currently, afebrile. HR . SBP 90-110s. RR 17-18. SpO2 97% on RA at rest.   on admission WBC 24.51. Neutrophil predominant. No bandemia. .2. Lactate 1.2.   CXR with RLL consolidation/pleural effusion with PleurX in place, similar to previous CXR in 1/2024.   Current px consistent with likely failure to thrive in setting of known advanced malignancy.   WBC lateral  in 20's   Pending chemotx/XRT outpatient infection resolves and improvement in functionality .   s/p tPA/dornase instilled through pleurx  Follows with Dr. Lawrence (Pulm) and Dr. Fletcher (Onc) outpatient.   started on bs abx with vanc/zosyn - now continued on zosyn - per pulm likely 2 weeks of pO abx   fu final pulm recs       #acute urinary retention 2/2 constipation   #nupur   cr uptrended after admission- 2/22  bladder scan reveals urinary retention 1.2 l pt with hx of BPH   continued on home meds   will be discharged with ruth and uro outpt follow up   cr now back to bs      FTT  palliative consulted - remains full code   PT  recommended [facility]- will be medically ready after final pulm recs  Physical exam:  GENERAL: NAD, frail appearing, lying in bed comfortably  HEAD:  Atraumatic, Normocephalic  EYES: EOMI, PERRLA, conjunctiva and sclera clear  ENT: Moist mucous membranes  NECK: Supple, No JVD  CHEST/LUNG: CTA   HEART: Regular rate and rhythm; S1+ S2+   ABDOMEN: Bowel sounds present; Soft, Nontender, Nondistended   EXTREMITIES:  2+ Peripheral Pulses, brisk capillary refill   NERVOUS SYSTEM:  Alert & Oriented , speech clear   : ruth in place   MSK: FROM all 4 extremities, full and equal strength  SKIN: No rashes or lesionsphy    89 yo M with PMHx lung SCC, BPH, peripheral neuropathy, anxiety px from home with 2-week hx of progressive weakness and dyspnea on exertion admitted for failure to thrive in setting of known advanced malignancy.      #Complicated effusion r/o empyema   #Sepsis 2/2 complicated effusion /empyema   #Squamous cell carcinoma of the lung –   Px with 2-week hx of progressively worsening weakness, dyspnea on exertion, fatigue, and decreased appetite. Currently, afebrile. HR . SBP 90-110s. RR 17-18. SpO2 97% on RA at rest.   on admission WBC 24.51. Neutrophil predominant. No bandemia. .2. Lactate 1.2.   CXR with RLL consolidation/pleural effusion with PleurX in place, similar to previous CXR in 1/2024.   Current px consistent with likely failure to thrive in setting of known advanced malignancy.   WBC lateral  in 20's   Pending chemotx/XRT outpatient infection resolves and improvement in functionality .   s/p tPA/dornase instilled through pleurx  Follows with Dr. Lawrence (Pulm) and Dr. Fletcher (Onc) outpatient.   started on bs abx with vanc/zosyn - now continued on zosyn - per pulm likely 2 weeks of pO abx   fu final pulm recs       #acute urinary retention 2/2 constipation   #nupur   cr uptrended after admission- 2/22  bladder scan reveals urinary retention 1.2 l pt with hx of BPH   continued on home meds   will be discharged with ruth and uro outpt follow up   cr now back to bs      FTT  palliative consulted - remains full code   PT  recommended [facility]- will be medically ready after final pulm recs  Physical exam:  GENERAL: NAD, frail appearing, lying in bed comfortably  HEAD:  Atraumatic, Normocephalic  EYES: EOMI, PERRLA, conjunctiva and sclera clear  ENT: Moist mucous membranes  NECK: Supple, No JVD  CHEST/LUNG: dec bs bilateral lower lobes   HEART: Regular rate and rhythm; S1+ S2+   ABDOMEN: Bowel sounds present; Soft, Nontender, Nondistended   EXTREMITIES:  2+ Peripheral Pulses, brisk capillary refill   NERVOUS SYSTEM:  Alert & Oriented , speech clear   : ruth in place   MSK: FROM all 4 extremities, full and equal strength  SKIN: No rashes or lesionsphy      87 yo M with PMHx lung SCC, BPH, peripheral neuropathy, anxiety px from home with 2-week hx of progressive weakness and dyspnea on exertion admitted for failure to thrive in setting of known advanced malignancy.      #Complicated effusion r/o empyema   #Squamous cell carcinoma of the lung –   Px with 2-week hx of progressively worsening weakness, dyspnea on exertion, fatigue, and decreased appetite. Currently, afebrile. HR . SBP 90-110s. RR 17-18. SpO2 97% on RA at rest.   on admission WBC 24.51. Neutrophil predominant. No bandemia. .2. Lactate 1.2.   CXR with RLL consolidation/pleural effusion with PleurX in place, similar to previous CXR in 1/2024.   Current px consistent with likely failure to thrive in setting of known advanced malignancy.   WBC trending in 20k's -- likely in the setting of malignancy   Pending chemotx/XRT outpatient.   s/p tPA/dornase instilled through pleurx  Follows with Dr. Lawrence (Pulm) and Dr. Fletcher (Onc) outpatient.   started on bs abx with vanc/zosyn - now continued on zosyn - per pulm likely 2 weeks of pO abx - will be discharged on augmentin   fu final pulm recs - PLERUX drainage per schedule       # acute urinary retention 2/2 constipation   #nupur   cr uptrended - 2/22  bladder scan reveals urinary retention 1.2 l pt with hx of BPH   continued on home meds   will be discharged with ruth and uro outpt   cr now back to bs   avoid nephro toxins         FTT  palliative consulted - remains full code   PT  recommended [facility].

## 2024-02-23 NOTE — PROGRESS NOTE ADULT - PROBLEM SELECTOR PLAN 5
In the setting of malignancy and to poor oral intake, lost 20 pounds in 2 weeks.  - On Diet, Regular, Ensure Enlive 2x/day (350kcal, 20g protein per serving).

## 2024-02-23 NOTE — PROGRESS NOTE ADULT - PROBLEM SELECTOR PLAN 7
Palliative care following for complex medical decision making in the setting of serious illness    On  discharge advise to follow up with supportive oncology with Dr. Knight. Patient has to call at [phone] to make an appointment.

## 2024-02-23 NOTE — OCCUPATIONAL THERAPY INITIAL EVALUATION ADULT - GENERAL OBSERVATIONS, REHAB EVAL
Pt received semi-supine in bed, +chest tube, +heplock, +ruth, in NAD and agreeable to OT. Cleared by SHAWN Schaefer and pulmonary MD Mendoza to see.

## 2024-02-23 NOTE — PHYSICAL THERAPY INITIAL EVALUATION ADULT - IMPAIRED TRANSFERS: SIT/STAND, REHAB EVAL
impaired balance/decreased flexibility/impaired postural control/decreased ROM/decreased strength Eucrisa Counseling: Patient may experience a mild burning sensation during topical application. Eucrisa is not approved in children less than 2 years of age.

## 2024-02-23 NOTE — PROGRESS NOTE ADULT - SUBJECTIVE AND OBJECTIVE BOX
PULMONARY CONSULT SERVICE FOLLOW-UP NOTE    INTERVAL HPI:  Reviewed chart and overnight events; patient seen and examined at bedside. Pt pleurx now draining freely, but only small amount of output into chest tube so far    MEDICATIONS:  Pulmonary:  dornase maria Solution for Pleural Effusion 5 milliGRAM(s) IntraPleural. every 12 hours    Antimicrobials:  piperacillin/tazobactam IVPB.. 4.5 Gram(s) IV Intermittent every 8 hours  vancomycin  IVPB 750 milliGRAM(s) IV Intermittent every 24 hours    Anticoagulants:  alteplase  Injectable for Pleural Effusion 10 milliGRAM(s) IntraPleural. every 12 hours  heparin   Injectable 5000 Unit(s) SubCutaneous every 8 hours    Cardiac:  doxazosin 4 milliGRAM(s) Oral at bedtime      Allergies    No Known Allergies    Intolerances        Vital Signs Last 24 Hrs  T(C): 36.7 (23 Feb 2024 12:19), Max: 37.2 (22 Feb 2024 21:37)  T(F): 98 (23 Feb 2024 12:19), Max: 99 (22 Feb 2024 21:37)  HR: 100 (23 Feb 2024 12:19) (74 - 100)  BP: 102/57 (23 Feb 2024 12:19) (99/50 - 117/57)  BP(mean): --  RR: 16 (23 Feb 2024 12:19) (16 - 17)  SpO2: 94% (23 Feb 2024 12:19) (92% - 99%)    Parameters below as of 23 Feb 2024 12:19  Patient On (Oxygen Delivery Method): room air        02-22 @ 07:01 - 02-23 @ 07:00  --------------------------------------------------------  IN: 840 mL / OUT: 2650 mL / NET: -1810 mL    02-23 @ 07:01  - 02-23 @ 14:37  --------------------------------------------------------  IN: 300 mL / OUT: 800 mL / NET: -500 mL          PHYSICAL EXAM:  Constitutional: cachectic  HEENT: NC/AT; PERRL, anicteric sclera; MMM  Neck: supple  Cardiovascular: +S1/S2, RRR  Respiratory: CTA B/L; no W/R/R  Gastrointestinal: soft, NT/ND  Extremities: WWP; no edema, clubbing or cyanosis  Vascular: 2+ radial pulses B/L  Neurological: awake and alert; SCHMIDT    LABS:      CBC Full  -  ( 23 Feb 2024 07:59 )  WBC Count : 23.09 K/uL  RBC Count : 3.43 M/uL  Hemoglobin : 8.7 g/dL  Hematocrit : 27.1 %  Platelet Count - Automated : 398 K/uL  Mean Cell Volume : 79.0 fl  Mean Cell Hemoglobin : 25.4 pg  Mean Cell Hemoglobin Concentration : 32.1 gm/dL  Auto Neutrophil # : 19.51 K/uL  Auto Lymphocyte # : 1.72 K/uL  Auto Monocyte # : 1.44 K/uL  Auto Eosinophil # : 0.22 K/uL  Auto Basophil # : 0.04 K/uL  Auto Neutrophil % : 84.5 %  Auto Lymphocyte % : 7.4 %  Auto Monocyte % : 6.2 %  Auto Eosinophil % : 1.0 %  Auto Basophil % : 0.2 %    02-23    137  |  102  |  20  ----------------------------<  95  3.2<L>   |  27  |  0.89    Ca    8.5      23 Feb 2024 07:59  Phos  2.7     02-23  Mg     1.9     02-23    TPro  4.8<L>  /  Alb  2.2<L>  /  TBili  0.4  /  DBili  x   /  AST  47<H>  /  ALT  41  /  AlkPhos  120  02-23          Urinalysis Basic - ( 23 Feb 2024 07:59 )    Color: x / Appearance: x / SG: x / pH: x  Gluc: 95 mg/dL / Ketone: x  / Bili: x / Urobili: x   Blood: x / Protein: x / Nitrite: x   Leuk Esterase: x / RBC: x / WBC x   Sq Epi: x / Non Sq Epi: x / Bacteria: x                RADIOLOGY & ADDITIONAL STUDIES:

## 2024-02-23 NOTE — DISCHARGE NOTE PROVIDER - HOSPITAL COURSE
#Discharge: do not delete    88M [facility] lung cancer with pleural effusions s/p Pleurx placement prior to this hospitalization, BPH, neuropathy, and anxiety presenting with progressive weakness, dyspnea on exertion, and inability to care for self at home. Admitted to [facility] for further evaluation and management of Failure to Thrive. Course c/b urinary retention needed ruth placement on 2/22. The patient is being followed by pulmonology for the Pleurx drainage, fluid studies were sent and there is concern for infection for which he was started on Vanc and Zosyn. Currently on TPA/Dornase MIST 2 protocol.       Problem List/Main Diagnoses:     #Failure to thrive in adult.   I/s/o multiple medical problems including known malignancy, chronic gait instability. Progressive weight loss since lung cancer diagnosis, however reports >20 pound weight loss over past 2 weeks. Lives alone and unable to cook for self as becomes fatigued and has unstable gait.   Plan:  - regular diet with supplements and vitamins   - PT evaluated and recommended [facility]   - palliative on board, f/u recs.    #SIRS (systemic inflammatory response syndrome).  #Pleural effusion.   Patient meeting SIRS criteria (HR >90 and Leukocytosis. His leukocytosis is worsening from admission. No fevers recorded. No clear infectious source at this time and is low on differential at this time. Could be I/s/o infected pulmonary effusion. CXR this admission with redemonstrated pleural effusion. Follows with Dr Lawrence outpatient. Currently comfortable on RA, however pt reports dyspnea on exertion and discomfort at pleurx site.   Plan:  - Started on Vanc/Zosyn   - f/u fluid studies from Pleurx   - pending pleural fluid analysis   - Drain Plurx as scheduled, TPA/Dornase MIST 2 protocol.    - Pending Palliative conversation for further care.      #Urinary retention.   Patient with Hx of BPH on medications outpatient. BS today showed 1.7L of urine in bladder. Trial of straight cath was unsuccessful therefore urology was called for Ruth placement. 1.5L was removed from bladder.   Urine output appropriate after the large amount of urine extracted during ruth placement. s/p IV fluids for post-obstruction diuresis. .  Plan:  - watch for post-obstructive diuresis   - Continue Ruth for at least 3 days per urology recs and TOV afterwards.  - Recommend outpatient follow up    #Squamous cell lung cancer.   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 Follows with Dr Lawrence and Dr Fletcher (oncologist). s/p bronchoscopy, pleurx placement, and thoracentesis outpatient. EBUS FNA bx and BAL 11/2023 +malignant cells. RLL bx +SCC. Pending radiation treatment, last seen 12/2023 and was determined to be a good candidate for chemoradiation at that time however did not receive tx due to large pleural effusion. Now p/w FTT, progressive dyspnea, fatigue, weight loss, night sweats.  Plan:  - palliative consulted to aid in decision about possible chemo/radiation therapy  - Pulm on board as well, pleural fluid analysis sent, pending results  - encourage PO intake.    #Constipation (IMPROVING)  The patient was complaining of constipation overnight. Started on Miralax, however, per nursing, the patient was given senna and had a large bowel movement, therefore, Miralax was held. He is complaining of abdominal discomfort at this time, improved from admission. Abdominal xray with stool burden.  Plan:  - c/w senna at night, patient refused overnight and simethicone was given  - Miralax PRN.    #Protein calorie malnutrition.   ·  Plan: BMI 18, >20 pound weight loss over past 2 weeks.  - nutrition consulted and recommended regular diet with Ensure Enlive twice per day.    #Normocytic anemia.   ·  Plan: Hgb on presentation 9.8, last hgb 11.7 1/2024. Pt denies hematuria, hematemesis, hemoptysis, or melena. +constipation. UA +RBC and large blood. Likely AOCD and nutritional deficiencies i/s/o progressing lung cancer and significant weight loss.  Iron studies noted  Plan:  - maintain active T&S    #Advanced Care Planning       New medications/therapies:   New lines/hardware:  Labs to be followed outpatient:   Exam to be followed outpatient:     Discharge plan: discharge to ______    Physical Exam Upon Discharge:     88M PMH lung cancer with pleural effusions s/p Pleurx placement prior to this hospitalization, BPH, neuropathy, and anxiety presenting with progressive weakness, dyspnea on exertion, and inability to care for self at home. Admitted to [facility] for further evaluation and management of Failure to Thrive. Course c/b urinary retention needed ruth placement on 2/22. The patient is being followed by pulmonology for the Pleurx drainage, fluid studies were sent and there is concern for infection for which he was started on Vanc and Zosyn. Currently on TPA/Dornase MIST 2 protocol.     Problem List/Main Diagnoses:     #Failure to thrive in adult.   I/s/o multiple medical problems including known malignancy, chronic gait instability. Progressive weight loss since lung cancer diagnosis, however reports >20 pound weight loss over past 2 weeks. Lives alone and unable to cook for self as becomes fatigued and has unstable gait.   Plan:  - regular diet with supplements and vitamins   - PT evaluated and recommended [facility]   - palliative follow up with Dr Rodriguez at scheduled appointment.     #SIRS (systemic inflammatory response syndrome)  #Pleural effusion.   Patient meeting SIRS criteria (HR >90 and Leukocytosis. His leukocytosis is worsening from admission. No fevers recorded. No clear infectious source at this time and is low on differential at this time. Could be I/s/o infected pulmonary effusion. CXR this admission with redemonstrated pleural effusion. Follows with Dr Lawrence outpatient. Currently comfortable on RA, however pt reports dyspnea on exertion and discomfort at pleurx site.   Plan:  - Started on Vanc/Zosyn   - f/u fluid studies from Pleurx   - pending pleural fluid analysis   - Drain Plurx as scheduled, TPA/Dornase MIST 2 protocol.    - Pending Palliative conversation for further care.    #Urinary retention.   Patient with Hx of BPH on medications outpatient. BS today showed 1.7L of urine in bladder. Trial of straight cath was unsuccessful therefore urology was called for Ruth placement. 1.5L was removed from bladder. Urine output appropriate after the large amount of urine extracted during ruth placement. s/p IV fluids for post-obstruction diuresis. .  Plan:  - watch for post-obstructive diuresis   - Continue Ruth for at least 3 days per urology recs and TOV afterwards.  - Recommend TOV at [facility]     Ruth Care Instructions:  To take care of your Ruth catheter, you will need to:    - Clean your catheter every day.  - Change your drainage bags. You will change your drainage bag 2 times a day:  - In the morning, change the night bag to the leg bag.  - At night before you go to bed, change the leg bag to the night bag.  - Replace your drainage bags with new bags once a week. You should also change your drainage bag if it gets clogged or blocked.  - Wash your drainage bags every day.  - Drink 1 to 2 glasses of liquids every 2 hours while you’re awake to keep you hydrated.    You may see some blood or urine around where the catheter enters your body. This may happen when you’re walking or having a bowel movement (pooping). This is normal if there’s urine draining into the drainage bag. If you do not have urine draining into the drainage bag, call your healthcare provider.    #Squamous cell lung cancer.   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 Follows with Dr Lawrence and Dr Fletcher (oncologist). s/p bronchoscopy, pleurx placement, and thoracentesis outpatient. EBUS FNA bx and BAL 11/2023 +malignant cells. RLL bx +SCC. Pending radiation treatment, last seen 12/2023 and was determined to be a good candidate for chemoradiation at that time however did not receive tx due to large pleural effusion. Now p/w FTT, progressive dyspnea, fatigue, weight loss, night sweats.  Plan:  - palliative consulted to aid in decision about possible chemo/radiation therapy  - Pulm on board as well, pleural fluid analysis sent, pending results  - encourage PO intake.    #Constipation (IMPROVING)  The patient was complaining of constipation overnight. Started on Miralax, however, per nursing, the patient was given senna and had a large bowel movement, therefore, Miralax was held. He is complaining of abdominal discomfort at this time, improved from admission. Abdominal xray with stool burden.  Plan:  - c/w senna at night, patient refused overnight and simethicone was given  - Miralax PRN.    #Protein calorie malnutrition.   ·  Plan: BMI 18, >20 pound weight loss over past 2 weeks.  - nutrition consulted and recommended regular diet with Ensure Enlive twice per day.    #Normocytic anemia.   ·  Plan: Hgb on presentation 9.8, last hgb 11.7 1/2024. Pt denies hematuria, hematemesis, hemoptysis, or melena. +constipation. UA +RBC and large blood. Likely AOCD and nutritional deficiencies i/s/o progressing lung cancer and significant weight loss.  Iron studies noted  Plan:  - maintain active T&S    #Advanced Care Planning   Palliative Care Team was following for support and aid in complex medical decision making.   Plan:  - advise to follow up with supportive oncology with Dr. Knight. Patient has to call at [phone] to make an appointment.    New medications/therapies:   New lines/hardware: Ruth Catheter  Labs to be followed outpatient: CBC, CMP  Discharge plan: discharge to [facility]    Physical Exam Upon Discharge:  General: cachectic appearing older gentleman, NAD, laying in bed, speaking in full sentences  HEENT: no conjunctival injection, EOMI, MMM  Neck: supple, no JVD  Cardio: RRR, +S1/S2, no M/R/G  Resp: lungs CTAB, no cough/wheezes/rales/rhonchi  Abdo: soft, NT, ND, +bowel sounds x4, no organomegaly or palpable mass    Extremities: WWP, no edema/cyanosis/clubbing   Vasc: 2+ radial and DP pulses b/l  Neuro: A&Ox3  Skin: dry, intact, no visible jaundice   MSK: no joint swelling       88M PMH lung cancer with pleural effusions s/p Pleurx placement prior to this hospitalization, BPH, neuropathy, and anxiety presenting with progressive weakness, dyspnea on exertion, and inability to care for self at home. Admitted to [facility] for further evaluation and management of Failure to Thrive. Course c/b urinary retention needed ruth placement on 2/22. The patient is being followed by pulmonology for the Pleurx drainage, fluid studies were sent and there is concern for infection for which he was started on Vanc and Zosyn. Currently on TPA/Dornase MIST 2 protocol.     Problem List/Main Diagnoses:     #Failure to thrive in adult.   I/s/o multiple medical problems including known malignancy, chronic gait instability. Progressive weight loss since lung cancer diagnosis, however reports >20 pound weight loss over past 2 weeks. Lives alone and unable to cook for self as becomes fatigued and has unstable gait.   Plan:  - regular diet with supplements and vitamins   - PT evaluated and recommended [facility]   - palliative follow up with Dr Rodriguez at scheduled appointment.     #sepsis 2/2 complicated effusion /empyema   #Pleural effusion.   Patient meeting SIRS criteria (HR >90 and Leukocytosis. His leukocytosis is worsening from admission. No fevers recorded. No clear infectious source at this time and is low on differential at this time. Could be I/s/o infected pulmonary effusion. CXR this admission with redemonstrated pleural effusion. Follows with Dr Lawrence outpatient. Currently comfortable on RA, however pt reports dyspnea on exertion and discomfort at pleurx site.   Plan:  - Started on Vanc/Zosyn   - f/u fluid studies from Pleurx   - pending pleural fluid analysis   - Drain Plurx as scheduled, TPA/Dornase MIST 2 protocol.    - Pending Palliative conversation for further care.    #Urinary retention.   Patient with Hx of BPH on medications outpatient. BS today showed 1.7L of urine in bladder. Trial of straight cath was unsuccessful therefore urology was called for Ruth placement. 1.5L was removed from bladder. Urine output appropriate after the large amount of urine extracted during ruth placement. s/p IV fluids for post-obstruction diuresis. .  Plan:  - watch for post-obstructive diuresis   - Continue Ruth for at least 3 days per urology recs and TOV afterwards.  - Recommend TOV at [facility]     Ruth Care Instructions:  To take care of your Ruth catheter, you will need to:    - Clean your catheter every day.  - Change your drainage bags. You will change your drainage bag 2 times a day:  - In the morning, change the night bag to the leg bag.  - At night before you go to bed, change the leg bag to the night bag.  - Replace your drainage bags with new bags once a week. You should also change your drainage bag if it gets clogged or blocked.  - Wash your drainage bags every day.  - Drink 1 to 2 glasses of liquids every 2 hours while you’re awake to keep you hydrated.    You may see some blood or urine around where the catheter enters your body. This may happen when you’re walking or having a bowel movement (pooping). This is normal if there’s urine draining into the drainage bag. If you do not have urine draining into the drainage bag, call your healthcare provider.    #Squamous cell lung cancer.   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 Follows with Dr Lawrence and Dr Fletcher (oncologist). s/p bronchoscopy, pleurx placement, and thoracentesis outpatient. EBUS FNA bx and BAL 11/2023 +malignant cells. RLL bx +SCC. Pending radiation treatment, last seen 12/2023 and was determined to be a good candidate for chemoradiation at that time however did not receive tx due to large pleural effusion. Now p/w FTT, progressive dyspnea, fatigue, weight loss, night sweats.  Plan:  - palliative consulted to aid in decision about possible chemo/radiation therapy  - Pulm on board as well, pleural fluid analysis sent, pending results  - encourage PO intake.    #Constipation (IMPROVING)  The patient was complaining of constipation overnight. Started on Miralax, however, per nursing, the patient was given senna and had a large bowel movement, therefore, Miralax was held. He is complaining of abdominal discomfort at this time, improved from admission. Abdominal xray with stool burden.  Plan:  - c/w senna at night, patient refused overnight and simethicone was given  - Miralax PRN.    #Protein calorie malnutrition.   ·  Plan: BMI 18, >20 pound weight loss over past 2 weeks.  - nutrition consulted and recommended regular diet with Ensure Enlive twice per day.    #Normocytic anemia.   ·  Plan: Hgb on presentation 9.8, last hgb 11.7 1/2024. Pt denies hematuria, hematemesis, hemoptysis, or melena. +constipation. UA +RBC and large blood. Likely AOCD and nutritional deficiencies i/s/o progressing lung cancer and significant weight loss.  Iron studies noted  Plan:  - maintain active T&S    #Advanced Care Planning   Palliative Care Team was following for support and aid in complex medical decision making.   Plan:  - advise to follow up with supportive oncology with Dr. Knight. Patient has to call at [phone] to make an appointment.    New medications/therapies:   New lines/hardware: Ruth Catheter  Labs to be followed outpatient: CBC, CMP  Discharge plan: discharge to [facility]    Physical Exam Upon Discharge:  General: cachectic appearing older gentleman, NAD, laying in bed, speaking in full sentences  HEENT: no conjunctival injection, EOMI, MMM  Neck: supple, no JVD  Cardio: RRR, +S1/S2, no M/R/G  Resp: lungs CTAB, no cough/wheezes/rales/rhonchi  Abdo: soft, NT, ND, +bowel sounds x4, no organomegaly or palpable mass    Extremities: WWP, no edema/cyanosis/clubbing   Vasc: 2+ radial and DP pulses b/l  Neuro: A&Ox3  Skin: dry, intact, no visible jaundice   MSK: no joint swelling       88M PMH lung cancer with pleural effusions s/p Pleurx placement prior to this hospitalization, BPH, neuropathy, and anxiety presenting with progressive weakness, dyspnea on exertion, and inability to care for self at home. Admitted to [facility] for further evaluation and management of Failure to Thrive. Course c/b urinary retention needed ruth placement on 2/22. The patient is being followed by pulmonology for the Pleurx drainage, fluid studies were sent and there is concern for infection for which he was started on Vanc and Zosyn. Currently on TPA/Dornase MIST 2 protocol.     Problem List/Main Diagnoses:     #Failure to thrive in adult.   I/s/o multiple medical problems including known malignancy, chronic gait instability. Progressive weight loss since lung cancer diagnosis, however reports >20 pound weight loss over past 2 weeks. Lives alone and unable to cook for self as becomes fatigued and has unstable gait.   Plan:  - regular diet with supplements and vitamins   - PT evaluated and recommended [facility]   - palliative follow up with Dr Rodriguez at scheduled appointment.     #sepsis 2/2 complicated effusion /empyema   #Pleural effusion.   Patient meeting SIRS criteria (HR >90 and Leukocytosis. His leukocytosis is worsening from admission. No fevers recorded. No clear infectious source at this time and is low on differential at this time. Could be I/s/o infected pulmonary effusion. CXR this admission with redemonstrated pleural effusion. Follows with Dr Lawrence outpatient. Currently comfortable on RA, however pt reports dyspnea on exertion and discomfort at pleurx site. s/p Drain Plurx as scheduled, TPA/Dornase MIST 2 protocol.    Plan:  - Started on Vanc/Zosyn, will discharge on augmentin 500mg BID   - f/u fluid studies from Pleurx   - pending pleural fluid analysis   - Recommendation to drain Pleurx weekly   - Pending Palliative conversation for further care.    #Urinary retention.   Patient with Hx of BPH on medications outpatient. BS today showed 1.7L of urine in bladder. Trial of straight cath was unsuccessful therefore urology was called for Ruth placement. 1.5L was removed from bladder. Urine output appropriate after the large amount of urine extracted during ruth placement. s/p IV fluids for post-obstruction diuresis. .  Plan:  - watch for post-obstructive diuresis   - Continue Ruth for at least 3 days per urology recs and TOV afterwards.  - Recommend TOV at [facility] Care Instructions:  To take care of your Ruth catheter, you will need to:    - Clean your catheter every day.  - Change your drainage bags. You will change your drainage bag 2 times a day:  - In the morning, change the night bag to the leg bag.  - At night before you go to bed, change the leg bag to the night bag.  - Replace your drainage bags with new bags once a week. You should also change your drainage bag if it gets clogged or blocked.  - Wash your drainage bags every day.  - Drink 1 to 2 glasses of liquids every 2 hours while you’re awake to keep you hydrated.    You may see some blood or urine around where the catheter enters your body. This may happen when you’re walking or having a bowel movement (pooping). This is normal if there’s urine draining into the drainage bag. If you do not have urine draining into the drainage bag, call your healthcare provider.    #Squamous cell lung cancer.   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 Follows with Dr Lawrence and Dr Fletcher (oncologist). s/p bronchoscopy, pleurx placement, and thoracentesis outpatient. EBUS FNA bx and BAL 11/2023 +malignant cells. RLL bx +SCC. Pending radiation treatment, last seen 12/2023 and was determined to be a good candidate for chemoradiation at that time however did not receive tx due to large pleural effusion. Now p/w FTT, progressive dyspnea, fatigue, weight loss, night sweats.  Plan:  - palliative consulted to aid in decision about possible chemo/radiation therapy  - Pulm on board as well, pleural fluid analysis sent, pending results  - encourage PO intake.    #Constipation (IMPROVING)  The patient was complaining of constipation overnight. Started on Miralax, however, per nursing, the patient was given senna and had a large bowel movement, therefore, Miralax was held. He is complaining of abdominal discomfort at this time, improved from admission. Abdominal xray with stool burden.  Plan:  - c/w senna at night, patient refused overnight and simethicone was given  - Miralax PRN.    #Protein calorie malnutrition.   ·  Plan: BMI 18, >20 pound weight loss over past 2 weeks.  - nutrition consulted and recommended regular diet with Ensure Enlive twice per day.    #Normocytic anemia.   ·  Plan: Hgb on presentation 9.8, last hgb 11.7 1/2024. Pt denies hematuria, hematemesis, hemoptysis, or melena. +constipation. UA +RBC and large blood. Likely AOCD and nutritional deficiencies i/s/o progressing lung cancer and significant weight loss.  Iron studies noted  Plan:  - maintain active T&S    #Advanced Care Planning   Palliative Care Team was following for support and aid in complex medical decision making.   Plan:  - advise to follow up with supportive oncology with Dr. Knight. Patient has to call at [phone] to make an appointment.    New medications/therapies:   New lines/hardware: Ruth Catheter  Labs to be followed outpatient: CBC, CMP  Discharge plan: discharge to [facility]    Physical Exam Upon Discharge:  General: cachectic appearing older gentleman, NAD, laying in bed, speaking in full sentences  HEENT: no conjunctival injection, EOMI, MMM  Neck: supple, no JVD  Cardio: RRR, +S1/S2, no M/R/G  Resp: lungs CTAB, no cough/wheezes/rales/rhonchi  Abdo: soft, NT, ND, +bowel sounds x4, no organomegaly or palpable mass    Extremities: WWP, no edema/cyanosis/clubbing   Vasc: 2+ radial and DP pulses b/l  Neuro: A&Ox3  Skin: dry, intact, no visible jaundice   MSK: no joint swelling       88M PMH lung cancer with pleural effusions s/p Pleurx placement prior to this hospitalization, BPH, neuropathy, and anxiety presenting with progressive weakness, dyspnea on exertion, and inability to care for self at home. Admitted to [facility] for further evaluation and management of Failure to Thrive. Course c/b urinary retention needed ruth placement on 2/22. The patient is being followed by pulmonology for the Pleurx drainage, fluid studies were sent and there is concern for infection for which he was started on Vanc and Zosyn. Currently on TPA/Dornase MIST 2 protocol.     Problem List/Main Diagnoses:     #Failure to thrive in adult.   I/s/o multiple medical problems including known malignancy, chronic gait instability. Progressive weight loss since lung cancer diagnosis, however reports >20 pound weight loss over past 2 weeks. Lives alone and unable to cook for self as becomes fatigued and has unstable gait.   Plan:  - regular diet with supplements and vitamins   - PT evaluated and recommended [facility]   - palliative follow up with Dr Rodriguez at scheduled appointment.     #sepsis 2/2 complicated effusion /empyema   #Pleural effusion.   Patient meeting SIRS criteria (HR >90 and Leukocytosis. His leukocytosis is worsening from admission. No fevers recorded. No clear infectious source at this time and is low on differential at this time. Could be I/s/o infected pulmonary effusion. CXR this admission with redemonstrated pleural effusion. Follows with Dr Lawrence outpatient. Currently comfortable on RA, however pt reports dyspnea on exertion and discomfort at pleurx site. s/p Drain Plurx as scheduled, TPA/Dornase MIST 2 protocol.    Plan:  - Started on Vanc/Zosyn, will discharge on augmentin 500mg BID   - f/u fluid studies from Pleurx   - pending pleural fluid analysis   - Recommendation to drain Pleurx weekly   - Pending Palliative conversation for further care.    #Urinary retention.   Patient with Hx of BPH on medications outpatient. BS today showed 1.7L of urine in bladder. Trial of straight cath was unsuccessful therefore urology was called for Ruth placement. 1.5L was removed from bladder. Urine output appropriate after the large amount of urine extracted during ruth placement. s/p IV fluids for post-obstruction diuresis. .  Plan:  - watch for post-obstructive diuresis   - Continue Ruth for at least 3 days per urology recs and TOV afterwards.  - Recommend TOV at [facility] Care Instructions:  To take care of your Ruth catheter, you will need to:    - Clean your catheter every day.  - Change your drainage bags. You will change your drainage bag 2 times a day:  - In the morning, change the night bag to the leg bag.  - At night before you go to bed, change the leg bag to the night bag.  - Replace your drainage bags with new bags once a week. You should also change your drainage bag if it gets clogged or blocked.  - Wash your drainage bags every day.  - Drink 1 to 2 glasses of liquids every 2 hours while you’re awake to keep you hydrated.    You may see some blood or urine around where the catheter enters your body. This may happen when you’re walking or having a bowel movement (pooping). This is normal if there’s urine draining into the drainage bag. If you do not have urine draining into the drainage bag, call your healthcare provider.    #Squamous cell lung cancer.   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 Follows with Dr Lawrence and Dr Fletcher (oncologist). s/p bronchoscopy, pleurx placement, and thoracentesis outpatient. EBUS FNA bx and BAL 11/2023 +malignant cells. RLL bx +SCC. Pending radiation treatment, last seen 12/2023 and was determined to be a good candidate for chemoradiation at that time however did not receive tx due to large pleural effusion. Now p/w FTT, progressive dyspnea, fatigue, weight loss, night sweats.  Plan:  - palliative consulted to aid in decision about possible chemo/radiation therapy  - Pulm on board as well, pleural fluid analysis sent, pending results  - encourage PO intake.    #Constipation (IMPROVING)  #Diarrhea  The patient was complaining of constipation overnight. Started on Miralax, however, per nursing, the patient was given senna and had a large bowel movement, therefore, Miralax was held. He is complaining of abdominal discomfort at this time, improved from admission. Abdominal xray with stool burden.  On 2/29, the patient reported multiple episodes of diarrhea for which all the laxatives were discontinued. Denies any blood, pain or bloating.   Plan:  - Stopped senna and Miralax PRN given diarrhea.    #Protein calorie malnutrition.   ·  Plan: BMI 18, >20 pound weight loss over past 2 weeks.  - nutrition consulted and recommended regular diet with Ensure Enlive twice per day.    #Normocytic anemia.   ·  Plan: Hgb on presentation 9.8, last hgb 11.7 1/2024. Pt denies hematuria, hematemesis, hemoptysis, or melena. +constipation. UA +RBC and large blood. Likely AOCD and nutritional deficiencies i/s/o progressing lung cancer and significant weight loss.  Iron studies noted  Plan:  - maintain active T&S    #Advanced Care Planning   Palliative Care Team was following for support and aid in complex medical decision making.   Plan:  - advise to follow up with supportive oncology with Dr. Knight. Patient has to call at [phone] to make an appointment.    New medications/therapies:   New lines/hardware: Ruth Catheter  Labs to be followed outpatient: CBC, CMP  Discharge plan: discharge to [facility]    Physical Exam Upon Discharge:  General: cachectic appearing older gentleman, NAD, laying in bed, speaking in full sentences  HEENT: no conjunctival injection, EOMI, MMM  Neck: supple, no JVD  Cardio: RRR, +S1/S2, no M/R/G  Resp: lungs CTAB, no cough/wheezes/rales/rhonchi  Abdo: soft, NT, ND, +bowel sounds x4, no organomegaly or palpable mass    Extremities: WWP, no edema/cyanosis/clubbing   Vasc: 2+ radial and DP pulses b/l  Neuro: A&Ox3  Skin: dry, intact, no visible jaundice   MSK: no joint swelling

## 2024-02-23 NOTE — OCCUPATIONAL THERAPY INITIAL EVALUATION ADULT - DIAGNOSIS, OT EVAL
Pt admitted for FTT with course c/b urinary retention presents with impaired balance, generalized deconditioning, and decreased activity tolerance impacting overall ease of completing ADLs and functional mobility tasks at [facility].

## 2024-02-23 NOTE — DISCHARGE NOTE PROVIDER - CARE PROVIDER_API CALL
Chirag Hernandez  Cardiovascular Disease  [address] [address]  Phone: [phone]  Fax: [phone]  Follow Up Time:     Barbara Rodriguez  Hospice/Palliative Medicine  [address] [address]  Phone: [phone]  Fax: [phone]  Scheduled Appointment: 03/04/2024 01:30 PM    Giles Lawrence  Critical Care Medicine  [address], [address] [place] [id number]  Phone: [phone]  Fax: [phone]  Scheduled Appointment: 04/08/2024 04:00 PM

## 2024-02-23 NOTE — PROGRESS NOTE ADULT - SUBJECTIVE AND OBJECTIVE BOX
SUBJECTIVE AND OBJECTIVE:    Indication for Geriatrics and Palliative Care Services/INTERVAL HPI: 88M PMH lung cancer, BPH, neuropathy, and anxiety presenting with progressive weakness, dyspnea on exertion, and inability to care for self at home. Admitted to [facility] for further evaluation and management. Palliative care consulted for complex medical decision making in the setting of serious illness    OVERNIGHT EVENTS: Patient seen and examined at bedside.  Patient denies acute symptoms, agree to work with PT this afternoon. Good appetite and tolerating well diet.        DNR on chart: No   Allergies    No Known Allergies  Intolerances    MEDICATIONS  (STANDING):  alteplase  Injectable for Pleural Effusion 10 milliGRAM(s) IntraPleural. every 12 hours  atorvastatin 10 milliGRAM(s) Oral at bedtime  dornase maria Solution for Pleural Effusion 5 milliGRAM(s) IntraPleural. every 12 hours  doxazosin 4 milliGRAM(s) Oral at bedtime  finasteride 5 milliGRAM(s) Oral daily  heparin   Injectable 5000 Unit(s) SubCutaneous every 8 hours  piperacillin/tazobactam IVPB.- 4.5 Gram(s) IV Intermittent once  piperacillin/tazobactam IVPB.- 4.5 Gram(s) IV Intermittent once  polyethylene glycol 3350 17 Gram(s) Oral daily  senna 2 Tablet(s) Oral at bedtime  vancomycin  IVPB 1000 milliGRAM(s) IV Intermittent every 12 hours    MEDICATIONS  (PRN):  acetaminophen     Tablet .. 650 milliGRAM(s) Oral every 6 hours PRN Temp greater or equal to 38C (100.4F), Mild Pain (1 - 3)  melatonin 3 milliGRAM(s) Oral at bedtime PRN Sleep      ITEMS UNCHECKED ARE NOT PRESENT    PRESENT SYMPTOMS: [ ]Unable to self-report - see  CPOT, PAINADS, RDOS below  Source if other than patient:  [ ]Family   [ ]Team     Pain:  [ ]yes [ x]no  QOL impact -   Location -                    Aggravating factors -  Quality -  Radiation -  Timing-  Severity (0-10 scale):  Minimal acceptable level (0-10 scale):     Dyspnea:                           [ ]Mild [ ]Moderate [ ]Severe  Anxiety:                             [ ]Mild [ ]Moderate [ ]Severe  Fatigue:                             [ ]Mild [x ]Moderate [ ]Severe  Nausea:                             [ ]Mild [ ]Moderate [ ]Severe  Loss of appetite:              [ ]Mild [ ]Moderate [ ]Severe  Constipation:                    [ ]Mild [ ]Moderate [ ]Severe    PCSSQ[Palliative Care Spiritual Screening Question]   Severity (0-10):  Score of 4 or > indicate consideration of Chaplaincy referral.  Chaplaincy Referral: [ ] yes [ ] refused [ ] following    Caregiver Lenore? : [ ] yes [ ] no  [ ] deferred:  Social work referral [ ] Patient & Family Centered Care Referral [ ]     Anticipatory Grief present?:  [ ] yes [ ] no  [ ] deferred: Social work referral [ ] Patient & Family Centered Care Referral [ ]      Other Symptoms:  [x]All other review of systems negative     PHYSICAL EXAM:  Vital Signs Last 24 Hrs  T(C): 36.8 (23 Feb 2024 06:14), Max: 37.2 (22 Feb 2024 21:37)  T(F): 98.2 (23 Feb 2024 06:14), Max: 99 (22 Feb 2024 21:37)  HR: 88 (23 Feb 2024 06:14) (65 - 88)  BP: 117/57 (23 Feb 2024 06:14) (97/50 - 117/57)  BP(mean): --  RR: 17 (23 Feb 2024 06:14) (16 - 17)  SpO2: 92% (23 Feb 2024 06:14) (92% - 99%)    Parameters below as of 23 Feb 2024 06:14  Patient On (Oxygen Delivery Method): room air     I&O's Summary    22 Feb 2024 07:01  -  23 Feb 2024 07:00  --------------------------------------------------------  IN: 840 mL / OUT: 2650 mL / NET: -1810 mL    23 Feb 2024 07:01  -  23 Feb 2024 12:20  --------------------------------------------------------  IN: 300 mL / OUT: 800 mL / NET: -500 mL       GENERAL: [ ]Cachexia    [x ]Alert  [ x]Oriented x 2 person and place  [ ]Lethargic  [ ]Unarousable  [x ]Verbal  [ ]Non-Verbal  Behavioral:   [ ]Anxiety  [ ]Delirium [ ]Agitation [x ]Other  HEENT:  [x ]Normal   [ ]Dry mouth   [ ]ET Tube/Trach  [ ]Oral lesions  PULMONARY:   [ ]Clear [ ]Tachypnea  [ ]Audible excessive secretions   [ ]Rhonchi        [ ]Right [ ]Left [ ]Bilateral  [ ]Crackles        [ ]Right [ ]Left [ ]Bilateral  [ ]Wheezing     [ ]Right [ ]Left [ ]Bilateral  [x ]Diminished BS [ ] Right [ ]Left [x ]Bilateral  CARDIOVASCULAR:    [x ]Regular [ ]Irregular [ ]Tachy  [ ]Brandin [ ]Murmur [ ]Other  GASTROINTESTINAL:  [x ]Soft  [ ]Distended   [ x]+BS  [x ]Non tender [ ]Tender  [ ]Other [ ]PEG [ ]OGT/ NGT   Last BM: 02/22  GENITOURINARY:  [ ]Normal [ ]Incontinent   [ ]Oliguria/Anuria   [x ]Gerber  MUSCULOSKELETAL:   [ ]Normal   [x ]Weakness  [ ]Bed/Wheelchair bound [ ]Edema  NEUROLOGIC:   [ ]No focal deficits  [ ] Cognitive impairment  [ ] Dysphagia [ ]Dysarthria [ ] Paresis [ ]Other   SKIN: ecchymotic, please see RN assessment  [ ]Normal  [ ]Rash  [ ]Other  [ ]Pressure ulcer(s) [ ]y [ ]n present on admission    CRITICAL CARE:  [ ]Shock Present  [ ]Septic [ ]Cardiogenic [ ]Neurologic [ ]Hypovolemic  [ ]Vasopressors [ ]Inotropes  [ ]Respiratory failure present [ ]Mechanical Ventilation [ ]Non-invasive ventilatory support [ ]High-Flow   [ ]Acute  [ ]Chronic [ ]Hypoxic  [ ]Hypercarbic [ ]Other  [ ]Other organ failure     LABS:                        8.7    23.09 )-----------( 398      ( 23 Feb 2024 07:59 )             27.1   02-23    137  |  102  |  20  ----------------------------<  95  3.2<L>   |  27  |  0.89    Ca    8.5      23 Feb 2024 07:59  Phos  2.7     02-23  Mg     1.9     02-23    TPro  4.8<L>  /  Alb  2.2<L>  /  TBili  0.4  /  DBili  x   /  AST  47<H>  /  ALT  41  /  AlkPhos  120  02-23      Urinalysis Basic - ( 23 Feb 2024 07:59 )    Color: x / Appearance: x / SG: x / pH: x  Gluc: 95 mg/dL / Ketone: x  / Bili: x / Urobili: x   Blood: x / Protein: x / Nitrite: x   Leuk Esterase: x / RBC: x / WBC x   Sq Epi: x / Non Sq Epi: x / Bacteria: x      RADIOLOGY & ADDITIONAL STUDIES: none new     Protein Calorie Malnutrition Present: [ ]mild [ ]moderate [ x]severe [ ]underweight [ ]morbid obesity  https://www.andeal.org/vault/2440/web/files/ONC/Table_Clinical%20Characteristics%20to%20Document%20Malnutrition-White%20JV%20et%20al%202012.pdf    Height (cm): 170.2 (02-20-24 @ 14:59)  Weight (kg): 52.2 (02-20-24 @ 14:59)  BMI (kg/m2): 18 (02-20-24 @ 14:59)    [ ]PPSV2 < or = 30%  [ ]significant weight loss [ ]poor nutritional intake [ ]anasarca[ ]Artificial Nutrition    Other REFERRALS:  [ ]Hospice  [ ]Child Life  [ ]Social Work  [ ]Case management [ ]Holistic Therapy     Palliative Performance Scale:  http://npcrc.org/files/news/palliative_performance_scale_ppsv2.pdf  (Ctrl +  left click to view)  Respiratory Distress Observation Tool:  https://homecareinformation.net/handouts/hen/Respiratory_Distress_Observation_Scale.pdf (Ctrl +  left click to view)  PAINAD Score:  http://geriatrictoolkit.[place].[facility]/cog/painad.pdf (Ctrl +  left click to view)

## 2024-02-23 NOTE — OCCUPATIONAL THERAPY INITIAL EVALUATION ADULT - ADDITIONAL COMMENTS
Pt lives alone in an apartment with elevator access, +3STE. Prior to admit, pt reports requiring some assist with ADLs, has HHA (unable to recall frequency). Pt states that he uses a RW at baseline.

## 2024-02-23 NOTE — DISCHARGE NOTE PROVIDER - CARE PROVIDERS DIRECT ADDRESSES
,[email or web address]

## 2024-02-23 NOTE — PROGRESS NOTE ADULT - PROBLEM SELECTOR PLAN 2
Patient meeting SIRS criteria (HR >90 and Leukocytosis. His leukocytosis is worsening from admission. No fevers recorded. No clear infectious source at this time and is low on differential at this time.   Plan:  - Will continue to watch WBC and keep him off antibiotics at this time given no clear source of infection

## 2024-02-23 NOTE — PROGRESS NOTE ADULT - PROBLEM SELECTOR PROBLEM 7
Palliative care encounter Minoxidil Counseling: Minoxidil is a topical medication which can increase blood flow where it is applied. It is uncertain how this medication increases hair growth. Side effects are uncommon and include stinging and allergic reactions.

## 2024-02-23 NOTE — PROGRESS NOTE ADULT - PROBLEM SELECTOR PLAN 3
Likely reactive, hemoconcentration. Low suspicious of acute infectious process.   - WBC down trending, afebrile   - Monitoring off antibiotics

## 2024-02-23 NOTE — PROGRESS NOTE ADULT - PROBLEM SELECTOR PLAN 8
BMI 18, >20 pound weight loss over past 2 weeks.  - nutrition consulted and recommended regular diet with Ensure Enlive twice per day

## 2024-02-23 NOTE — PROGRESS NOTE ADULT - ASSESSMENT
88M PMH lung cancer, BPH, neuropathy, and anxiety presenting with progressive weakness, dyspnea on exertion, and inability to care for self at home. Admitted to [facility] for further evaluation and management. Palliative care consulted for complex medical decision making in the setting of serious illness

## 2024-02-24 LAB
ANION GAP SERPL CALC-SCNC: 7 MMOL/L — SIGNIFICANT CHANGE UP (ref 5–17)
BUN SERPL-MCNC: 20 MG/DL — SIGNIFICANT CHANGE UP (ref 7–23)
CALCIUM SERPL-MCNC: 8.2 MG/DL — LOW (ref 8.4–10.5)
CHLORIDE SERPL-SCNC: 100 MMOL/L — SIGNIFICANT CHANGE UP (ref 96–108)
CHOLEST FLD-MCNC: 66 MG/DL — SIGNIFICANT CHANGE UP
CO2 SERPL-SCNC: 25 MMOL/L — SIGNIFICANT CHANGE UP (ref 22–31)
CREAT SERPL-MCNC: 0.84 MG/DL — SIGNIFICANT CHANGE UP (ref 0.5–1.3)
EGFR: 84 ML/MIN/1.73M2 — SIGNIFICANT CHANGE UP
GLUCOSE SERPL-MCNC: 102 MG/DL — HIGH (ref 70–99)
HCT VFR BLD CALC: 27.3 % — LOW (ref 39–50)
HGB BLD-MCNC: 8.6 G/DL — LOW (ref 13–17)
MAGNESIUM SERPL-MCNC: 1.7 MG/DL — SIGNIFICANT CHANGE UP (ref 1.6–2.6)
MCHC RBC-ENTMCNC: 24.9 PG — LOW (ref 27–34)
MCHC RBC-ENTMCNC: 31.5 GM/DL — LOW (ref 32–36)
MCV RBC AUTO: 79.1 FL — LOW (ref 80–100)
NRBC # BLD: 0 /100 WBCS — SIGNIFICANT CHANGE UP (ref 0–0)
PHOSPHATE SERPL-MCNC: 2.2 MG/DL — LOW (ref 2.5–4.5)
PLATELET # BLD AUTO: 393 K/UL — SIGNIFICANT CHANGE UP (ref 150–400)
POTASSIUM SERPL-MCNC: 3.3 MMOL/L — LOW (ref 3.5–5.3)
POTASSIUM SERPL-SCNC: 3.3 MMOL/L — LOW (ref 3.5–5.3)
RBC # BLD: 3.45 M/UL — LOW (ref 4.2–5.8)
RBC # FLD: 15.8 % — HIGH (ref 10.3–14.5)
SODIUM SERPL-SCNC: 132 MMOL/L — LOW (ref 135–145)
WBC # BLD: 24.06 K/UL — HIGH (ref 3.8–10.5)
WBC # FLD AUTO: 24.06 K/UL — HIGH (ref 3.8–10.5)

## 2024-02-24 PROCEDURE — 99233 SBSQ HOSP IP/OBS HIGH 50: CPT | Mod: GC

## 2024-02-24 RX ORDER — POTASSIUM PHOSPHATE, MONOBASIC POTASSIUM PHOSPHATE, DIBASIC 236; 224 MG/ML; MG/ML
30 INJECTION, SOLUTION INTRAVENOUS ONCE
Refills: 0 | Status: COMPLETED | OUTPATIENT
Start: 2024-02-24 | End: 2024-02-24

## 2024-02-24 RX ORDER — POLYETHYLENE GLYCOL 3350 17 G/17G
17 POWDER, FOR SOLUTION ORAL EVERY 12 HOURS
Refills: 0 | Status: DISCONTINUED | OUTPATIENT
Start: 2024-02-24 | End: 2024-02-29

## 2024-02-24 RX ORDER — POTASSIUM CHLORIDE 20 MEQ
40 PACKET (EA) ORAL ONCE
Refills: 0 | Status: DISCONTINUED | OUTPATIENT
Start: 2024-02-24 | End: 2024-02-24

## 2024-02-24 RX ADMIN — POTASSIUM PHOSPHATE, MONOBASIC POTASSIUM PHOSPHATE, DIBASIC 83.33 MILLIMOLE(S): 236; 224 INJECTION, SOLUTION INTRAVENOUS at 12:28

## 2024-02-24 RX ADMIN — PIPERACILLIN AND TAZOBACTAM 25 GRAM(S): 4; .5 INJECTION, POWDER, LYOPHILIZED, FOR SOLUTION INTRAVENOUS at 03:12

## 2024-02-24 RX ADMIN — HEPARIN SODIUM 5000 UNIT(S): 5000 INJECTION INTRAVENOUS; SUBCUTANEOUS at 12:28

## 2024-02-24 RX ADMIN — HEPARIN SODIUM 5000 UNIT(S): 5000 INJECTION INTRAVENOUS; SUBCUTANEOUS at 05:51

## 2024-02-24 RX ADMIN — DORNASE ALFA 5 MILLIGRAM(S): 1 SOLUTION RESPIRATORY (INHALATION) at 10:02

## 2024-02-24 RX ADMIN — HEPARIN SODIUM 5000 UNIT(S): 5000 INJECTION INTRAVENOUS; SUBCUTANEOUS at 21:18

## 2024-02-24 RX ADMIN — Medication 4 MILLIGRAM(S): at 21:17

## 2024-02-24 RX ADMIN — PIPERACILLIN AND TAZOBACTAM 25 GRAM(S): 4; .5 INJECTION, POWDER, LYOPHILIZED, FOR SOLUTION INTRAVENOUS at 19:24

## 2024-02-24 RX ADMIN — POLYETHYLENE GLYCOL 3350 17 GRAM(S): 17 POWDER, FOR SOLUTION ORAL at 12:29

## 2024-02-24 RX ADMIN — Medication 250 MILLIGRAM(S): at 11:18

## 2024-02-24 RX ADMIN — Medication 650 MILLIGRAM(S): at 21:17

## 2024-02-24 RX ADMIN — ALTEPLASE 10 MILLIGRAM(S): KIT at 19:08

## 2024-02-24 RX ADMIN — ATORVASTATIN CALCIUM 10 MILLIGRAM(S): 80 TABLET, FILM COATED ORAL at 21:17

## 2024-02-24 RX ADMIN — ALTEPLASE 10 MILLIGRAM(S): KIT at 10:02

## 2024-02-24 RX ADMIN — PIPERACILLIN AND TAZOBACTAM 25 GRAM(S): 4; .5 INJECTION, POWDER, LYOPHILIZED, FOR SOLUTION INTRAVENOUS at 12:28

## 2024-02-24 RX ADMIN — FINASTERIDE 5 MILLIGRAM(S): 5 TABLET, FILM COATED ORAL at 12:28

## 2024-02-24 RX ADMIN — Medication 650 MILLIGRAM(S): at 22:17

## 2024-02-24 RX ADMIN — DORNASE ALFA 5 MILLIGRAM(S): 1 SOLUTION RESPIRATORY (INHALATION) at 19:08

## 2024-02-24 NOTE — PROGRESS NOTE ADULT - PROBLEM SELECTOR PLAN 4
Plan:  - palliative consulted to aid in decision about possible chemo/radiation therapy  - Pulm on board as well, pleural fluid analysis sent, pending results  - encourage PO intake

## 2024-02-24 NOTE — PROGRESS NOTE ADULT - SUBJECTIVE AND OBJECTIVE BOX
Physical Medicine and Rehabilitation Progress Note :       Patient is a 88y old  Male who presents with a chief complaint of Failure to thrive (24 Feb 2024 08:08)      HPI:  88M PMH squamous cell lung cancer, BPH, peripheral neuropathy, and anxiety presenting with progressive weakness, dyspnea on exertion, fatigue, and weight loss x2 weeks. Pt states he was diagnosed with lung cancer around 4 months ago (10/2023) after presenting to his PCP with hemoptysis. Follows with Dr Lawrence outpatient, who he most recently saw on 1/17/24, thoracentesis was done and pleurx was placed for large R sided pleural effusion. During that visit, pt also complained of fatigue and loss of appetite. However, reports that these symptoms have rapidly progressed over the past 2 weeks, during which he lost almost 20 pounds. Pt lives alone in an apartment in [place], without any HHA, however has home PT. He called his PCP Dr Hernandez on Monday 2/19/24 due to these sx, and was instructed to present to ER. ROS +urinary frequency, nocturia, constipation, night sweats, and productive cough with brown/green phlegm. Reports having night sweats for >1 year, noticed that he wets several bedsheets. Denies pain on urination and states polyuria is due to his prostate issues. He was reportedly supposed to start radiation therapy for his lung cancer, however has not received any treatment thus far. Follows with rad onc Dr. Hubbard at [facility].     Pt is a prior smoker, quit in 1976 after smoking for 20 years. He reports he has a sister named Marion in [place], but no friends or family in the area. Does not have official HCP, however he states it would probably be his sister. States that he would like all interventions done, or "whatever is convenient at that time."    ED Course  VS T 98.7 HR 60 /78 RR 17  Labs WBC 24.51 Hgb 9.8 Plt 441 Neut 93.9% Na 133 BUN 25 Cr 0.96   UA Large blood,   Imaging CXR w/ R sided pleural effusion  Interventions 1000cc NS x1     (20 Feb 2024 19:34)                            8.6    24.06 )-----------( 393      ( 24 Feb 2024 05:30 )             27.3       02-24    132<L>  |  100  |  20  ----------------------------<  102<H>  3.3<L>   |  25  |  0.84    Ca    8.2<L>      24 Feb 2024 05:30  Phos  2.2     02-24  Mg     1.7     02-24    TPro  4.8<L>  /  Alb  2.2<L>  /  TBili  0.4  /  DBili  x   /  AST  47<H>  /  ALT  41  /  AlkPhos  120  02-23    Vital Signs Last 24 Hrs  T(C): 36.6 (24 Feb 2024 12:15), Max: 37.3 (23 Feb 2024 20:21)  T(F): 97.9 (24 Feb 2024 12:15), Max: 99.2 (23 Feb 2024 20:21)  HR: 99 (24 Feb 2024 12:15) (85 - 99)  BP: 100/59 (24 Feb 2024 12:15) (99/55 - 105/65)  BP(mean): --  RR: 17 (24 Feb 2024 12:15) (17 - 18)  SpO2: 96% (24 Feb 2024 12:15) (94% - 96%)    Parameters below as of 24 Feb 2024 12:15  Patient On (Oxygen Delivery Method): room air        MEDICATIONS  (STANDING):  alteplase  Injectable for Pleural Effusion 10 milliGRAM(s) IntraPleural. every 12 hours  atorvastatin 10 milliGRAM(s) Oral at bedtime  dornase maria Solution for Pleural Effusion 5 milliGRAM(s) IntraPleural. every 12 hours  doxazosin 4 milliGRAM(s) Oral at bedtime  finasteride 5 milliGRAM(s) Oral daily  heparin   Injectable 5000 Unit(s) SubCutaneous every 8 hours  piperacillin/tazobactam IVPB.. 4.5 Gram(s) IV Intermittent every 8 hours  polyethylene glycol 3350 17 Gram(s) Oral daily  potassium phosphate IVPB 30 milliMole(s) IV Intermittent once  senna 2 Tablet(s) Oral at bedtime  vancomycin  IVPB 750 milliGRAM(s) IV Intermittent every 24 hours    MEDICATIONS  (PRN):  acetaminophen     Tablet .. 650 milliGRAM(s) Oral every 6 hours PRN Temp greater or equal to 38C (100.4F), Mild Pain (1 - 3)  melatonin 3 milliGRAM(s) Oral at bedtime PRN Sleep      Initial Functional Status Assessment :       Previous Level of Function:     · Ambulation Skills	needs device; RW  · Transfer Skills	needed assist; needs device  · ADL Skills	needed assist  · Additional Comments	Patient is a limited household ambulator who lives alone in elevator access apartment, +3 JASPER. Ambulates with RW and has HHA services throughout the week to assist with ADLs. Unclear frequency of HHA.    Cognitive Status Examination:   · Orientation	oriented to person, place, time and situation  · Level of Consciousness	alert  · Follows Commands and Answers Questions	100% of the time  · Personal Safety and Judgment	intact  · Short Term Memory	intact    Range of Motion Exam:   · Active Range of Motion Examination	bilateral upper extremity Active ROM was WFL (within functional limits); bilateral  lower extremity Active ROM was WFL (within functional limits)    Manual Muscle Testing:   · Manual Muscle Testing Results	BUE and BLE >3/5 grossly assessed via functional mobility    Bed Mobility: Rolling/Turning:     · Level of [place]	contact guard  · Physical Assist/Nonphysical Assist	verbal cues  · Assistive Device	bed rails    Bed Mobility: Scooting/Bridging:     · Level of [place]	contact guard  · Physical Assist/Nonphysical Assist	verbal cues    Bed Mobility: Sit to Supine:     · Level of [place]	contact guard; minimum assist (75% patients effort)  · Physical Assist/Nonphysical Assist	1 person assist; verbal cues    Bed Mobility: Supine to Sit:     · Level of [place]	contact guard  · Physical Assist/Nonphysical Assist	verbal cues    Bed Mobility Analysis:     · Bed Mobility Limitations	decreased ability to use legs for bridging/pushing; impaired ability to control trunk for mobility; decreased ability to use arms for pushing/pulling  · Impairments Contributing to Impaired Bed Mobility	impaired balance; decreased flexibility; impaired postural control; decreased strength; decreased ROM    Transfer: Sit to Stand:     · Level of [place]	minimum assist (75% patients effort)  · Physical Assist/Nonphysical Assist	1 person assist; verbal cues  · Assistive Device	rolling walker    Transfer: Stand to Sit:     · Level of [place]	minimum assist (75% patients effort)  · Physical Assist/Nonphysical Assist	1 person assist; verbal cues  · Assistive Device	rolling walker    Sit/Stand Transfer Safety Analysis:     · Transfer Safety Concerns Noted	Demo fair eccentric control during descend; decreased weight-shifting ability  · Impairments Contributing to Impaired Transfers	impaired balance; decreased flexibility; impaired postural control; decreased ROM; decreased strength    Gait Skills:     · Level of [place]	minimum assist (75% patients effort)  · Physical Assist/Nonphysical Assist	2 person assist; verbal cues  · Assistive Device	rolling walker  · Gait Distance	5 sidesteps at EOB    Gait Analysis:     · Gait Deviations Noted	decreased kacie; increased time in double stance; decreased velocity of limb motion; decreased stride length; decreased step length; decreased weight-shifting ability  · Impairments Contributing to Gait Deviations	impaired balance; decreased flexibility; decreased ROM; impaired postural control; decreased strength    Balance Skills Assessment:     · Sitting Balance: Static	good balance  · Sitting Balance: Dynamic	good balance  · Sit-to-Stand Balance	poor plus  · Standing Balance: Static	poor plus  · Standing Balance: Dynamic	poor plus  · Systems Impairment Contributing to Balance Disturbance	musculoskeletal  · Identified Impairments Contributing to Balance Disturbance	decreased strength; decreased ROM    Clinical Impressions:   · Criteria for Skilled Therapeutic Interventions	impairments found; rehab potential; therapy frequency; anticipated equipment needs at discharge; anticipated discharge recommendation  · Impairments Found (describe specific impairments)	aerobic capacity/endurance; gait, locomotion, and balance; muscle strength      Upper Body Dressing Training:     · Level of [place]	minimum assist (75% patients effort)  · Physical Assist/Nonphysical Assist	1 person assist; nonverbal cues (demo/gestures); verbal cues    Lower Body Dressing Training:     · Level of [place]	maximum assist (25% patients effort); don B socks at EOB  · Physical Assist/Nonphysical Assist	1 person assist; nonverbal cues (demo/gestures)          PM&R Impression : as above    Current disposition plan recommendation :    subacute rehab placement

## 2024-02-24 NOTE — PROGRESS NOTE ADULT - PROBLEM SELECTOR PLAN 7
IMPROVING  The patient was complaining of constipation overnight. Started on Miralax, however, per nursing, the patient was given senna and had a large bowel movement, therefore, Miralax was held.   He is complaining of abdominal discomfort at this time, improved from admission  Abdominal xray with stool burden.  Plan:  - c/w senna at night, patient refused overnight and simethicone was given  - Miralax PRN

## 2024-02-24 NOTE — PROGRESS NOTE ADULT - SUBJECTIVE AND OBJECTIVE BOX
SUBJECTIVE:  The patient was seen and examined at the bedside. He reported no new issues today. No acute complaints.     Vital Signs Last 24 Hrs  T(C): 36.6 (24 Feb 2024 12:15), Max: 37.3 (23 Feb 2024 20:21)  T(F): 97.9 (24 Feb 2024 12:15), Max: 99.2 (23 Feb 2024 20:21)  HR: 99 (24 Feb 2024 12:15) (85 - 99)  BP: 100/59 (24 Feb 2024 12:15) (99/55 - 105/65)  BP(mean): --  RR: 17 (24 Feb 2024 12:15) (17 - 18)  SpO2: 96% (24 Feb 2024 12:15) (94% - 96%)    Parameters below as of 24 Feb 2024 12:15  Patient On (Oxygen Delivery Method): room air        PHYSICAL EXAM:  General: NAD; speaking in full sentences  HEENT: NC/AT; PERRL; EOMI; MMM  Neck: supple; no JVD  Cardiac: RRR; +S1/S2  Pulm: non labored breathing.  GI: soft, NT/ND, +BS  Extremities: WWP; no edema, clubbing or cyanosis  Vasc: 2+ radial, DP pulses B/L  Neuro: AAOx3; no focal deficits      02-24    132<L>  |  100  |  20  ----------------------------<  102<H>  3.3<L>   |  25  |  0.84    Ca    8.2<L>      24 Feb 2024 05:30  Phos  2.2     02-24  Mg     1.7     02-24    TPro  4.8<L>  /  Alb  2.2<L>  /  TBili  0.4  /  DBili  x   /  AST  47<H>  /  ALT  41  /  AlkPhos  120  02-23                        8.6    24.06 )-----------( 393      ( 24 Feb 2024 05:30 )             27.3

## 2024-02-24 NOTE — PROGRESS NOTE ADULT - ASSESSMENT
88M PMH squamous cell lung cancer, BPH, peripheral neuropathy, and anxiety presenting with progressive weakness, dyspnea on exertion, fatigue, and weight loss x2 weeks. Pulmonary consulted for known MPE and IPC.     #Malignant Pleural Effusion s/p IPC placement   #SCC of the Lung, Stage IIIA     Presenting with failure to thrive in the setting of known malignancy which has yet to be treated. Pulmonary consulted for known MPE s/p IPC placement by Dr. Lawrence. Currently on room air saturating well. Has leukocytosis, but otherwise no focal infectious symptoms. On bedside US, right sided small pleural effusion with visible septations. Per Dr. Lawrence who placed pleurx, effusion was septated when pleurx was initially placed one month ago. Initially unable to drain pleurx, instilled 5 mg tPA and now draining freely. Minimal output and cell count shows 95% neutrophils. Glucose 14 and LDH >2500, concerning for complicated parapneumonic effusion.    Recommendations:  -will start MIST 2 protocol with tPA/dornase instilled through pleurx twice daily (max 6 doses); second dose given this morning   -pulm will manage pleurx connected to chest tube  -c/w broad spectrum abx    Case s/e/d with Dr. Hopkins

## 2024-02-24 NOTE — PROGRESS NOTE ADULT - SUBJECTIVE AND OBJECTIVE BOX
PULMONARY CONSULT SERVICE FOLLOW-UP NOTE    INTERVAL HPI:  Reviewed chart and overnight events; patient seen and examined at bedside. S/p first dose of MIST II last night with drainage of additional 500 cc overnight. Feels well this morning. No fevers or chills. Persistent leukocytosis. Started on antibiotics. ROS otherwise negative.     MEDICATIONS:  Pulmonary:  dornase maria Solution for Pleural Effusion 5 milliGRAM(s) IntraPleural. every 12 hours    Antimicrobials:  piperacillin/tazobactam IVPB.. 4.5 Gram(s) IV Intermittent every 8 hours  vancomycin  IVPB 750 milliGRAM(s) IV Intermittent every 24 hours    Anticoagulants:  alteplase  Injectable for Pleural Effusion 10 milliGRAM(s) IntraPleural. every 12 hours  heparin   Injectable 5000 Unit(s) SubCutaneous every 8 hours    Cardiac:  doxazosin 4 milliGRAM(s) Oral at bedtime      Allergies    No Known Allergies    Intolerances        Vital Signs Last 24 Hrs  T(C): 36.3 (24 Feb 2024 06:38), Max: 37.3 (23 Feb 2024 20:21)  T(F): 97.3 (24 Feb 2024 06:38), Max: 99.2 (23 Feb 2024 20:21)  HR: 85 (24 Feb 2024 06:38) (85 - 100)  BP: 99/55 (24 Feb 2024 06:38) (99/55 - 105/65)  BP(mean): --  RR: 18 (24 Feb 2024 06:38) (16 - 18)  SpO2: 94% (24 Feb 2024 06:38) (94% - 95%)    Parameters below as of 24 Feb 2024 06:38  Patient On (Oxygen Delivery Method): room air        02-23 @ 07:01  -  02-24 @ 07:00  --------------------------------------------------------  IN: 300 mL / OUT: 1775 mL / NET: -1475 mL          PHYSICAL EXAM:  Constitutional: well-appearing  HEENT: NC/AT; PERRL, anicteric sclera; MMM  Neck: supple  Cardiovascular: +S1/S2, RRR  Respiratory: CTA B/L; no W/R/R  Gastrointestinal: soft, NT/ND  Extremities: WWP; no edema, clubbing or cyanosis  Vascular: 2+ radial pulses B/L  Neurological: awake and alert; SCHMIDT    LABS:      CBC Full  -  ( 24 Feb 2024 05:30 )  WBC Count : 24.06 K/uL  RBC Count : 3.45 M/uL  Hemoglobin : 8.6 g/dL  Hematocrit : 27.3 %  Platelet Count - Automated : 393 K/uL  Mean Cell Volume : 79.1 fl  Mean Cell Hemoglobin : 24.9 pg  Mean Cell Hemoglobin Concentration : 31.5 gm/dL  Auto Neutrophil # : x  Auto Lymphocyte # : x  Auto Monocyte # : x  Auto Eosinophil # : x  Auto Basophil # : x  Auto Neutrophil % : x  Auto Lymphocyte % : x  Auto Monocyte % : x  Auto Eosinophil % : x  Auto Basophil % : x    02-23    135  |  99  |  23  ----------------------------<  120<H>  3.9   |  25  |  0.91    Ca    8.4      23 Feb 2024 14:49  Phos  2.7     02-23  Mg     1.9     02-23    TPro  4.8<L>  /  Alb  2.2<L>  /  TBili  0.4  /  DBili  x   /  AST  47<H>  /  ALT  41  /  AlkPhos  120  02-23          Urinalysis Basic - ( 23 Feb 2024 14:49 )    Color: x / Appearance: x / SG: x / pH: x  Gluc: 120 mg/dL / Ketone: x  / Bili: x / Urobili: x   Blood: x / Protein: x / Nitrite: x   Leuk Esterase: x / RBC: x / WBC x   Sq Epi: x / Non Sq Epi: x / Bacteria: x                RADIOLOGY & ADDITIONAL STUDIES:

## 2024-02-24 NOTE — PROGRESS NOTE ADULT - TIME BILLING
Reviewed hospital course to date, blood work, vital signs  Independently reviewed most recent CXR  Case discussed with house staff, pulm  Case discussed with CM/SW  Thorough H/P  Documentation

## 2024-02-24 NOTE — PROGRESS NOTE ADULT - PROBLEM SELECTOR PLAN 9
Hgb on presentation 9.8, last hgb 11.7 1/2024. Pt denies hematuria, hematemesis, hemoptysis, or melena. +constipation. UA +RBC and large blood. Likely AOCD and nutritional deficiencies i/s/o progressing lung cancer and significant weight loss.  Iron studies noted  Plan:  - maintain active T&S  - Transfuse if Hgb <7

## 2024-02-24 NOTE — PROGRESS NOTE ADULT - ASSESSMENT
I M    88 y o M PMH lung cancer with pleural effusions s/p Pleurx placement prior to this hospitalization, BPH, neuropathy, and anxiety presenting with progressive weakness, dyspnea on exertion, and inability to care for self at home. Admitted to [facility] for further evaluation and management of Failure to Thrive. Course c/b urinary retention needed ruth placement on 2/22.     Nutritional Assessment:  · Nutritional Assessment	This patient has been assessed with a concern for Malnutrition and has been determined to have a diagnosis/diagnoses of Severe protein-calorie malnutrition and Underweight (BMI < 19).    This patient is being managed with:   Diet Regular-  Supplement Feeding Modality:  Oral  Ensure Enlive Cans or Servings Per Day:  1       Frequency:  Two Times a day  Entered: Feb 22 2024  5:03AM    Problem/Plan - 1:  ·  Problem: Failure to thrive in adult.   ·  Plan: I/s/o multiple medical problems including known malignancy, chronic gait instability. Progressive weight loss since lung cancer diagnosis, however reports >20 pound weight loss over past 2 weeks. Lives alone and unable to cook for self as becomes fatigued and has unstable gait.   Plan:  - regular diet  - nutrition consulted  - PT evaluated and recommended [facility]ENCHO   - palliative on board, f/u recs.    Problem/Plan - 2:  ·  Problem: SIRS (systemic inflammatory response syndrome).   ·  Plan: Patient meeting SIRS criteria (HR >90 and Leukocytosis. His leukocytosis is worsening from admission. No fevers recorded. No clear infectious source at this time and is low on differential at this time.   Plan:  - Will continue to watch WBC and keep him off antibiotics at this time given no clear source of infection.    Problem/Plan - 3:  ·  Problem: Urinary retention.   ·  Plan: Patient with Hx of BPH on medications outpatient. BS today showed 1.7L of urine in bladder. Trial of straight cath was unsuccessful therefore urology was called for Ruth placement. 1.5L was removed from bladder.   Urine output appropriate after the large amount of urine extracted during ruth placement  Plan:  - Stopped fluids this AM.   - watch for post-obstructive diuresis   - Continue Ruth for at least 3 days per urology recs and TOV afterwards.    Problem/Plan - 4:  ·  Problem: Squamous cell lung cancer.   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 Follows with Dr Lawrence and Dr Fletcher (oncologist). s/p bronchoscopy, pleurx placement, and thoracentesis outpatient. EBUS FNA bx and BAL 11/2023 +malignant cells. RLL bx +SCC. Pending radiation treatment, last seen 12/2023 and was determined to be a good candidate for chemoradiation at that time however did not receive tx due to large pleural effusion. Now p/w FTT, progressive dyspnea, fatigue, weight loss, night sweats.  Plan:  - palliative consulted to aid in decision about possible chemo/radiation therapy  - Pulm on board as well, pleural fluid analysis sent, pending results  - encourage PO intake.    Problem/Plan - 5:  ·  Problem: Pleural effusion.   ·  Plan: CXR this admission with redemonstrated pleural effusion. Follows with Dr Lawrence outpatient. Currently comfortable on RA, however pt reports dyspnea on exertion and discomfort at pleurx site.   s/p TPA with Pulm on 2/22  Plan:  - pending pleural fluid analysis   - Drain Plurx as scheduled   - Pending Palliative conversation for further care.    Problem/Plan - 6:  ·  Problem: Leukocytosis.   ·  Plan: Pt presenting w/ WBC 24 (last WBC ~15 1/2024), elevated neutrophils, as well as productive cough w/ yellow/brown phlegm. Pt reports night sweats, fatigue, and weight loss however denies fevers/chills at home. Mostly homebound and no recent sick contacts. s/p 1000cc NS bolus in ED. Lactate WNL. Platelets elevated, and i/s/o poor PO intake, suspect some hemoconcentration.  ESR elevated  Plan:  - watch off abx at this time  - f/u BCx.    Problem/Plan - 7:  ·  Problem: Constipation.   ·  Plan: IMPROVING  The patient was complaining of constipation overnight. Started on Miralax, however, per nursing, the patient was given senna and had a large bowel movement, therefore, Miralax was held.   He is complaining of abdominal discomfort at this time, improved from admission  Abdominal xray with stool burden.  Plan:  - c/w senna at night, patient refused overnight and simethicone was given  - Miralax PRN.    Problem/Plan - 8:  ·  Problem: Protein calorie malnutrition.   ·  Plan: BMI 18, >20 pound weight loss over past 2 weeks.  - nutrition consulted and recommended regular diet with Ensure Enlive twice per day.    Problem/Plan - 9:  ·  Problem: Normocytic anemia.   ·  Plan: Hgb on presentation 9.8, last hgb 11.7 1/2024. Pt denies hematuria, hematemesis, hemoptysis, or melena. +constipation. UA +RBC and large blood. Likely AOCD and nutritional deficiencies i/s/o progressing lung cancer and significant weight loss.  Iron studies noted  Plan:  - maintain active T&S  - Transfuse if Hgb <7.    Problem/Plan - 10:  ·  Problem: History of BPH.   ·  Plan; Last seen urologist prior to COVID, per KAMILA was seen in 2017. Takes terazosin 5mg and finasteride 5mg at home. Reports chronic nocturia and polyuria that has not particularly worsened.  - c/w home meds  - TOV in 3 days given acute urinary retention.    Problem/Plan - 11:  ·  Problem: Prophylactic measure.   ·  Plan: F: Encourage PO  E: K>4 Mg>2  N: Regular  GI: None  DVT: Lovenox 30 subq  Dispo: [facility]CHO per PM&R and  PT.

## 2024-02-24 NOTE — PROGRESS NOTE ADULT - ASSESSMENT
88M PMH lung cancer with pleural effusions s/p Pleurx placement prior to this hospitalization, BPH, neuropathy, and anxiety presenting with progressive weakness, dyspnea on exertion, and inability to care for self at home. Admitted to [facility] for further evaluation and management of Failure to Thrive. Course c/b urinary retention needed ruth placement on 2/22.

## 2024-02-24 NOTE — PROGRESS NOTE ADULT - PROBLEM SELECTOR PLAN 5
pulm following. broad abx. dornase. cultures ngtd.     - pending pleural fluid analysis   - Drain Plurx as scheduled   - Pending Palliative conversation for further care

## 2024-02-24 NOTE — PROGRESS NOTE ADULT - PROBLEM SELECTOR PLAN 1
I/s/o multiple medical problems including known malignancy, chronic gait instability. Progressive weight loss since lung cancer diagnosis, however reports >20 pound weight loss over past 2 weeks. Lives alone and unable to cook for self as becomes fatigued and has unstable gait.   Plan:  - regular diet  - nutrition consulted  - PT evaluated and recommended [facility]ENCHO   - palliative on board, f/u recs

## 2024-02-25 LAB
ANION GAP SERPL CALC-SCNC: 9 MMOL/L — SIGNIFICANT CHANGE UP (ref 5–17)
ANISOCYTOSIS BLD QL: SLIGHT — SIGNIFICANT CHANGE UP
BASOPHILS # BLD AUTO: 0 K/UL — SIGNIFICANT CHANGE UP (ref 0–0.2)
BASOPHILS NFR BLD AUTO: 0 % — SIGNIFICANT CHANGE UP (ref 0–2)
BUN SERPL-MCNC: 19 MG/DL — SIGNIFICANT CHANGE UP (ref 7–23)
BURR CELLS BLD QL SMEAR: PRESENT — SIGNIFICANT CHANGE UP
CALCIUM SERPL-MCNC: 8.1 MG/DL — LOW (ref 8.4–10.5)
CHLORIDE SERPL-SCNC: 98 MMOL/L — SIGNIFICANT CHANGE UP (ref 96–108)
CO2 SERPL-SCNC: 25 MMOL/L — SIGNIFICANT CHANGE UP (ref 22–31)
CREAT SERPL-MCNC: 0.81 MG/DL — SIGNIFICANT CHANGE UP (ref 0.5–1.3)
CULTURE RESULTS: SIGNIFICANT CHANGE UP
CULTURE RESULTS: SIGNIFICANT CHANGE UP
EGFR: 85 ML/MIN/1.73M2 — SIGNIFICANT CHANGE UP
EOSINOPHIL # BLD AUTO: 0.24 K/UL — SIGNIFICANT CHANGE UP (ref 0–0.5)
EOSINOPHIL NFR BLD AUTO: 0.9 % — SIGNIFICANT CHANGE UP (ref 0–6)
GLUCOSE SERPL-MCNC: 119 MG/DL — HIGH (ref 70–99)
HCT VFR BLD CALC: 28.1 % — LOW (ref 39–50)
HGB BLD-MCNC: 9 G/DL — LOW (ref 13–17)
HYPOCHROMIA BLD QL: SLIGHT — SIGNIFICANT CHANGE UP
LYMPHOCYTES # BLD AUTO: 1.19 K/UL — SIGNIFICANT CHANGE UP (ref 1–3.3)
LYMPHOCYTES # BLD AUTO: 4.4 % — LOW (ref 13–44)
MAGNESIUM SERPL-MCNC: 1.6 MG/DL — SIGNIFICANT CHANGE UP (ref 1.6–2.6)
MANUAL SMEAR VERIFICATION: SIGNIFICANT CHANGE UP
MCHC RBC-ENTMCNC: 25.1 PG — LOW (ref 27–34)
MCHC RBC-ENTMCNC: 32 GM/DL — SIGNIFICANT CHANGE UP (ref 32–36)
MCV RBC AUTO: 78.3 FL — LOW (ref 80–100)
MICROCYTES BLD QL: SLIGHT — SIGNIFICANT CHANGE UP
MONOCYTES # BLD AUTO: 1.4 K/UL — HIGH (ref 0–0.9)
MONOCYTES NFR BLD AUTO: 5.2 % — SIGNIFICANT CHANGE UP (ref 2–14)
NEUTROPHILS # BLD AUTO: 24.15 K/UL — HIGH (ref 1.8–7.4)
NEUTROPHILS NFR BLD AUTO: 89.5 % — HIGH (ref 43–77)
OVALOCYTES BLD QL SMEAR: SIGNIFICANT CHANGE UP
PHOSPHATE SERPL-MCNC: 2.3 MG/DL — LOW (ref 2.5–4.5)
PLAT MORPH BLD: NORMAL — SIGNIFICANT CHANGE UP
PLATELET # BLD AUTO: 396 K/UL — SIGNIFICANT CHANGE UP (ref 150–400)
POIKILOCYTOSIS BLD QL AUTO: SIGNIFICANT CHANGE UP
POLYCHROMASIA BLD QL SMEAR: SLIGHT — SIGNIFICANT CHANGE UP
POTASSIUM SERPL-MCNC: 3.3 MMOL/L — LOW (ref 3.5–5.3)
POTASSIUM SERPL-SCNC: 3.3 MMOL/L — LOW (ref 3.5–5.3)
RBC # BLD: 3.59 M/UL — LOW (ref 4.2–5.8)
RBC # FLD: 15.8 % — HIGH (ref 10.3–14.5)
RBC BLD AUTO: ABNORMAL
SODIUM SERPL-SCNC: 132 MMOL/L — LOW (ref 135–145)
SPECIMEN SOURCE: SIGNIFICANT CHANGE UP
SPECIMEN SOURCE: SIGNIFICANT CHANGE UP
SPHEROCYTES BLD QL SMEAR: SLIGHT — SIGNIFICANT CHANGE UP
WBC # BLD: 26.98 K/UL — HIGH (ref 3.8–10.5)
WBC # FLD AUTO: 26.98 K/UL — HIGH (ref 3.8–10.5)

## 2024-02-25 PROCEDURE — 99232 SBSQ HOSP IP/OBS MODERATE 35: CPT | Mod: GC

## 2024-02-25 RX ORDER — POTASSIUM PHOSPHATE, MONOBASIC POTASSIUM PHOSPHATE, DIBASIC 236; 224 MG/ML; MG/ML
30 INJECTION, SOLUTION INTRAVENOUS ONCE
Refills: 0 | Status: COMPLETED | OUTPATIENT
Start: 2024-02-25 | End: 2024-02-25

## 2024-02-25 RX ORDER — DORNASE ALFA 1 MG/ML
5 SOLUTION RESPIRATORY (INHALATION) EVERY 12 HOURS
Refills: 0 | Status: DISCONTINUED | OUTPATIENT
Start: 2024-02-25 | End: 2024-02-26

## 2024-02-25 RX ORDER — ALTEPLASE 100 MG
10 KIT INTRAVENOUS EVERY 12 HOURS
Refills: 0 | Status: DISCONTINUED | OUTPATIENT
Start: 2024-02-25 | End: 2024-02-26

## 2024-02-25 RX ADMIN — FINASTERIDE 5 MILLIGRAM(S): 5 TABLET, FILM COATED ORAL at 13:08

## 2024-02-25 RX ADMIN — HEPARIN SODIUM 5000 UNIT(S): 5000 INJECTION INTRAVENOUS; SUBCUTANEOUS at 04:25

## 2024-02-25 RX ADMIN — HEPARIN SODIUM 5000 UNIT(S): 5000 INJECTION INTRAVENOUS; SUBCUTANEOUS at 13:08

## 2024-02-25 RX ADMIN — ALTEPLASE 10 MILLIGRAM(S): KIT at 07:20

## 2024-02-25 RX ADMIN — POLYETHYLENE GLYCOL 3350 17 GRAM(S): 17 POWDER, FOR SOLUTION ORAL at 19:06

## 2024-02-25 RX ADMIN — Medication 4 MILLIGRAM(S): at 21:17

## 2024-02-25 RX ADMIN — HEPARIN SODIUM 5000 UNIT(S): 5000 INJECTION INTRAVENOUS; SUBCUTANEOUS at 21:17

## 2024-02-25 RX ADMIN — DORNASE ALFA 5 MILLIGRAM(S): 1 SOLUTION RESPIRATORY (INHALATION) at 18:47

## 2024-02-25 RX ADMIN — PIPERACILLIN AND TAZOBACTAM 25 GRAM(S): 4; .5 INJECTION, POWDER, LYOPHILIZED, FOR SOLUTION INTRAVENOUS at 13:08

## 2024-02-25 RX ADMIN — Medication 650 MILLIGRAM(S): at 21:18

## 2024-02-25 RX ADMIN — ALTEPLASE 10 MILLIGRAM(S): KIT at 18:46

## 2024-02-25 RX ADMIN — ATORVASTATIN CALCIUM 10 MILLIGRAM(S): 80 TABLET, FILM COATED ORAL at 21:18

## 2024-02-25 RX ADMIN — Medication 650 MILLIGRAM(S): at 22:18

## 2024-02-25 RX ADMIN — DORNASE ALFA 5 MILLIGRAM(S): 1 SOLUTION RESPIRATORY (INHALATION) at 07:20

## 2024-02-25 RX ADMIN — PIPERACILLIN AND TAZOBACTAM 25 GRAM(S): 4; .5 INJECTION, POWDER, LYOPHILIZED, FOR SOLUTION INTRAVENOUS at 19:05

## 2024-02-25 RX ADMIN — SENNA PLUS 2 TABLET(S): 8.6 TABLET ORAL at 21:17

## 2024-02-25 RX ADMIN — PIPERACILLIN AND TAZOBACTAM 25 GRAM(S): 4; .5 INJECTION, POWDER, LYOPHILIZED, FOR SOLUTION INTRAVENOUS at 04:25

## 2024-02-25 RX ADMIN — POTASSIUM PHOSPHATE, MONOBASIC POTASSIUM PHOSPHATE, DIBASIC 83.33 MILLIMOLE(S): 236; 224 INJECTION, SOLUTION INTRAVENOUS at 13:08

## 2024-02-25 NOTE — PROGRESS NOTE ADULT - SUBJECTIVE AND OBJECTIVE BOX
PULMONARY CONSULT SERVICE FOLLOW-UP NOTE    INTERVAL HPI:  Reviewed chart and overnight events; patient seen and examined at bedside. Feels better, continues to have about 500 cc output from pleurx daily. S/p 4th dose of MIST II this morning. Persistent leukocytosis. ROS otherwise negative.     MEDICATIONS:  Pulmonary:    Antimicrobials:  piperacillin/tazobactam IVPB.. 4.5 Gram(s) IV Intermittent every 8 hours    Anticoagulants:  heparin   Injectable 5000 Unit(s) SubCutaneous every 8 hours    Cardiac:  doxazosin 4 milliGRAM(s) Oral at bedtime      Allergies    No Known Allergies    Intolerances        Vital Signs Last 24 Hrs  T(C): 37.3 (25 Feb 2024 06:23), Max: 37.4 (24 Feb 2024 21:05)  T(F): 99.2 (25 Feb 2024 06:23), Max: 99.4 (24 Feb 2024 21:05)  HR: 89 (25 Feb 2024 06:23) (89 - 100)  BP: 105/55 (25 Feb 2024 06:23) (97/56 - 105/55)  BP(mean): 70 (24 Feb 2024 21:05) (70 - 70)  RR: 18 (25 Feb 2024 06:23) (17 - 18)  SpO2: 96% (25 Feb 2024 06:23) (95% - 96%)    Parameters below as of 25 Feb 2024 06:23  Patient On (Oxygen Delivery Method): room air        02-24 @ 07:01  -  02-25 @ 07:00  --------------------------------------------------------  IN: 0 mL / OUT: 1150 mL / NET: -1150 mL          PHYSICAL EXAM:  Constitutional: cachectic  HEENT: NC/AT; PERRL, anicteric sclera; MMM  Neck: supple  Cardiovascular: +S1/S2, RRR  Respiratory: CTA B/L; pleurx c/d/i  Gastrointestinal: soft, NT/ND  Extremities: WWP; no edema, clubbing or cyanosis  Vascular: 2+ radial pulses B/L  Neurological: awake and alert; SCHMIDT    LABS:      CBC Full  -  ( 25 Feb 2024 08:33 )  WBC Count : 26.98 K/uL  RBC Count : 3.59 M/uL  Hemoglobin : 9.0 g/dL  Hematocrit : 28.1 %  Platelet Count - Automated : 396 K/uL  Mean Cell Volume : 78.3 fl  Mean Cell Hemoglobin : 25.1 pg  Mean Cell Hemoglobin Concentration : 32.0 gm/dL  Auto Neutrophil # : 24.15 K/uL  Auto Lymphocyte # : 1.19 K/uL  Auto Monocyte # : 1.40 K/uL  Auto Eosinophil # : 0.24 K/uL  Auto Basophil # : 0.00 K/uL  Auto Neutrophil % : 89.5 %  Auto Lymphocyte % : 4.4 %  Auto Monocyte % : 5.2 %  Auto Eosinophil % : 0.9 %  Auto Basophil % : 0.0 %    02-25    132<L>  |  98  |  19  ----------------------------<  119<H>  3.3<L>   |  25  |  0.81    Ca    8.1<L>      25 Feb 2024 08:33  Phos  2.3     02-25  Mg     1.6     02-25            Urinalysis Basic - ( 25 Feb 2024 08:33 )    Color: x / Appearance: x / SG: x / pH: x  Gluc: 119 mg/dL / Ketone: x  / Bili: x / Urobili: x   Blood: x / Protein: x / Nitrite: x   Leuk Esterase: x / RBC: x / WBC x   Sq Epi: x / Non Sq Epi: x / Bacteria: x                RADIOLOGY & ADDITIONAL STUDIES:

## 2024-02-25 NOTE — PROGRESS NOTE ADULT - PROBLEM SELECTOR PLAN 5
pulm following. broad abx. dornase. cultures ngtd. fluisd c/w parapnemonic effusion.    - Drain Plurx as scheduled  - On MIST II protocol  - Pending Palliative conversation for further care

## 2024-02-25 NOTE — PROGRESS NOTE ADULT - PROBLEM SELECTOR PLAN 3
Patient with Hx of BPH on medications outpatient. BS today showed 1.7L of urine in bladder. Trial of straight cath was unsuccessful therefore urology was called for Ruth placement. 1.5L was removed from bladder.   Urine output appropriate after the large amount of urine extracted during ruth placement    Plan:  - Continue Ruth for at least 3 days per urology recs and TOV afterwards.

## 2024-02-25 NOTE — PROGRESS NOTE ADULT - PROBLEM SELECTOR PLAN 4
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 Follows with Dr Lawrence and Dr Fletcher (oncologist). s/p bronchoscopy, pleurx placement, and thoracentesis outpatient. EBUS FNA bx and BAL 11/2023 +malignant cells. RLL bx +SCC. Pending radiation treatment, last seen 12/2023 and was determined to be a good candidate for chemoradiation at that time however did not receive tx due to large pleural effusion. Now p/w FTT, progressive dyspnea, fatigue, weight loss, night sweats.    Plan:  - palliative consulted to aid in decision about possible chemo/radiation therapy  - Pulm on board as well, pleural fluid analysis sent, pending results  - encourage PO intake.

## 2024-02-25 NOTE — PROGRESS NOTE ADULT - ASSESSMENT
88M PMH lung cancer with pleural effusions s/p Pleurx placement prior to this hospitalization, BPH, neuropathy, and anxiety presenting with progressive weakness, dyspnea on exertion, and inability to care for self at home. Admitted to [facility] for further evaluation and management of Failure to Thrive. Course c/b urinary retention needed ruth placement on 2/22 and parapnemonic effusion.

## 2024-02-25 NOTE — PROGRESS NOTE ADULT - ASSESSMENT
88M PMH squamous cell lung cancer, BPH, peripheral neuropathy, and anxiety presenting with progressive weakness, dyspnea on exertion, fatigue, and weight loss x2 weeks. Pulmonary consulted for known MPE and IPC.     #Malignant Pleural Effusion s/p IPC placement   #SCC of the Lung, Stage IIIA     Presenting with failure to thrive in the setting of known malignancy which has yet to be treated. Pulmonary consulted for known MPE s/p IPC placement by Dr. Lawrence. Currently on room air saturating well. Has leukocytosis, but otherwise no focal infectious symptoms. On bedside US, right sided small pleural effusion with visible septations. Per Dr. Lawrence who placed pleurx, effusion was septated when pleurx was initially placed one month ago. Initially unable to drain pleurx, instilled 5 mg tPA and now draining freely. Minimal output and cell count shows 95% neutrophils. Glucose 14 and LDH >2500, concerning for complicated parapneumonic effusion.    Recommendations:  -will start MIST 2 protocol with tPA/dornase instilled through pleurx twice daily (max 6 doses); 4th dose given this morning   -pulm will manage pleurx connected to chest tube  -c/w broad spectrum abx    Case s/e/d with Dr. Hopkins

## 2024-02-25 NOTE — PROGRESS NOTE ADULT - SUBJECTIVE AND OBJECTIVE BOX
OVERNIGHT EVENTS: MAYANK, VSS.     SUBJECTIVE / INTERVAL HPI: Patient seen and examined at bedside. Feeling well, endorses some soreness at chest tube site.     PHYSICAL EXAM:     General: NAD; speaking in full sentences  HEENT: NC/AT; PERRL; EOMI; MMM  Neck: supple; no JVD  Cardiac: RRR; +S1/S2, chest tube draining serosanguinous fluid  Pulm: CTA B/L; no W/R/R  GI: soft, NT/ND, +BS  Extremities: WWP; no edema, clubbing or cyanosis  Vasc: 2+ radial, DP pulses B/L  Neuro: AAOx3; no focal deficits    VITAL SIGNS:  Vital Signs Last 24 Hrs  T(C): 36.6 (25 Feb 2024 13:29), Max: 37.4 (24 Feb 2024 21:05)  T(F): 97.8 (25 Feb 2024 13:29), Max: 99.4 (24 Feb 2024 21:05)  HR: 89 (25 Feb 2024 13:29) (89 - 100)  BP: 95/60 (25 Feb 2024 13:29) (95/60 - 105/55)  BP(mean): 70 (24 Feb 2024 21:05) (70 - 70)  RR: 17 (25 Feb 2024 13:29) (17 - 18)  SpO2: 96% (25 Feb 2024 13:29) (95% - 96%)    Parameters below as of 25 Feb 2024 13:29  Patient On (Oxygen Delivery Method): room air          MEDICATIONS:  MEDICATIONS  (STANDING):  atorvastatin 10 milliGRAM(s) Oral at bedtime  doxazosin 4 milliGRAM(s) Oral at bedtime  finasteride 5 milliGRAM(s) Oral daily  heparin   Injectable 5000 Unit(s) SubCutaneous every 8 hours  piperacillin/tazobactam IVPB.. 4.5 Gram(s) IV Intermittent every 8 hours  polyethylene glycol 3350 17 Gram(s) Oral every 12 hours  senna 2 Tablet(s) Oral at bedtime    MEDICATIONS  (PRN):  acetaminophen     Tablet .. 650 milliGRAM(s) Oral every 6 hours PRN Temp greater or equal to 38C (100.4F), Mild Pain (1 - 3)  melatonin 3 milliGRAM(s) Oral at bedtime PRN Sleep      ALLERGIES:  Allergies    No Known Allergies    Intolerances        LABS:                        9.0    26.98 )-----------( 396      ( 25 Feb 2024 08:33 )             28.1     02-25    132<L>  |  98  |  19  ----------------------------<  119<H>  3.3<L>   |  25  |  0.81    Ca    8.1<L>      25 Feb 2024 08:33  Phos  2.3     02-25  Mg     1.6     02-25        Urinalysis Basic - ( 25 Feb 2024 08:33 )    Color: x / Appearance: x / SG: x / pH: x  Gluc: 119 mg/dL / Ketone: x  / Bili: x / Urobili: x   Blood: x / Protein: x / Nitrite: x   Leuk Esterase: x / RBC: x / WBC x   Sq Epi: x / Non Sq Epi: x / Bacteria: x      CAPILLARY BLOOD GLUCOSE          RADIOLOGY & ADDITIONAL TESTS: Reviewed.

## 2024-02-25 NOTE — PROGRESS NOTE ADULT - PROBLEM SELECTOR PLAN 2
Patient meeting SIRS criteria (HR >90 and Leukocytosis. His leukocytosis is worsening from admission. Likely iso parapneumonic effusion.    Plan:  - Trend WBC  - Dc vanc given MRSA-  - Zosyn

## 2024-02-26 PROBLEM — Z87.438 PERSONAL HISTORY OF OTHER DISEASES OF MALE GENITAL ORGANS: Chronic | Status: ACTIVE | Noted: 2024-02-20

## 2024-02-26 PROBLEM — C34.90 MALIGNANT NEOPLASM OF UNSPECIFIED PART OF UNSPECIFIED BRONCHUS OR LUNG: Chronic | Status: ACTIVE | Noted: 2024-02-20

## 2024-02-26 LAB
ANION GAP SERPL CALC-SCNC: 10 MMOL/L — SIGNIFICANT CHANGE UP (ref 5–17)
BASOPHILS # BLD AUTO: 0.07 K/UL — SIGNIFICANT CHANGE UP (ref 0–0.2)
BASOPHILS NFR BLD AUTO: 0.3 % — SIGNIFICANT CHANGE UP (ref 0–2)
BUN SERPL-MCNC: 18 MG/DL — SIGNIFICANT CHANGE UP (ref 7–23)
CALCIUM SERPL-MCNC: 8 MG/DL — LOW (ref 8.4–10.5)
CHLORIDE SERPL-SCNC: 100 MMOL/L — SIGNIFICANT CHANGE UP (ref 96–108)
CO2 SERPL-SCNC: 24 MMOL/L — SIGNIFICANT CHANGE UP (ref 22–31)
CREAT SERPL-MCNC: 0.8 MG/DL — SIGNIFICANT CHANGE UP (ref 0.5–1.3)
EGFR: 85 ML/MIN/1.73M2 — SIGNIFICANT CHANGE UP
EOSINOPHIL # BLD AUTO: 0.32 K/UL — SIGNIFICANT CHANGE UP (ref 0–0.5)
EOSINOPHIL NFR BLD AUTO: 1.2 % — SIGNIFICANT CHANGE UP (ref 0–6)
GLUCOSE SERPL-MCNC: 100 MG/DL — HIGH (ref 70–99)
HCT VFR BLD CALC: 28.7 % — LOW (ref 39–50)
HGB BLD-MCNC: 9 G/DL — LOW (ref 13–17)
IMM GRANULOCYTES NFR BLD AUTO: 0.6 % — SIGNIFICANT CHANGE UP (ref 0–0.9)
LYMPHOCYTES # BLD AUTO: 1.76 K/UL — SIGNIFICANT CHANGE UP (ref 1–3.3)
LYMPHOCYTES # BLD AUTO: 6.5 % — LOW (ref 13–44)
MAGNESIUM SERPL-MCNC: 1.8 MG/DL — SIGNIFICANT CHANGE UP (ref 1.6–2.6)
MCHC RBC-ENTMCNC: 25.2 PG — LOW (ref 27–34)
MCHC RBC-ENTMCNC: 31.4 GM/DL — LOW (ref 32–36)
MCV RBC AUTO: 80.4 FL — SIGNIFICANT CHANGE UP (ref 80–100)
MONOCYTES # BLD AUTO: 1.6 K/UL — HIGH (ref 0–0.9)
MONOCYTES NFR BLD AUTO: 5.9 % — SIGNIFICANT CHANGE UP (ref 2–14)
NEUTROPHILS # BLD AUTO: 23.04 K/UL — HIGH (ref 1.8–7.4)
NEUTROPHILS NFR BLD AUTO: 85.5 % — HIGH (ref 43–77)
NRBC # BLD: 0 /100 WBCS — SIGNIFICANT CHANGE UP (ref 0–0)
PHOSPHATE SERPL-MCNC: 2.6 MG/DL — SIGNIFICANT CHANGE UP (ref 2.5–4.5)
PLATELET # BLD AUTO: 398 K/UL — SIGNIFICANT CHANGE UP (ref 150–400)
POTASSIUM SERPL-MCNC: 3.6 MMOL/L — SIGNIFICANT CHANGE UP (ref 3.5–5.3)
POTASSIUM SERPL-SCNC: 3.6 MMOL/L — SIGNIFICANT CHANGE UP (ref 3.5–5.3)
RBC # BLD: 3.57 M/UL — LOW (ref 4.2–5.8)
RBC # FLD: 15.6 % — HIGH (ref 10.3–14.5)
SODIUM SERPL-SCNC: 134 MMOL/L — LOW (ref 135–145)
VANCOMYCIN TROUGH SERPL-MCNC: 4 UG/ML — LOW (ref 10–20)
VANCOMYCIN TROUGH SERPL-MCNC: 4 UG/ML — LOW (ref 10–20)
WBC # BLD: 26.95 K/UL — HIGH (ref 3.8–10.5)
WBC # FLD AUTO: 26.95 K/UL — HIGH (ref 3.8–10.5)

## 2024-02-26 PROCEDURE — 93010 ELECTROCARDIOGRAM REPORT: CPT

## 2024-02-26 PROCEDURE — 99233 SBSQ HOSP IP/OBS HIGH 50: CPT | Mod: GC

## 2024-02-26 PROCEDURE — 99233 SBSQ HOSP IP/OBS HIGH 50: CPT

## 2024-02-26 PROCEDURE — 76604 US EXAM CHEST: CPT | Mod: 26

## 2024-02-26 RX ORDER — DORNASE ALFA 1 MG/ML
5 SOLUTION RESPIRATORY (INHALATION) EVERY 12 HOURS
Refills: 0 | Status: DISCONTINUED | OUTPATIENT
Start: 2024-02-26 | End: 2024-02-26

## 2024-02-26 RX ORDER — ACETAMINOPHEN 500 MG
2 TABLET ORAL
Qty: 0 | Refills: 0 | DISCHARGE
Start: 2024-02-26

## 2024-02-26 RX ORDER — POTASSIUM PHOSPHATE, MONOBASIC POTASSIUM PHOSPHATE, DIBASIC 236; 224 MG/ML; MG/ML
30 INJECTION, SOLUTION INTRAVENOUS ONCE
Refills: 0 | Status: DISCONTINUED | OUTPATIENT
Start: 2024-02-26 | End: 2024-02-26

## 2024-02-26 RX ORDER — ALTEPLASE 100 MG
10 KIT INTRAVENOUS EVERY 12 HOURS
Refills: 0 | Status: DISCONTINUED | OUTPATIENT
Start: 2024-02-26 | End: 2024-02-26

## 2024-02-26 RX ORDER — SENNA PLUS 8.6 MG/1
2 TABLET ORAL
Qty: 0 | Refills: 0 | DISCHARGE
Start: 2024-02-26

## 2024-02-26 RX ORDER — SODIUM,POTASSIUM PHOSPHATES 278-250MG
1 POWDER IN PACKET (EA) ORAL ONCE
Refills: 0 | Status: COMPLETED | OUTPATIENT
Start: 2024-02-26 | End: 2024-02-26

## 2024-02-26 RX ORDER — LANOLIN ALCOHOL/MO/W.PET/CERES
1 CREAM (GRAM) TOPICAL
Qty: 0 | Refills: 0 | DISCHARGE
Start: 2024-02-26

## 2024-02-26 RX ORDER — PANTOPRAZOLE SODIUM 20 MG/1
40 TABLET, DELAYED RELEASE ORAL
Refills: 0 | Status: DISCONTINUED | OUTPATIENT
Start: 2024-02-26 | End: 2024-02-29

## 2024-02-26 RX ORDER — HEPARIN SODIUM 5000 [USP'U]/ML
5000 INJECTION INTRAVENOUS; SUBCUTANEOUS
Qty: 0 | Refills: 0 | DISCHARGE
Start: 2024-02-26

## 2024-02-26 RX ORDER — PANTOPRAZOLE SODIUM 20 MG/1
1 TABLET, DELAYED RELEASE ORAL
Qty: 0 | Refills: 0 | DISCHARGE
Start: 2024-02-26

## 2024-02-26 RX ORDER — POLYETHYLENE GLYCOL 3350 17 G/17G
17 POWDER, FOR SOLUTION ORAL
Qty: 0 | Refills: 0 | DISCHARGE
Start: 2024-02-26

## 2024-02-26 RX ORDER — DOXAZOSIN MESYLATE 4 MG
1 TABLET ORAL
Qty: 0 | Refills: 0 | DISCHARGE
Start: 2024-02-26

## 2024-02-26 RX ADMIN — ATORVASTATIN CALCIUM 10 MILLIGRAM(S): 80 TABLET, FILM COATED ORAL at 21:32

## 2024-02-26 RX ADMIN — PIPERACILLIN AND TAZOBACTAM 25 GRAM(S): 4; .5 INJECTION, POWDER, LYOPHILIZED, FOR SOLUTION INTRAVENOUS at 19:19

## 2024-02-26 RX ADMIN — FINASTERIDE 5 MILLIGRAM(S): 5 TABLET, FILM COATED ORAL at 10:48

## 2024-02-26 RX ADMIN — Medication 650 MILLIGRAM(S): at 17:57

## 2024-02-26 RX ADMIN — ALTEPLASE 10 MILLIGRAM(S): KIT at 09:37

## 2024-02-26 RX ADMIN — Medication 650 MILLIGRAM(S): at 10:53

## 2024-02-26 RX ADMIN — Medication 1 PACKET(S): at 09:41

## 2024-02-26 RX ADMIN — HEPARIN SODIUM 5000 UNIT(S): 5000 INJECTION INTRAVENOUS; SUBCUTANEOUS at 13:05

## 2024-02-26 RX ADMIN — HEPARIN SODIUM 5000 UNIT(S): 5000 INJECTION INTRAVENOUS; SUBCUTANEOUS at 21:32

## 2024-02-26 RX ADMIN — POLYETHYLENE GLYCOL 3350 17 GRAM(S): 17 POWDER, FOR SOLUTION ORAL at 05:47

## 2024-02-26 RX ADMIN — HEPARIN SODIUM 5000 UNIT(S): 5000 INJECTION INTRAVENOUS; SUBCUTANEOUS at 05:47

## 2024-02-26 RX ADMIN — Medication 650 MILLIGRAM(S): at 11:48

## 2024-02-26 RX ADMIN — PIPERACILLIN AND TAZOBACTAM 25 GRAM(S): 4; .5 INJECTION, POWDER, LYOPHILIZED, FOR SOLUTION INTRAVENOUS at 03:49

## 2024-02-26 RX ADMIN — PANTOPRAZOLE SODIUM 40 MILLIGRAM(S): 20 TABLET, DELAYED RELEASE ORAL at 10:48

## 2024-02-26 RX ADMIN — Medication 4 MILLIGRAM(S): at 21:32

## 2024-02-26 RX ADMIN — PIPERACILLIN AND TAZOBACTAM 25 GRAM(S): 4; .5 INJECTION, POWDER, LYOPHILIZED, FOR SOLUTION INTRAVENOUS at 11:53

## 2024-02-26 RX ADMIN — DORNASE ALFA 5 MILLIGRAM(S): 1 SOLUTION RESPIRATORY (INHALATION) at 09:37

## 2024-02-26 NOTE — PROGRESS NOTE ADULT - ASSESSMENT
88M PMH lung cancer with pleural effusions s/p Pleurx placement prior to this hospitalization, BPH, neuropathy, and anxiety presenting with progressive weakness, dyspnea on exertion, and inability to care for self at home. Admitted to [facility] for further evaluation and management of Failure to Thrive. Course c/b urinary retention needed ruth placement on 2/22 and parapnemonic effusion. 88M PMH lung cancer with pleural effusions s/p Pleurx placement prior to this hospitalization, BPH, neuropathy, and anxiety presenting with progressive weakness, dyspnea on exertion, and inability to care for self at home. Admitted to [facility] for further evaluation and management of Failure to Thrive. Course c/b urinary retention needed ruth placement on 2/22 and parapneumonic effusion s/p MIST II protocol. Now recommended to [facility] by PT.

## 2024-02-26 NOTE — PROGRESS NOTE ADULT - PROBLEM SELECTOR PLAN 9
8 Hgb on presentation 9.8, last hgb 11.7 1/2024. Pt denies hematuria, hematemesis, hemoptysis, or melena. +constipation. UA +RBC and large blood. Likely AOCD and nutritional deficiencies i/s/o progressing lung cancer and significant weight loss.  Iron studies noted  Plan:  - maintain active T&S  - Transfuse if Hgb <7

## 2024-02-26 NOTE — PROGRESS NOTE ADULT - PROBLEM SELECTOR PLAN 3
Patient with Hx of BPH on medications outpatient. BS today showed 1.7L of urine in bladder. Trial of straight cath was unsuccessful therefore urology was called for Ruth placement. 1.5L was removed from bladder.   Urine output appropriate after the large amount of urine extracted during ruth placement    Plan:  - Continue Ruth for at least 3 days per urology recs and TOV afterwards. Patient with Hx of BPH on medications outpatient. BS today showed 1.7L of urine in bladder. Trial of straight cath was unsuccessful therefore urology was called for Ruth placement. 1.5L was removed from bladder.   Urine output appropriate after the large amount of urine extracted during ruth placement    Plan:  - Continue Ruth for at least 3 days per urology recs  - TOV tomorrow

## 2024-02-26 NOTE — PROGRESS NOTE ADULT - ASSESSMENT
88M PMH squamous cell lung cancer, BPH, peripheral neuropathy, and anxiety presenting with progressive weakness, dyspnea on exertion, fatigue, and weight loss x2 weeks. Pulmonary consulted for known MPE and IPC.     #Malignant Pleural Effusion s/p IPC placement   #SCC of the Lung, Stage IIIA     Presenting with failure to thrive in the setting of known malignancy which has yet to be treated. Pulmonary consulted for known MPE s/p IPC placement by Dr. Lawrence. Currently on room air saturating well. Has leukocytosis, but otherwise no focal infectious symptoms. On bedside US, right sided small pleural effusion with visible septations. Per Dr. Lawrence who placed pleurx, effusion was septated when pleurx was initially placed one month ago. Initially unable to drain pleurx, instilled 5 mg tPA and now draining freely. Minimal output and cell count shows 95% neutrophils. Glucose 14 and LDH >2500, concerning for complicated parapneumonic effusion. Pt now s/p MIST2 protocol, effusion drained and pleurx disconnected from chest tube. Will resume regular drainage schedule based off US assessment of pleural space.    Recommendations:  -c/w zosyn, can transition to levaquin on discharge - will need to complete 2 weeks total abx  -CT chest/abd/pelvis to assess for progression of disease, if not will discuss IR biopsy   -pulm will reassess right sided complicated parapneumonic effusion    Case s/e/d with Dr. Stewart

## 2024-02-26 NOTE — PROGRESS NOTE ADULT - PROBLEM SELECTOR PLAN 4
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 Follows with Dr Lawrence and Dr Fletcher (oncologist). s/p bronchoscopy, pleurx placement, and thoracentesis outpatient. EBUS FNA bx and BAL 11/2023 +malignant cells. RLL bx +SCC. Pending radiation treatment, last seen 12/2023 and was determined to be a good candidate for chemoradiation at that time however did not receive tx due to large pleural effusion. Now p/w FTT, progressive dyspnea, fatigue, weight loss, night sweats.    Plan:  - palliative consulted to aid in decision about possible chemo/radiation therapy  - Pulm on board as well, pleural fluid analysis sent, pending results  - encourage PO intake. 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 Follows with Dr Lawrence and Dr Fletcher (oncologist). s/p bronchoscopy, pleurx placement, and thoracentesis outpatient. EBUS FNA bx and BAL 11/2023 +malignant cells. RLL bx +SCC. Pending radiation treatment, last seen 12/2023 and was determined to be a good candidate for chemoradiation at that time however did not receive tx due to large pleural effusion. Now p/w FTT, progressive dyspnea, fatigue, weight loss, night sweats.    Plan:  - palliative consulted to aid in decision about possible chemo/radiation therapy  - Pulm on board, as above   - f/u outpaitent with pulm, Palliative, PCP and Heme/onc  - encourage PO intake.

## 2024-02-26 NOTE — CHART NOTE - NSCHARTNOTEFT_GEN_A_CORE
Admitting Diagnosis:   Patient is a 88y old  Male who presents with a chief complaint of Failure to thrive (26 Feb 2024 06:26)      PAST MEDICAL & SURGICAL HISTORY:  Lung cancer      History of BPH      S/P rotator cuff repair      H/O carpal tunnel repair          Current Nutrition Order: Regular, Ensure Enlive 2x/day        PO Intake: Good (%) [   ]  Fair (50-75%) [   ] Poor (<25%) [   ] - 25-50%    GI Issues: Endorses abdominal pain, reflux, constipation- last BM 2/22 per EMR    Skin Integrity: No pressure injuries or edema documented, Jose Alberto score 16    Labs:   02-26    134<L>  |  100  |  18  ----------------------------<  100<H>  3.6   |  24  |  0.80    Ca    8.0<L>      26 Feb 2024 05:30  Phos  2.6     02-26  Mg     1.8     02-26      CAPILLARY BLOOD GLUCOSE        Medications:  MEDICATIONS  (STANDING):  atorvastatin 10 milliGRAM(s) Oral at bedtime  doxazosin 4 milliGRAM(s) Oral at bedtime  finasteride 5 milliGRAM(s) Oral daily  heparin   Injectable 5000 Unit(s) SubCutaneous every 8 hours  pantoprazole    Tablet 40 milliGRAM(s) Oral before breakfast  piperacillin/tazobactam IVPB.. 4.5 Gram(s) IV Intermittent every 8 hours  polyethylene glycol 3350 17 Gram(s) Oral every 12 hours  senna 2 Tablet(s) Oral at bedtime    MEDICATIONS  (PRN):  acetaminophen     Tablet .. 650 milliGRAM(s) Oral every 6 hours PRN Temp greater or equal to 38C (100.4F), Mild Pain (1 - 3)  melatonin 3 milliGRAM(s) Oral at bedtime PRN Sleep      Anthropometrics:  Dosing height: 67in  Dosing weight: 115#  BMI: 18  IBW: 148#  %IBW: 78%    Weight Change: no new weights since admit    Nutrition Focused Physical Exam: Completed [ x ]  Not Pertinent [   ]  >>see dietitian nutrition risk notification 2/21     Estimated energy needs:   Weight used for calculations	IBW  Estimated Energy Needs Weight (lbs)	148 lb  Estimated Energy Needs Weight (kg)	67.1 kg  Estimated Energy Needs From (divian/kg)	25  Estimated Energy Needs To (divina/kg)	30  Estimated Energy Needs Calculated From (divina/kg)	1677  Estimated Energy Needs Calculated To (divina/kg)	2013  Weight used for calculations	IBW  Estimated Protein Needs Weight (lbs)	148 lb  Estimated Protein Needs Weight (kg)	67.1 kg  Estimated Protein Needs From (g/kg)	1  Estimated Protein Needs To (g/kg)	1.25  Estimated Protein Needs Calculated From (g/kg)	67.1  Estimated Protein Needs Calculated To (g/kg)	83.87  Estimated Fluid Needs Weight (lbs)	148 lb  Estimated Fluid Needs Weight (kg)	67.1 kg  Estimated Fluid Needs From (ml/kg)	25  Estimated Fluid Needs To (ml/kg)	30  Estimated Fluid Needs Calculated From (ml/kg)	1677  Estimated Fluid Needs Calculated To (ml/kg)	2013  Other Calculations	Based on Standards of Care pt below % IBW (78%) thus ideal body weight used for all calculations (148#). Needs adjusted for advanced age, cancer, malnutrition.    Subjective:   88M PMH squamous cell lung cancer, BPH, peripheral neuropathy, and anxiety presenting with progressive weakness, dyspnea on exertion, fatigue, and weight loss x2 weeks. Pt states he was diagnosed with lung cancer around 4 months ago (10/2023) after presenting to his PCP with hemoptysis. Follows with Dr Lawrence outpatient, who he most recently saw on 1/17/24, thoracentesis was done and pleurx was placed for large R sided pleural effusion. During that visit, pt also complained of fatigue and loss of appetite. However, reports that these symptoms have rapidly progressed over the past 2 weeks, during which he lost almost 20 pounds. Pt lives alone in an apartment in [place], without any HHA, however has home PT. He called his PCP Dr Hernandez on Monday 2/19/24 due to these sx, and was instructed to present to ER. ROS +urinary frequency, nocturia, constipation, night sweats, and productive cough with brown/green phlegm. Reports having night sweats for >1 year, noticed that he wets several bedsheets. Denies pain on urination and states polyuria is due to his prostate issues. He was reportedly supposed to start radiation therapy for his lung cancer, however has not received any treatment thus far. Follows with rad onc Dr. Hubbard at [facility].     Pt seen at bedside for follow up assessment. On Regular diet with Ensure Enlive 2x/day. Pt endorses fair appetite, however states he is experiencing abdominal pain and reflux after eating. Observed 25-50% breakfast tray consumed at bedside. Endorses good acceptance of Ensure supplements, states he drank two this AM prior to breakfast. Educated pt on drinking supplements between/after meals to optimize PO intake of food at meal times- pt expressed understanding. Noted with constipation, last BM 2/22 per EMR. Pt on protonix, miralax, and senna. Labs: Na 134, phos previously low now WDL, glucose 100-119 x 24 hours. Encouraged continued PO intake as tolerated, reviewed diet recommendations for acid reflux. Pt expressed understanding. RD to remain available for additional nutrition interventions as needed.     Previous Nutrition Diagnosis: Severe protein calorie malnutrition in context of acute illness related to decreased appetite as evidenced by 15% weight loss x 2 weeks, suspected meeting <50% nutrition needs >5 days    Active [ x ]  Resolved [   ]    Goal: Pt to meet at least 75% of nutritional needs consistently     Recommendations:  1. Continue Regular diet, recommend Ensure Max Protein 2x/day (150 kcal, 30 g protein per serving)  2. Encourage and monitor PO intake, honor preferences as able   3. Monitor labs, wt trends, GI function, and skin integrity   4. Pain and bowel regimen per team   5. RD to remain available for additional nutrition interventions and diet edu as needed       Risk Level: High [ x ] Moderate [   ] Low [   ]

## 2024-02-26 NOTE — PROGRESS NOTE ADULT - PROBLEM SELECTOR PLAN 2
Patient meeting SIRS criteria (HR >90 and Leukocytosis. His leukocytosis is worsening from admission. Likely iso parapneumonic effusion.    Plan:  - Trend WBC  - Dc vanc given MRSA-  - Zosyn Patient meeting SIRS criteria (HR >90 and Leukocytosis. His leukocytosis is worsening from admission. Likely iso parapneumonic effusion.  s/p MIST II protocol and Vancomycin (d/c'd 2/25)     Plan:  - Trend WBC  - Continue with IV Zosyn while inpatient, pending final recs for outpatient abx per pulm.

## 2024-02-26 NOTE — PROGRESS NOTE ADULT - SUBJECTIVE AND OBJECTIVE BOX
OVERNIGHT EVENTS: KAMINI    SUBJECTIVE:  The patient was seen and examined at the bedside this AM. The patient denies any acute concerns. He states that he had a BM yesterday.     VITAL SIGNS:  Vital Signs Last 24 Hrs  T(C): 37.1 (26 Feb 2024 12:03), Max: 37.4 (25 Feb 2024 20:28)  T(F): 98.8 (26 Feb 2024 12:03), Max: 99.3 (25 Feb 2024 20:28)  HR: 90 (26 Feb 2024 06:21) (90 - 96)  BP: 103/60 (26 Feb 2024 12:03) (100/62 - 106/59)  BP(mean): --  RR: 17 (26 Feb 2024 12:03) (17 - 18)  SpO2: 96% (26 Feb 2024 12:03) (95% - 96%)    Parameters below as of 26 Feb 2024 12:03  Patient On (Oxygen Delivery Method): room air        PHYSICAL EXAM:  General: cachectic, elderly gentleman, NAD; speaking in full sentences  HEENT: PERRL; EOMI; MMM  Neck: supple; no JVD  Cardiac: RRR; +S1/S2  Pulm: CTA B/L; no W/R/R  GI: soft, NT/ND, +BS  : Gerber in place  Extremities: WWP; no edema, clubbing or cyanosis  Vasc: 2+ radial, DP pulses B/L  Neuro: AAOx3; no focal deficits    MEDICATIONS:  MEDICATIONS  (STANDING):  atorvastatin 10 milliGRAM(s) Oral at bedtime  doxazosin 4 milliGRAM(s) Oral at bedtime  finasteride 5 milliGRAM(s) Oral daily  heparin   Injectable 5000 Unit(s) SubCutaneous every 8 hours  pantoprazole    Tablet 40 milliGRAM(s) Oral before breakfast  piperacillin/tazobactam IVPB.. 4.5 Gram(s) IV Intermittent every 8 hours  polyethylene glycol 3350 17 Gram(s) Oral every 12 hours  senna 2 Tablet(s) Oral at bedtime    MEDICATIONS  (PRN):  acetaminophen     Tablet .. 650 milliGRAM(s) Oral every 6 hours PRN Temp greater or equal to 38C (100.4F), Mild Pain (1 - 3)  melatonin 3 milliGRAM(s) Oral at bedtime PRN Sleep      ALLERGIES:  Allergies    No Known Allergies    Intolerances        LABS:                        9.0    26.95 )-----------( 398      ( 26 Feb 2024 05:30 )             28.7     02-26    134<L>  |  100  |  18  ----------------------------<  100<H>  3.6   |  24  |  0.80    Ca    8.0<L>      26 Feb 2024 05:30  Phos  2.6     02-26  Mg     1.8     02-26          RADIOLOGY & ADDITIONAL TESTS: Reviewed.

## 2024-02-26 NOTE — PROGRESS NOTE ADULT - PROBLEM SELECTOR PLAN 10
Last seen urologist prior to COVID, per [facility]IE was seen in 2017. Takes terazosin 5mg and finasteride 5mg at home. Reports chronic nocturia and polyuria that has not particularly worsened.  - c/w home meds  - TOV in 3 days given acute urinary retention Last seen urologist prior to COVID, per HIE was seen in 2017. Takes terazosin 5mg and finasteride 5mg at home. Reports chronic nocturia and polyuria that has not particularly worsened.  - c/w home meds  - TOV tomorrow

## 2024-02-26 NOTE — PROGRESS NOTE ADULT - PROBLEM SELECTOR PLAN 5
pulm following. broad abx. dornase. cultures ngtd. fluisd c/w parapnemonic effusion.    - Drain Plurx as scheduled  - On MIST II protocol  - Pending Palliative conversation for further care pulm following. broad abx. dornase. cultures ngtd. fluisd c/w parapnemonic effusion.    - As above

## 2024-02-26 NOTE — PROGRESS NOTE ADULT - SUBJECTIVE AND OBJECTIVE BOX
PULMONARY CONSULT SERVICE FOLLOW-UP NOTE    INTERVAL HPI:  Reviewed chart and overnight events; patient seen and examined at bedside. Pt doing well, received final dose of tPA/dornase this AM. Pleurx disconnected from chest tube. Continues on abx, CT C/A/P to decide on need for biopsy.    MEDICATIONS:  Pulmonary:    Antimicrobials:  piperacillin/tazobactam IVPB.. 4.5 Gram(s) IV Intermittent every 8 hours    Anticoagulants:  heparin   Injectable 5000 Unit(s) SubCutaneous every 8 hours    Cardiac:  doxazosin 4 milliGRAM(s) Oral at bedtime      Allergies    No Known Allergies    Intolerances        Vital Signs Last 24 Hrs  T(C): 37.1 (26 Feb 2024 12:03), Max: 37.4 (25 Feb 2024 20:28)  T(F): 98.8 (26 Feb 2024 12:03), Max: 99.3 (25 Feb 2024 20:28)  HR: 90 (26 Feb 2024 06:21) (90 - 96)  BP: 103/60 (26 Feb 2024 12:03) (100/62 - 106/59)  BP(mean): --  RR: 17 (26 Feb 2024 12:03) (17 - 18)  SpO2: 96% (26 Feb 2024 12:03) (95% - 96%)    Parameters below as of 26 Feb 2024 12:03  Patient On (Oxygen Delivery Method): room air        02-25 @ 07:01 - 02-26 @ 07:00  --------------------------------------------------------  IN: 0 mL / OUT: 2160 mL / NET: -2160 mL    02-26 @ 07:01  -  02-26 @ 18:03  --------------------------------------------------------  IN: 0 mL / OUT: 700 mL / NET: -700 mL          PHYSICAL EXAM:  Constitutional: well-appearing  HEENT: NC/AT; PERRL, anicteric sclera; MMM  Neck: supple  Cardiovascular: +S1/S2, RRR  Respiratory: CTA B/L; no W/R/R  Gastrointestinal: soft, NT/ND  Extremities: WWP; no edema, clubbing or cyanosis  Vascular: 2+ radial pulses B/L  Neurological: awake and alert; SCHMIDT    LABS:      CBC Full  -  ( 26 Feb 2024 05:30 )  WBC Count : 26.95 K/uL  RBC Count : 3.57 M/uL  Hemoglobin : 9.0 g/dL  Hematocrit : 28.7 %  Platelet Count - Automated : 398 K/uL  Mean Cell Volume : 80.4 fl  Mean Cell Hemoglobin : 25.2 pg  Mean Cell Hemoglobin Concentration : 31.4 gm/dL  Auto Neutrophil # : 23.04 K/uL  Auto Lymphocyte # : 1.76 K/uL  Auto Monocyte # : 1.60 K/uL  Auto Eosinophil # : 0.32 K/uL  Auto Basophil # : 0.07 K/uL  Auto Neutrophil % : 85.5 %  Auto Lymphocyte % : 6.5 %  Auto Monocyte % : 5.9 %  Auto Eosinophil % : 1.2 %  Auto Basophil % : 0.3 %    02-26    134<L>  |  100  |  18  ----------------------------<  100<H>  3.6   |  24  |  0.80    Ca    8.0<L>      26 Feb 2024 05:30  Phos  2.6     02-26  Mg     1.8     02-26            Urinalysis Basic - ( 26 Feb 2024 05:30 )    Color: x / Appearance: x / SG: x / pH: x  Gluc: 100 mg/dL / Ketone: x  / Bili: x / Urobili: x   Blood: x / Protein: x / Nitrite: x   Leuk Esterase: x / RBC: x / WBC x   Sq Epi: x / Non Sq Epi: x / Bacteria: x                RADIOLOGY & ADDITIONAL STUDIES:

## 2024-02-26 NOTE — CHART NOTE - NSCHARTNOTEFT_GEN_A_CORE
Final dose of tPA/dornase instilled via chest tube as per MIST2 protocol. Clamped for one hour and then opened. Tolerated well.

## 2024-02-27 DIAGNOSIS — A41.9 SEPSIS, UNSPECIFIED ORGANISM: ICD-10-CM

## 2024-02-27 LAB
ANION GAP SERPL CALC-SCNC: 6 MMOL/L — SIGNIFICANT CHANGE UP (ref 5–17)
BUN SERPL-MCNC: 22 MG/DL — SIGNIFICANT CHANGE UP (ref 7–23)
CALCIUM SERPL-MCNC: 8.2 MG/DL — LOW (ref 8.4–10.5)
CHLORIDE SERPL-SCNC: 96 MMOL/L — SIGNIFICANT CHANGE UP (ref 96–108)
CO2 SERPL-SCNC: 28 MMOL/L — SIGNIFICANT CHANGE UP (ref 22–31)
CREAT SERPL-MCNC: 0.86 MG/DL — SIGNIFICANT CHANGE UP (ref 0.5–1.3)
EGFR: 83 ML/MIN/1.73M2 — SIGNIFICANT CHANGE UP
GLUCOSE SERPL-MCNC: 100 MG/DL — HIGH (ref 70–99)
HCT VFR BLD CALC: 26.4 % — LOW (ref 39–50)
HGB BLD-MCNC: 8.6 G/DL — LOW (ref 13–17)
MAGNESIUM SERPL-MCNC: 1.8 MG/DL — SIGNIFICANT CHANGE UP (ref 1.6–2.6)
MCHC RBC-ENTMCNC: 25 PG — LOW (ref 27–34)
MCHC RBC-ENTMCNC: 32.6 GM/DL — SIGNIFICANT CHANGE UP (ref 32–36)
MCV RBC AUTO: 76.7 FL — LOW (ref 80–100)
NRBC # BLD: 0 /100 WBCS — SIGNIFICANT CHANGE UP (ref 0–0)
PHOSPHATE SERPL-MCNC: 1.9 MG/DL — LOW (ref 2.5–4.5)
PLATELET # BLD AUTO: 403 K/UL — HIGH (ref 150–400)
POTASSIUM SERPL-MCNC: 3.5 MMOL/L — SIGNIFICANT CHANGE UP (ref 3.5–5.3)
POTASSIUM SERPL-SCNC: 3.5 MMOL/L — SIGNIFICANT CHANGE UP (ref 3.5–5.3)
RBC # BLD: 3.44 M/UL — LOW (ref 4.2–5.8)
RBC # FLD: 16.1 % — HIGH (ref 10.3–14.5)
SODIUM SERPL-SCNC: 130 MMOL/L — LOW (ref 135–145)
WBC # BLD: 27.69 K/UL — HIGH (ref 3.8–10.5)
WBC # FLD AUTO: 27.69 K/UL — HIGH (ref 3.8–10.5)

## 2024-02-27 PROCEDURE — 99233 SBSQ HOSP IP/OBS HIGH 50: CPT | Mod: GC

## 2024-02-27 PROCEDURE — 71260 CT THORAX DX C+: CPT | Mod: 26

## 2024-02-27 PROCEDURE — 74177 CT ABD & PELVIS W/CONTRAST: CPT | Mod: 26

## 2024-02-27 PROCEDURE — 93010 ELECTROCARDIOGRAM REPORT: CPT

## 2024-02-27 RX ORDER — IOHEXOL 300 MG/ML
30 INJECTION, SOLUTION INTRAVENOUS ONCE
Refills: 0 | Status: COMPLETED | OUTPATIENT
Start: 2024-02-27 | End: 2024-02-27

## 2024-02-27 RX ORDER — POTASSIUM PHOSPHATE, MONOBASIC POTASSIUM PHOSPHATE, DIBASIC 236; 224 MG/ML; MG/ML
30 INJECTION, SOLUTION INTRAVENOUS ONCE
Refills: 0 | Status: DISCONTINUED | OUTPATIENT
Start: 2024-02-27 | End: 2024-02-27

## 2024-02-27 RX ORDER — ACETAMINOPHEN 500 MG
650 TABLET ORAL ONCE
Refills: 0 | Status: COMPLETED | OUTPATIENT
Start: 2024-02-27 | End: 2024-02-27

## 2024-02-27 RX ORDER — POTASSIUM PHOSPHATE, MONOBASIC POTASSIUM PHOSPHATE, DIBASIC 236; 224 MG/ML; MG/ML
30 INJECTION, SOLUTION INTRAVENOUS ONCE
Refills: 0 | Status: COMPLETED | OUTPATIENT
Start: 2024-02-27 | End: 2024-02-27

## 2024-02-27 RX ADMIN — Medication 650 MILLIGRAM(S): at 21:35

## 2024-02-27 RX ADMIN — Medication 4 MILLIGRAM(S): at 21:35

## 2024-02-27 RX ADMIN — ATORVASTATIN CALCIUM 10 MILLIGRAM(S): 80 TABLET, FILM COATED ORAL at 21:36

## 2024-02-27 RX ADMIN — PIPERACILLIN AND TAZOBACTAM 25 GRAM(S): 4; .5 INJECTION, POWDER, LYOPHILIZED, FOR SOLUTION INTRAVENOUS at 12:09

## 2024-02-27 RX ADMIN — Medication 650 MILLIGRAM(S): at 08:38

## 2024-02-27 RX ADMIN — Medication 650 MILLIGRAM(S): at 22:35

## 2024-02-27 RX ADMIN — Medication 650 MILLIGRAM(S): at 01:18

## 2024-02-27 RX ADMIN — FINASTERIDE 5 MILLIGRAM(S): 5 TABLET, FILM COATED ORAL at 11:10

## 2024-02-27 RX ADMIN — HEPARIN SODIUM 5000 UNIT(S): 5000 INJECTION INTRAVENOUS; SUBCUTANEOUS at 12:09

## 2024-02-27 RX ADMIN — PIPERACILLIN AND TAZOBACTAM 25 GRAM(S): 4; .5 INJECTION, POWDER, LYOPHILIZED, FOR SOLUTION INTRAVENOUS at 19:32

## 2024-02-27 RX ADMIN — PANTOPRAZOLE SODIUM 40 MILLIGRAM(S): 20 TABLET, DELAYED RELEASE ORAL at 06:04

## 2024-02-27 RX ADMIN — Medication 650 MILLIGRAM(S): at 12:26

## 2024-02-27 RX ADMIN — HEPARIN SODIUM 5000 UNIT(S): 5000 INJECTION INTRAVENOUS; SUBCUTANEOUS at 06:04

## 2024-02-27 RX ADMIN — Medication 650 MILLIGRAM(S): at 09:38

## 2024-02-27 RX ADMIN — IOHEXOL 30 MILLILITER(S): 300 INJECTION, SOLUTION INTRAVENOUS at 09:53

## 2024-02-27 RX ADMIN — PIPERACILLIN AND TAZOBACTAM 25 GRAM(S): 4; .5 INJECTION, POWDER, LYOPHILIZED, FOR SOLUTION INTRAVENOUS at 03:06

## 2024-02-27 RX ADMIN — POLYETHYLENE GLYCOL 3350 17 GRAM(S): 17 POWDER, FOR SOLUTION ORAL at 17:27

## 2024-02-27 RX ADMIN — Medication 650 MILLIGRAM(S): at 15:28

## 2024-02-27 RX ADMIN — Medication 650 MILLIGRAM(S): at 13:26

## 2024-02-27 RX ADMIN — POTASSIUM PHOSPHATE, MONOBASIC POTASSIUM PHOSPHATE, DIBASIC 83.33 MILLIMOLE(S): 236; 224 INJECTION, SOLUTION INTRAVENOUS at 11:12

## 2024-02-27 RX ADMIN — HEPARIN SODIUM 5000 UNIT(S): 5000 INJECTION INTRAVENOUS; SUBCUTANEOUS at 21:35

## 2024-02-27 RX ADMIN — Medication 650 MILLIGRAM(S): at 00:18

## 2024-02-27 RX ADMIN — Medication 650 MILLIGRAM(S): at 16:28

## 2024-02-27 NOTE — PROGRESS NOTE ADULT - PROBLEM SELECTOR PLAN 3
Patient with Hx of BPH on medications outpatient. BS today showed 1.7L of urine in bladder. Trial of straight cath was unsuccessful therefore urology was called for Ruth placement. 1.5L was removed from bladder.   Urine output appropriate after the large amount of urine extracted during ruth placement    Plan:  - Continue Ruth for at least 3 days per urology recs  - TOV tomorrow Patient with Hx of BPH on medications outpatient. BS today showed 1.7L of urine in bladder. Trial of straight cath was unsuccessful therefore urology was called for Ruth placement. 1.5L was removed from bladder.   Urine output appropriate after the large amount of urine extracted during ruth placement    Plan:  - Continue Ruth for at least 3 days per urology recs  - TOV at [facility]

## 2024-02-27 NOTE — PROGRESS NOTE ADULT - SUBJECTIVE AND OBJECTIVE BOX
OVERNIGHT EVENTS: KAMINI    SUBJECTIVE:  The patient was seen and examined at the bedside. He states that he is feeling well and has no complaints today.     VITAL SIGNS:  Vital Signs Last 24 Hrs  T(C): 36.7 (27 Feb 2024 12:53), Max: 37.2 (27 Feb 2024 06:21)  T(F): 98 (27 Feb 2024 12:53), Max: 98.9 (27 Feb 2024 06:21)  HR: 89 (27 Feb 2024 12:53) (89 - 104)  BP: 111/56 (27 Feb 2024 12:53) (106/54 - 113/53)  BP(mean): --  RR: 16 (27 Feb 2024 12:53) (16 - 18)  SpO2: 94% (27 Feb 2024 12:53) (94% - 96%)    Parameters below as of 27 Feb 2024 12:53  Patient On (Oxygen Delivery Method): room air        PHYSICAL EXAM:  General: NAD; speaking in full sentences  HEENT: PERRL; EOMI; MMM  Neck: supple; no JVD  Cardiac: Irregular rythm, not tachycardic; +S1/S2, no murmur   Pulm: CTA B/L; no W/R/R  Skin: Right chest wound c/d/i after device removal   GI: soft, NT/ND, +BS  Extremities: WWP; no edema, clubbing or cyanosis  Vasc: 2+ radial, DP pulses B/L  Neuro: AAOx3; no focal deficits    MEDICATIONS:  MEDICATIONS  (STANDING):  atorvastatin 10 milliGRAM(s) Oral at bedtime  doxazosin 4 milliGRAM(s) Oral at bedtime  finasteride 5 milliGRAM(s) Oral daily  heparin   Injectable 5000 Unit(s) SubCutaneous every 8 hours  pantoprazole    Tablet 40 milliGRAM(s) Oral before breakfast  piperacillin/tazobactam IVPB.. 4.5 Gram(s) IV Intermittent every 8 hours  polyethylene glycol 3350 17 Gram(s) Oral every 12 hours  senna 2 Tablet(s) Oral at bedtime    MEDICATIONS  (PRN):  acetaminophen     Tablet .. 650 milliGRAM(s) Oral every 6 hours PRN Temp greater or equal to 38C (100.4F), Mild Pain (1 - 3)  melatonin 3 milliGRAM(s) Oral at bedtime PRN Sleep      ALLERGIES:  Allergies    No Known Allergies    Intolerances        LABS:                        8.6    27.69 )-----------( 403      ( 27 Feb 2024 05:30 )             26.4     02-27    130<L>  |  96  |  22  ----------------------------<  100<H>  3.5   |  28  |  0.86    Ca    8.2<L>      27 Feb 2024 05:30  Phos  1.9     02-27  Mg     1.8     02-27          RADIOLOGY & ADDITIONAL TESTS: Reviewed.

## 2024-02-27 NOTE — PROGRESS NOTE ADULT - PROBLEM SELECTOR PLAN 8
BMI 18, >20 pound weight loss over past 2 weeks.  - nutrition consulted and recommended regular diet with Ensure Enlive twice per day Hgb on presentation 9.8, last hgb 11.7 1/2024. Pt denies hematuria, hematemesis, hemoptysis, or melena. +constipation. UA +RBC and large blood. Likely AOCD and nutritional deficiencies i/s/o progressing lung cancer and significant weight loss.  Iron studies noted  Plan:  - maintain active T&S  - Transfuse if Hgb <7

## 2024-02-27 NOTE — PROGRESS NOTE ADULT - PROBLEM SELECTOR PLAN 2
Patient meeting SIRS criteria (HR >90 and Leukocytosis. His leukocytosis is worsening from admission. Likely iso parapneumonic effusion.  s/p MIST II protocol and Vancomycin (d/c'd 2/25)     Plan:  - Trend WBC  - Continue with IV Zosyn while inpatient, pending final recs for outpatient abx per pulm. Patient meeting SIRS criteria (HR >90 and Leukocytosis. His leukocytosis is worsening from admission. Likely iso parapneumonic effusion.  s/p MIST II protocol and Vancomycin (d/c'd 2/25)   Plan:  - Trend WBC  - Continue with IV Zosyn while inpatient,  - Plan for Augmentin on discharge as there is low likelihood of pseudomonas infection at this time.

## 2024-02-27 NOTE — PROGRESS NOTE ADULT - PROBLEM SELECTOR PLAN 4
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 Follows with Dr Lawrence and Dr Fletcher (oncologist). s/p bronchoscopy, pleurx placement, and thoracentesis outpatient. EBUS FNA bx and BAL 11/2023 +malignant cells. RLL bx +SCC. Pending radiation treatment, last seen 12/2023 and was determined to be a good candidate for chemoradiation at that time however did not receive tx due to large pleural effusion. Now p/w FTT, progressive dyspnea, fatigue, weight loss, night sweats.    Plan:  - palliative consulted to aid in decision about possible chemo/radiation therapy  - Pulm on board, as above   - f/u outpaitent with pulm, Palliative, PCP and Heme/onc  - encourage PO intake. 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 Follows with Dr Lawrence and Dr Fletcher (oncologist). s/p bronchoscopy, pleurx placement, and thoracentesis outpatient. EBUS FNA bx and BAL 11/2023 +malignant cells. RLL bx +SCC. Pending radiation treatment, last seen 12/2023 and was determined to be a good candidate for chemoradiation at that time however did not receive tx due to large pleural effusion. Now p/w FTT, progressive dyspnea, fatigue, weight loss, night sweats.    Plan:  - palliative consulted to aid in decision about possible chemo/radiation therapy  - Pulm following, pending final recs  - f/u outpaitent with pulm, Palliative, PCP and Heme/onc  - encourage PO intake.

## 2024-02-27 NOTE — PROGRESS NOTE ADULT - PROBLEM SELECTOR PLAN 6
2/2 parapneumonic effusion, r/o empyema IMPROVING  The patient was complaining of constipation overnight. Started on Miralax, however, per nursing, the patient was given senna and had a large bowel movement, therefore, Miralax was held.   He is complaining of abdominal discomfort at this time, improved from admission  Abdominal xray with stool burden.  Plan:  - c/w senna at night, patient refused overnight and simethicone was given  - Miralax PRN

## 2024-02-27 NOTE — PROGRESS NOTE ADULT - PROBLEM SELECTOR PLAN 7
IMPROVING  The patient was complaining of constipation overnight. Started on Miralax, however, per nursing, the patient was given senna and had a large bowel movement, therefore, Miralax was held.   He is complaining of abdominal discomfort at this time, improved from admission  Abdominal xray with stool burden.  Plan:  - c/w senna at night, patient refused overnight and simethicone was given  - Miralax PRN BMI 18, >20 pound weight loss over past 2 weeks.  - nutrition consulted and recommended regular diet with Ensure Enlive twice per day

## 2024-02-27 NOTE — PROGRESS NOTE ADULT - ASSESSMENT
88M PMH lung cancer with pleural effusions s/p Pleurx placement prior to this hospitalization, BPH, neuropathy, and anxiety presenting with progressive weakness, dyspnea on exertion, and inability to care for self at home. Admitted to [facility] for further evaluation and management of Failure to Thrive. Course c/b urinary retention needed ruth placement on 2/22 and parapneumonic effusion s/p MIST II protocol. Now recommended to [facility] by PT.

## 2024-02-27 NOTE — PROGRESS NOTE ADULT - PROBLEM SELECTOR PLAN 9
Hgb on presentation 9.8, last hgb 11.7 1/2024. Pt denies hematuria, hematemesis, hemoptysis, or melena. +constipation. UA +RBC and large blood. Likely AOCD and nutritional deficiencies i/s/o progressing lung cancer and significant weight loss.  Iron studies noted  Plan:  - maintain active T&S  - Transfuse if Hgb <7 Last seen urologist prior to COVID, per HIE was seen in 2017. Takes terazosin 5mg and finasteride 5mg at home. Reports chronic nocturia and polyuria that has not particularly worsened.  - c/w home meds  - TOV tomorrow

## 2024-02-27 NOTE — PROGRESS NOTE ADULT - PROBLEM SELECTOR PLAN 1
I/s/o multiple medical problems including known malignancy, chronic gait instability. Progressive weight loss since lung cancer diagnosis, however reports >20 pound weight loss over past 2 weeks. Lives alone and unable to cook for self as becomes fatigued and has unstable gait.   Plan:  - regular diet  - nutrition consulted  - PT evaluated and recommended [facility]   - palliative on board, f/u recs I/s/o multiple medical problems including known malignancy, chronic gait instability. Progressive weight loss since lung cancer diagnosis, however reports >20 pound weight loss over past 2 weeks. Lives alone and unable to cook for self as becomes fatigued and has unstable gait.   Plan:  - regular diet  - nutrition consulted  - PT evaluated and recommended [facility], has placement   - palliative on board, f/u recs

## 2024-02-27 NOTE — PROGRESS NOTE ADULT - PROBLEM SELECTOR PLAN 10
Last seen urologist prior to COVID, per [facility] was seen in 2017. Takes terazosin 5mg and finasteride 5mg at home. Reports chronic nocturia and polyuria that has not particularly worsened.  - c/w home meds  - TOV tomorrow F: Encourage PO  E: K>4 Mg>2  N: Regular  GI: None  DVT: Lovenox 30 subq  Dispo: [facility] per PT

## 2024-02-28 ENCOUNTER — RESULT REVIEW (OUTPATIENT)
Age: 89
End: 2024-02-28

## 2024-02-28 LAB
ALBUMIN SERPL ELPH-MCNC: 1.9 G/DL — LOW (ref 3.3–5)
ALP SERPL-CCNC: 139 U/L — HIGH (ref 40–120)
ALT FLD-CCNC: 48 U/L — HIGH (ref 10–45)
ANION GAP SERPL CALC-SCNC: 8 MMOL/L — SIGNIFICANT CHANGE UP (ref 5–17)
AST SERPL-CCNC: 28 U/L — SIGNIFICANT CHANGE UP (ref 10–40)
BASOPHILS # BLD AUTO: 0.11 K/UL — SIGNIFICANT CHANGE UP (ref 0–0.2)
BASOPHILS NFR BLD AUTO: 0.4 % — SIGNIFICANT CHANGE UP (ref 0–2)
BILIRUB SERPL-MCNC: 0.2 MG/DL — SIGNIFICANT CHANGE UP (ref 0.2–1.2)
BUN SERPL-MCNC: 19 MG/DL — SIGNIFICANT CHANGE UP (ref 7–23)
CALCIUM SERPL-MCNC: 8 MG/DL — LOW (ref 8.4–10.5)
CHLORIDE SERPL-SCNC: 98 MMOL/L — SIGNIFICANT CHANGE UP (ref 96–108)
CO2 SERPL-SCNC: 24 MMOL/L — SIGNIFICANT CHANGE UP (ref 22–31)
CREAT SERPL-MCNC: 0.86 MG/DL — SIGNIFICANT CHANGE UP (ref 0.5–1.3)
EGFR: 83 ML/MIN/1.73M2 — SIGNIFICANT CHANGE UP
EOSINOPHIL # BLD AUTO: 0.36 K/UL — SIGNIFICANT CHANGE UP (ref 0–0.5)
EOSINOPHIL NFR BLD AUTO: 1.2 % — SIGNIFICANT CHANGE UP (ref 0–6)
GLUCOSE SERPL-MCNC: 93 MG/DL — SIGNIFICANT CHANGE UP (ref 70–99)
HCT VFR BLD CALC: 27 % — LOW (ref 39–50)
HGB BLD-MCNC: 8.7 G/DL — LOW (ref 13–17)
IMM GRANULOCYTES NFR BLD AUTO: 1 % — HIGH (ref 0–0.9)
LYMPHOCYTES # BLD AUTO: 1.81 K/UL — SIGNIFICANT CHANGE UP (ref 1–3.3)
LYMPHOCYTES # BLD AUTO: 6 % — LOW (ref 13–44)
MAGNESIUM SERPL-MCNC: 1.9 MG/DL — SIGNIFICANT CHANGE UP (ref 1.6–2.6)
MCHC RBC-ENTMCNC: 25.2 PG — LOW (ref 27–34)
MCHC RBC-ENTMCNC: 32.2 GM/DL — SIGNIFICANT CHANGE UP (ref 32–36)
MCV RBC AUTO: 78.3 FL — LOW (ref 80–100)
MONOCYTES # BLD AUTO: 1.72 K/UL — HIGH (ref 0–0.9)
MONOCYTES NFR BLD AUTO: 5.7 % — SIGNIFICANT CHANGE UP (ref 2–14)
NEUTROPHILS # BLD AUTO: 25.64 K/UL — HIGH (ref 1.8–7.4)
NEUTROPHILS NFR BLD AUTO: 85.7 % — HIGH (ref 43–77)
NRBC # BLD: 0 /100 WBCS — SIGNIFICANT CHANGE UP (ref 0–0)
PHOSPHATE SERPL-MCNC: 2.4 MG/DL — LOW (ref 2.5–4.5)
PLATELET # BLD AUTO: 421 K/UL — HIGH (ref 150–400)
POTASSIUM SERPL-MCNC: 4 MMOL/L — SIGNIFICANT CHANGE UP (ref 3.5–5.3)
POTASSIUM SERPL-SCNC: 4 MMOL/L — SIGNIFICANT CHANGE UP (ref 3.5–5.3)
PROT SERPL-MCNC: 4.8 G/DL — LOW (ref 6–8.3)
RBC # BLD: 3.45 M/UL — LOW (ref 4.2–5.8)
RBC # FLD: 16.4 % — HIGH (ref 10.3–14.5)
SODIUM SERPL-SCNC: 130 MMOL/L — LOW (ref 135–145)
WBC # BLD: 29.94 K/UL — HIGH (ref 3.8–10.5)
WBC # FLD AUTO: 29.94 K/UL — HIGH (ref 3.8–10.5)

## 2024-02-28 PROCEDURE — 76604 US EXAM CHEST: CPT | Mod: 26

## 2024-02-28 PROCEDURE — 88305 TISSUE EXAM BY PATHOLOGIST: CPT | Mod: 26

## 2024-02-28 PROCEDURE — 99233 SBSQ HOSP IP/OBS HIGH 50: CPT | Mod: GC

## 2024-02-28 PROCEDURE — 99233 SBSQ HOSP IP/OBS HIGH 50: CPT

## 2024-02-28 PROCEDURE — 88112 CYTOPATH CELL ENHANCE TECH: CPT | Mod: 26

## 2024-02-28 RX ORDER — FUROSEMIDE 40 MG
20 TABLET ORAL ONCE
Refills: 0 | Status: COMPLETED | OUTPATIENT
Start: 2024-02-28 | End: 2024-02-28

## 2024-02-28 RX ADMIN — Medication 650 MILLIGRAM(S): at 12:11

## 2024-02-28 RX ADMIN — Medication 20 MILLIGRAM(S): at 16:03

## 2024-02-28 RX ADMIN — Medication 85 MILLIMOLE(S): at 11:11

## 2024-02-28 RX ADMIN — Medication 650 MILLIGRAM(S): at 17:40

## 2024-02-28 RX ADMIN — Medication 650 MILLIGRAM(S): at 18:40

## 2024-02-28 RX ADMIN — PANTOPRAZOLE SODIUM 40 MILLIGRAM(S): 20 TABLET, DELAYED RELEASE ORAL at 06:01

## 2024-02-28 RX ADMIN — Medication 650 MILLIGRAM(S): at 23:00

## 2024-02-28 RX ADMIN — ATORVASTATIN CALCIUM 10 MILLIGRAM(S): 80 TABLET, FILM COATED ORAL at 21:46

## 2024-02-28 RX ADMIN — PIPERACILLIN AND TAZOBACTAM 25 GRAM(S): 4; .5 INJECTION, POWDER, LYOPHILIZED, FOR SOLUTION INTRAVENOUS at 12:15

## 2024-02-28 RX ADMIN — Medication 650 MILLIGRAM(S): at 11:11

## 2024-02-28 RX ADMIN — FINASTERIDE 5 MILLIGRAM(S): 5 TABLET, FILM COATED ORAL at 11:11

## 2024-02-28 RX ADMIN — Medication 3 MILLIGRAM(S): at 21:52

## 2024-02-28 RX ADMIN — HEPARIN SODIUM 5000 UNIT(S): 5000 INJECTION INTRAVENOUS; SUBCUTANEOUS at 21:45

## 2024-02-28 RX ADMIN — Medication 4 MILLIGRAM(S): at 21:46

## 2024-02-28 RX ADMIN — PIPERACILLIN AND TAZOBACTAM 25 GRAM(S): 4; .5 INJECTION, POWDER, LYOPHILIZED, FOR SOLUTION INTRAVENOUS at 19:34

## 2024-02-28 RX ADMIN — PIPERACILLIN AND TAZOBACTAM 25 GRAM(S): 4; .5 INJECTION, POWDER, LYOPHILIZED, FOR SOLUTION INTRAVENOUS at 05:56

## 2024-02-28 RX ADMIN — HEPARIN SODIUM 5000 UNIT(S): 5000 INJECTION INTRAVENOUS; SUBCUTANEOUS at 12:15

## 2024-02-28 RX ADMIN — HEPARIN SODIUM 5000 UNIT(S): 5000 INJECTION INTRAVENOUS; SUBCUTANEOUS at 05:57

## 2024-02-28 NOTE — PROGRESS NOTE ADULT - SUBJECTIVE AND OBJECTIVE BOX
PULMONARY CONSULT SERVICE FOLLOW-UP NOTE    INTERVAL HPI:  Reviewed chart and overnight events; patient seen and examined at bedside. Pt feels well. Continues to saturate well on room air. Is afebrile, WBC continues to rise. CT C/A/P completed.    MEDICATIONS:  Pulmonary:    Antimicrobials:  piperacillin/tazobactam IVPB.. 4.5 Gram(s) IV Intermittent every 8 hours    Anticoagulants:  heparin   Injectable 5000 Unit(s) SubCutaneous every 8 hours    Cardiac:  doxazosin 4 milliGRAM(s) Oral at bedtime      Allergies    No Known Allergies    Intolerances        Vital Signs Last 24 Hrs  T(C): 36.4 (28 Feb 2024 06:02), Max: 37.2 (27 Feb 2024 20:10)  T(F): 97.6 (28 Feb 2024 06:02), Max: 99 (27 Feb 2024 20:10)  HR: 95 (28 Feb 2024 06:02) (89 - 95)  BP: 101/58 (28 Feb 2024 06:02) (101/58 - 113/55)  BP(mean): 73 (28 Feb 2024 06:02) (73 - 73)  RR: 17 (28 Feb 2024 06:02) (16 - 17)  SpO2: 95% (28 Feb 2024 06:02) (93% - 95%)    Parameters below as of 28 Feb 2024 06:02  Patient On (Oxygen Delivery Method): room air        02-27 @ 07:01  -  02-28 @ 07:00  --------------------------------------------------------  IN: 0 mL / OUT: 2050 mL / NET: -2050 mL          PHYSICAL EXAM:  Constitutional: well-appearing  HEENT: NC/AT; PERRL, anicteric sclera; MMM  Neck: supple  Cardiovascular: +S1/S2, RRR  Respiratory: CTA B/L; no W/R/R  Gastrointestinal: soft, NT/ND  Extremities: WWP; no edema, clubbing or cyanosis  Vascular: 2+ radial pulses B/L  Neurological: awake and alert; SCHMIDT    LABS:      CBC Full  -  ( 28 Feb 2024 06:42 )  WBC Count : 29.94 K/uL  RBC Count : 3.45 M/uL  Hemoglobin : 8.7 g/dL  Hematocrit : 27.0 %  Platelet Count - Automated : 421 K/uL  Mean Cell Volume : 78.3 fl  Mean Cell Hemoglobin : 25.2 pg  Mean Cell Hemoglobin Concentration : 32.2 gm/dL  Auto Neutrophil # : 25.64 K/uL  Auto Lymphocyte # : 1.81 K/uL  Auto Monocyte # : 1.72 K/uL  Auto Eosinophil # : 0.36 K/uL  Auto Basophil # : 0.11 K/uL  Auto Neutrophil % : 85.7 %  Auto Lymphocyte % : 6.0 %  Auto Monocyte % : 5.7 %  Auto Eosinophil % : 1.2 %  Auto Basophil % : 0.4 %    02-28    130<L>  |  98  |  19  ----------------------------<  93  4.0   |  24  |  0.86    Ca    8.0<L>      28 Feb 2024 06:42  Phos  2.4     02-28  Mg     1.9     02-28    TPro  4.8<L>  /  Alb  1.9<L>  /  TBili  0.2  /  DBili  x   /  AST  28  /  ALT  48<H>  /  AlkPhos  139<H>  02-28          Urinalysis Basic - ( 28 Feb 2024 06:42 )    Color: x / Appearance: x / SG: x / pH: x  Gluc: 93 mg/dL / Ketone: x  / Bili: x / Urobili: x   Blood: x / Protein: x / Nitrite: x   Leuk Esterase: x / RBC: x / WBC x   Sq Epi: x / Non Sq Epi: x / Bacteria: x                RADIOLOGY & ADDITIONAL STUDIES:

## 2024-02-28 NOTE — PROGRESS NOTE ADULT - PROBLEM SELECTOR PLAN 3
Patient with Hx of BPH on medications outpatient. BS today showed 1.7L of urine in bladder. Trial of straight cath was unsuccessful therefore urology was called for Ruth placement. 1.5L was removed from bladder.   Urine output appropriate after the large amount of urine extracted during ruth placement    Plan:  - Continue Ruth for at least 3 days per urology recs  - TOV at [facility]

## 2024-02-28 NOTE — PROGRESS NOTE ADULT - PROBLEM SELECTOR PLAN 2
Patient meeting SIRS criteria (HR >90 and Leukocytosis. His leukocytosis is worsening from admission. Likely iso parapneumonic effusion.  s/p MIST II protocol and Vancomycin (d/c'd 2/25)   Plan:  - Trend WBC  - Continue with IV Zosyn while inpatient,  - Plan for Augmentin on discharge as there is low likelihood of pseudomonas infection at this time.

## 2024-02-28 NOTE — PROGRESS NOTE ADULT - PROBLEM SELECTOR PLAN 4
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 Follows with Dr Lawrence and Dr Fletcher (oncologist). s/p bronchoscopy, pleurx placement, and thoracentesis outpatient. EBUS FNA bx and BAL 11/2023 +malignant cells. RLL bx +SCC. Pending radiation treatment, last seen 12/2023 and was determined to be a good candidate for chemoradiation at that time however did not receive tx due to large pleural effusion. Now p/w FTT, progressive dyspnea, fatigue, weight loss, night sweats.    Plan:  - palliative consulted to aid in decision about possible chemo/radiation therapy  - Pulm following, pending final recs  - f/u outpaitent with pulm, Palliative, PCP and Heme/onc  - encourage PO intake. 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 Follows with Dr Lawrence and Dr Fletcher (oncologist). s/p bronchoscopy, pleurx placement, and thoracentesis outpatient. EBUS FNA bx and BAL 11/2023 +malignant cells. RLL bx +SCC. Pending radiation treatment, last seen 12/2023 and was determined to be a good candidate for chemoradiation at that time however did not receive tx due to large pleural effusion. Now p/w FTT, progressive dyspnea, fatigue, weight loss, night sweats.  CT on 2/27: The previously noted right lower lobe mass now appears as a nonenhancing geographic area of low-attenuation, compatible with post treatment change. Extensive adjacent focal atelectasis noted surrounding   the lesion, likely posttreatment change.   New moderate bilateral pleural effusions with right hydropneumothorax. Chest tube tip not positioned within the pneumothorax itself.    Plan:  - palliative consulted to aid in decision about possible chemo/radiation therapy  - f/u pulm recs  - f/u outpatient with pulm, Palliative, PCP and Heme/onc  - encourage PO intake.

## 2024-02-28 NOTE — PROGRESS NOTE ADULT - ASSESSMENT
88M PMH lung cancer with pleural effusions s/p Pleurx placement prior to this hospitalization, BPH, neuropathy, and anxiety presenting with progressive weakness, dyspnea on exertion, and inability to care for self at home. Admitted to [facility] for further evaluation and management of Failure to Thrive. Course c/b urinary retention needed ruth placement on 2/22 and parapneumonic effusion s/p MIST II protocol. Now recommended to [facility] by PT.

## 2024-02-28 NOTE — PROGRESS NOTE ADULT - ASSESSMENT
88M PMH squamous cell lung cancer, BPH, peripheral neuropathy, and anxiety presenting with progressive weakness, dyspnea on exertion, fatigue, and weight loss x2 weeks. Pulmonary consulted for known MPE and IPC.     #Malignant Pleural Effusion s/p IPC placement   #SCC of the Lung, Stage IIIA     Presenting with failure to thrive in the setting of known malignancy which has yet to be treated. Pulmonary consulted for known MPE s/p IPC placement by Dr. Lawrence. Currently on room air saturating well. Has leukocytosis, but otherwise no focal infectious symptoms. On bedside US, right sided small pleural effusion with visible septations. Per Dr. Lawrence who placed pleurx, effusion was septated when pleurx was initially placed one month ago. Initially unable to drain pleurx, instilled 5 mg tPA and now draining freely. Minimal output and cell count shows 95% neutrophils. Glucose 14 and LDH >2500, concerning for complicated parapneumonic effusion. Pt now s/p MIST2 protocol, effusion drained and pleurx disconnected from chest tube. Will resume regular drainage schedule based off US assessment of pleural space. CT chest/abd/pelvis done and read as showing post-treatment changes, but pt has not yet received treatment. It also shows subcarinal and right hilar LAD and b/l pleural effusion with right sided hydropneumothorax. Bedside US 2/28 shows bilateral B lines and bilateral simple appearing, small to moderate pleural effusions. WBC continue to rise but pt afebrile and saturating well on room air. Does note some right sided chest pain.    Recommendations:  NOTE INCOMPLETE UNTIL DISCUSSED WITH ATTENDING 88M PMH squamous cell lung cancer, BPH, peripheral neuropathy, and anxiety presenting with progressive weakness, dyspnea on exertion, fatigue, and weight loss x2 weeks. Pulmonary consulted for known MPE and IPC.     #Malignant Pleural Effusion s/p IPC placement   #SCC of the Lung, Stage IIIA     Presenting with failure to thrive in the setting of known malignancy which has yet to be treated. Pulmonary consulted for known MPE s/p IPC placement by Dr. Lawrence. Currently on room air saturating well. Has leukocytosis, but otherwise no focal infectious symptoms. On bedside US, right sided small pleural effusion with visible septations. Per Dr. Lawrence who placed pleurx, effusion was septated when pleurx was initially placed one month ago. Initially unable to drain pleurx, instilled 5 mg tPA and now draining freely. Minimal output and cell count shows 95% neutrophils. Glucose 14 and LDH >2500, concerning for complicated parapneumonic effusion. Pt now s/p MIST2 protocol, effusion drained and pleurx disconnected from chest tube. Will resume regular drainage schedule based off US assessment of pleural space. CT chest/abd/pelvis done and read as showing post-treatment changes, but pt has not yet received treatment. It also shows subcarinal and right hilar LAD and b/l pleural effusion with right sided hydropneumothorax. Bedside US 2/28 shows bilateral B lines and bilateral simple appearing, small to moderate pleural effusions. WBC continue to rise but pt afebrile and saturating well on room air. Does note some right sided chest pain. PleurX drained 2/28, 300 cc drained before pt experienced worsening pain. Given B lines and new b/l effusions, would check cardiac function with echo and trial diuresis.    Recommendations:  -would trial diures  -f/u TTE  -f/u pleural fluid cyto  -continue with normal pleurx drainage cycle, will discuss removing pleurx   -will discuss potential biopsy with onc team    Case s/e/d with Dr. Stewart

## 2024-02-28 NOTE — PROGRESS NOTE ADULT - PROBLEM SELECTOR PLAN 9
Last seen urologist prior to COVID, per [facility]IE was seen in 2017. Takes terazosin 5mg and finasteride 5mg at home. Reports chronic nocturia and polyuria that has not particularly worsened.  - c/w home meds  - TOV tomorrow

## 2024-02-28 NOTE — PROGRESS NOTE ADULT - SUBJECTIVE AND OBJECTIVE BOX
OVERNIGHT EVENTS: KAMINI    SUBJECTIVE:  The patient was seen and examined at the bedside this AM. States that     VITAL SIGNS:  Vital Signs Last 24 Hrs  T(C): 37.4 (28 Feb 2024 12:21), Max: 37.4 (28 Feb 2024 12:21)  T(F): 99.3 (28 Feb 2024 12:21), Max: 99.3 (28 Feb 2024 12:21)  HR: 95 (28 Feb 2024 12:21) (89 - 95)  BP: 95/53 (28 Feb 2024 12:21) (95/53 - 113/55)  BP(mean): 73 (28 Feb 2024 06:02) (73 - 73)  RR: 18 (28 Feb 2024 12:21) (17 - 18)  SpO2: 96% (28 Feb 2024 12:21) (93% - 96%)    Parameters below as of 28 Feb 2024 12:21  Patient On (Oxygen Delivery Method): room air        PHYSICAL EXAM:  General: NAD; speaking in full sentences  HEENT: NC/AT; PERRL; EOMI; MMM  Neck: supple; no JVD  Cardiac: RRR; +S1/S2  Pulm: CTA B/L; no W/R/R  GI: soft, NT/ND, +BS  Extremities: WWP; no edema, clubbing or cyanosis  Vasc: 2+ radial, DP pulses B/L  Neuro: AAOx3; no focal deficits    MEDICATIONS:  MEDICATIONS  (STANDING):  atorvastatin 10 milliGRAM(s) Oral at bedtime  doxazosin 4 milliGRAM(s) Oral at bedtime  finasteride 5 milliGRAM(s) Oral daily  heparin   Injectable 5000 Unit(s) SubCutaneous every 8 hours  pantoprazole    Tablet 40 milliGRAM(s) Oral before breakfast  piperacillin/tazobactam IVPB.. 4.5 Gram(s) IV Intermittent every 8 hours  polyethylene glycol 3350 17 Gram(s) Oral every 12 hours  senna 2 Tablet(s) Oral at bedtime    MEDICATIONS  (PRN):  acetaminophen     Tablet .. 650 milliGRAM(s) Oral every 6 hours PRN Temp greater or equal to 38C (100.4F), Mild Pain (1 - 3)  melatonin 3 milliGRAM(s) Oral at bedtime PRN Sleep      ALLERGIES:  Allergies    No Known Allergies    Intolerances        LABS:                        8.7    29.94 )-----------( 421      ( 28 Feb 2024 06:42 )             27.0     02-28    130<L>  |  98  |  19  ----------------------------<  93  4.0   |  24  |  0.86    Ca    8.0<L>      28 Feb 2024 06:42  Phos  2.4     02-28  Mg     1.9     02-28    TPro  4.8<L>  /  Alb  1.9<L>  /  TBili  0.2  /  DBili  x   /  AST  28  /  ALT  48<H>  /  AlkPhos  139<H>  02-28        RADIOLOGY & ADDITIONAL TESTS: Reviewed. OVERNIGHT EVENTS: KAMINI    SUBJECTIVE:  The patient was seen and examined at the bedside this AM. States that he was feeling some discomfort overnight in the right side of the chest that is improved now. No other complaints.     VITAL SIGNS:  Vital Signs Last 24 Hrs  T(C): 37.4 (28 Feb 2024 12:21), Max: 37.4 (28 Feb 2024 12:21)  T(F): 99.3 (28 Feb 2024 12:21), Max: 99.3 (28 Feb 2024 12:21)  HR: 95 (28 Feb 2024 12:21) (89 - 95)  BP: 95/53 (28 Feb 2024 12:21) (95/53 - 113/55)  BP(mean): 73 (28 Feb 2024 06:02) (73 - 73)  RR: 18 (28 Feb 2024 12:21) (17 - 18)  SpO2: 96% (28 Feb 2024 12:21) (93% - 96%)    Parameters below as of 28 Feb 2024 12:21  Patient On (Oxygen Delivery Method): room air        PHYSICAL EXAM:  General: NAD; speaking in full sentences  HEENT: NC/AT; PERRL; EOMI; MMM  Neck: supple; no JVD  Cardiac: RRR; +S1/S2  Pulm: CTA B/L; no W/R/R  GI: soft, NT/ND, +BS  Extremities: WWP; no edema, clubbing or cyanosis  MSK: Tenderness to palpation of the right chest.   Vasc: 2+ radial, DP pulses B/L  Neuro: AAOx3; no focal deficits    MEDICATIONS:  MEDICATIONS  (STANDING):  atorvastatin 10 milliGRAM(s) Oral at bedtime  doxazosin 4 milliGRAM(s) Oral at bedtime  finasteride 5 milliGRAM(s) Oral daily  heparin   Injectable 5000 Unit(s) SubCutaneous every 8 hours  pantoprazole    Tablet 40 milliGRAM(s) Oral before breakfast  piperacillin/tazobactam IVPB.. 4.5 Gram(s) IV Intermittent every 8 hours  polyethylene glycol 3350 17 Gram(s) Oral every 12 hours  senna 2 Tablet(s) Oral at bedtime    MEDICATIONS  (PRN):  acetaminophen     Tablet .. 650 milliGRAM(s) Oral every 6 hours PRN Temp greater or equal to 38C (100.4F), Mild Pain (1 - 3)  melatonin 3 milliGRAM(s) Oral at bedtime PRN Sleep      ALLERGIES:  Allergies    No Known Allergies    Intolerances        LABS:                        8.7    29.94 )-----------( 421      ( 28 Feb 2024 06:42 )             27.0     02-28    130<L>  |  98  |  19  ----------------------------<  93  4.0   |  24  |  0.86    Ca    8.0<L>      28 Feb 2024 06:42  Phos  2.4     02-28  Mg     1.9     02-28    TPro  4.8<L>  /  Alb  1.9<L>  /  TBili  0.2  /  DBili  x   /  AST  28  /  ALT  48<H>  /  AlkPhos  139<H>  02-28        RADIOLOGY & ADDITIONAL TESTS: Reviewed.

## 2024-02-28 NOTE — PROGRESS NOTE ADULT - PROBLEM SELECTOR PLAN 1
I/s/o multiple medical problems including known malignancy, chronic gait instability. Progressive weight loss since lung cancer diagnosis, however reports >20 pound weight loss over past 2 weeks. Lives alone and unable to cook for self as becomes fatigued and has unstable gait.   Plan:  - regular diet  - nutrition consulted  - PT evaluated and recommended [facility], has placement   - palliative on board, f/u recs

## 2024-02-29 VITALS
RESPIRATION RATE: 17 BRPM | TEMPERATURE: 99 F | SYSTOLIC BLOOD PRESSURE: 110 MMHG | HEART RATE: 99 BPM | OXYGEN SATURATION: 94 % | DIASTOLIC BLOOD PRESSURE: 60 MMHG

## 2024-02-29 DIAGNOSIS — R19.7 DIARRHEA, UNSPECIFIED: ICD-10-CM

## 2024-02-29 LAB
ALBUMIN SERPL ELPH-MCNC: 1.9 G/DL — LOW (ref 3.3–5)
ALP SERPL-CCNC: 150 U/L — HIGH (ref 40–120)
ALT FLD-CCNC: 40 U/L — SIGNIFICANT CHANGE UP (ref 10–45)
ANION GAP SERPL CALC-SCNC: 10 MMOL/L — SIGNIFICANT CHANGE UP (ref 5–17)
AST SERPL-CCNC: 26 U/L — SIGNIFICANT CHANGE UP (ref 10–40)
BILIRUB SERPL-MCNC: 0.2 MG/DL — SIGNIFICANT CHANGE UP (ref 0.2–1.2)
BUN SERPL-MCNC: 18 MG/DL — SIGNIFICANT CHANGE UP (ref 7–23)
CALCIUM SERPL-MCNC: 7.7 MG/DL — LOW (ref 8.4–10.5)
CHLORIDE SERPL-SCNC: 97 MMOL/L — SIGNIFICANT CHANGE UP (ref 96–108)
CO2 SERPL-SCNC: 25 MMOL/L — SIGNIFICANT CHANGE UP (ref 22–31)
CREAT SERPL-MCNC: 0.84 MG/DL — SIGNIFICANT CHANGE UP (ref 0.5–1.3)
EGFR: 84 ML/MIN/1.73M2 — SIGNIFICANT CHANGE UP
GLUCOSE SERPL-MCNC: 117 MG/DL — HIGH (ref 70–99)
HCT VFR BLD CALC: 27 % — LOW (ref 39–50)
HGB BLD-MCNC: 8.4 G/DL — LOW (ref 13–17)
MAGNESIUM SERPL-MCNC: 1.9 MG/DL — SIGNIFICANT CHANGE UP (ref 1.6–2.6)
MCHC RBC-ENTMCNC: 24.9 PG — LOW (ref 27–34)
MCHC RBC-ENTMCNC: 31.1 GM/DL — LOW (ref 32–36)
MCV RBC AUTO: 79.9 FL — LOW (ref 80–100)
NRBC # BLD: 0 /100 WBCS — SIGNIFICANT CHANGE UP (ref 0–0)
PHOSPHATE SERPL-MCNC: 2.6 MG/DL — SIGNIFICANT CHANGE UP (ref 2.5–4.5)
PLATELET # BLD AUTO: 367 K/UL — SIGNIFICANT CHANGE UP (ref 150–400)
POTASSIUM SERPL-MCNC: 3.9 MMOL/L — SIGNIFICANT CHANGE UP (ref 3.5–5.3)
POTASSIUM SERPL-SCNC: 3.9 MMOL/L — SIGNIFICANT CHANGE UP (ref 3.5–5.3)
PROT SERPL-MCNC: 4.8 G/DL — LOW (ref 6–8.3)
RBC # BLD: 3.38 M/UL — LOW (ref 4.2–5.8)
RBC # FLD: 17 % — HIGH (ref 10.3–14.5)
SODIUM SERPL-SCNC: 132 MMOL/L — LOW (ref 135–145)
WBC # BLD: 29.15 K/UL — HIGH (ref 3.8–10.5)
WBC # FLD AUTO: 29.15 K/UL — HIGH (ref 3.8–10.5)

## 2024-02-29 PROCEDURE — 93005 ELECTROCARDIOGRAM TRACING: CPT

## 2024-02-29 PROCEDURE — 86850 RBC ANTIBODY SCREEN: CPT

## 2024-02-29 PROCEDURE — 80048 BASIC METABOLIC PNL TOTAL CA: CPT

## 2024-02-29 PROCEDURE — 97530 THERAPEUTIC ACTIVITIES: CPT

## 2024-02-29 PROCEDURE — 84311 SPECTROPHOTOMETRY: CPT

## 2024-02-29 PROCEDURE — 82570 ASSAY OF URINE CREATININE: CPT

## 2024-02-29 PROCEDURE — 97161 PT EVAL LOW COMPLEX 20 MIN: CPT

## 2024-02-29 PROCEDURE — 85025 COMPLETE CBC W/AUTO DIFF WBC: CPT

## 2024-02-29 PROCEDURE — 83540 ASSAY OF IRON: CPT

## 2024-02-29 PROCEDURE — 81001 URINALYSIS AUTO W/SCOPE: CPT

## 2024-02-29 PROCEDURE — 80076 HEPATIC FUNCTION PANEL: CPT

## 2024-02-29 PROCEDURE — 83010 ASSAY OF HAPTOGLOBIN QUANT: CPT

## 2024-02-29 PROCEDURE — 80202 ASSAY OF VANCOMYCIN: CPT

## 2024-02-29 PROCEDURE — 88112 CYTOPATH CELL ENHANCE TECH: CPT

## 2024-02-29 PROCEDURE — 99233 SBSQ HOSP IP/OBS HIGH 50: CPT | Mod: GC

## 2024-02-29 PROCEDURE — 84100 ASSAY OF PHOSPHORUS: CPT

## 2024-02-29 PROCEDURE — 74019 RADEX ABDOMEN 2 VIEWS: CPT

## 2024-02-29 PROCEDURE — 82607 VITAMIN B-12: CPT

## 2024-02-29 PROCEDURE — 88305 TISSUE EXAM BY PATHOLOGIST: CPT

## 2024-02-29 PROCEDURE — 82042 OTHER SOURCE ALBUMIN QUAN EA: CPT

## 2024-02-29 PROCEDURE — 84300 ASSAY OF URINE SODIUM: CPT

## 2024-02-29 PROCEDURE — 80053 COMPREHEN METABOLIC PANEL: CPT

## 2024-02-29 PROCEDURE — 97110 THERAPEUTIC EXERCISES: CPT

## 2024-02-29 PROCEDURE — 84466 ASSAY OF TRANSFERRIN: CPT

## 2024-02-29 PROCEDURE — 87641 MR-STAPH DNA AMP PROBE: CPT

## 2024-02-29 PROCEDURE — 89051 BODY FLUID CELL COUNT: CPT

## 2024-02-29 PROCEDURE — 86901 BLOOD TYPING SEROLOGIC RH(D): CPT

## 2024-02-29 PROCEDURE — 83615 LACTATE (LD) (LDH) ENZYME: CPT

## 2024-02-29 PROCEDURE — 82306 VITAMIN D 25 HYDROXY: CPT

## 2024-02-29 PROCEDURE — 82652 VIT D 1 25-DIHYDROXY: CPT

## 2024-02-29 PROCEDURE — 86140 C-REACTIVE PROTEIN: CPT

## 2024-02-29 PROCEDURE — 71260 CT THORAX DX C+: CPT | Mod: MC

## 2024-02-29 PROCEDURE — 82746 ASSAY OF FOLIC ACID SERUM: CPT

## 2024-02-29 PROCEDURE — 83935 ASSAY OF URINE OSMOLALITY: CPT

## 2024-02-29 PROCEDURE — 86900 BLOOD TYPING SEROLOGIC ABO: CPT

## 2024-02-29 PROCEDURE — 87040 BLOOD CULTURE FOR BACTERIA: CPT

## 2024-02-29 PROCEDURE — 99285 EMERGENCY DEPT VISIT HI MDM: CPT

## 2024-02-29 PROCEDURE — 74177 CT ABD & PELVIS W/CONTRAST: CPT | Mod: MC

## 2024-02-29 PROCEDURE — 85045 AUTOMATED RETICULOCYTE COUNT: CPT

## 2024-02-29 PROCEDURE — 87640 STAPH A DNA AMP PROBE: CPT

## 2024-02-29 PROCEDURE — 85027 COMPLETE CBC AUTOMATED: CPT

## 2024-02-29 PROCEDURE — 82945 GLUCOSE OTHER FLUID: CPT

## 2024-02-29 PROCEDURE — 82728 ASSAY OF FERRITIN: CPT

## 2024-02-29 PROCEDURE — 85652 RBC SED RATE AUTOMATED: CPT

## 2024-02-29 PROCEDURE — 76604 US EXAM CHEST: CPT | Mod: 26

## 2024-02-29 PROCEDURE — 83605 ASSAY OF LACTIC ACID: CPT

## 2024-02-29 PROCEDURE — 84157 ASSAY OF PROTEIN OTHER: CPT

## 2024-02-29 PROCEDURE — 99233 SBSQ HOSP IP/OBS HIGH 50: CPT

## 2024-02-29 PROCEDURE — 71045 X-RAY EXAM CHEST 1 VIEW: CPT

## 2024-02-29 PROCEDURE — 84145 PROCALCITONIN (PCT): CPT

## 2024-02-29 PROCEDURE — 83735 ASSAY OF MAGNESIUM: CPT

## 2024-02-29 PROCEDURE — 84156 ASSAY OF PROTEIN URINE: CPT

## 2024-02-29 PROCEDURE — 36415 COLL VENOUS BLD VENIPUNCTURE: CPT

## 2024-02-29 PROCEDURE — 97165 OT EVAL LOW COMPLEX 30 MIN: CPT

## 2024-02-29 PROCEDURE — 83986 ASSAY PH BODY FLUID NOS: CPT

## 2024-02-29 RX ORDER — CEPHALEXIN 500 MG
500 CAPSULE ORAL EVERY 12 HOURS
Refills: 0 | Status: DISCONTINUED | OUTPATIENT
Start: 2024-02-29 | End: 2024-02-29

## 2024-02-29 RX ADMIN — Medication 650 MILLIGRAM(S): at 17:23

## 2024-02-29 RX ADMIN — PIPERACILLIN AND TAZOBACTAM 25 GRAM(S): 4; .5 INJECTION, POWDER, LYOPHILIZED, FOR SOLUTION INTRAVENOUS at 12:26

## 2024-02-29 RX ADMIN — HEPARIN SODIUM 5000 UNIT(S): 5000 INJECTION INTRAVENOUS; SUBCUTANEOUS at 14:13

## 2024-02-29 RX ADMIN — Medication 650 MILLIGRAM(S): at 16:23

## 2024-02-29 RX ADMIN — PIPERACILLIN AND TAZOBACTAM 25 GRAM(S): 4; .5 INJECTION, POWDER, LYOPHILIZED, FOR SOLUTION INTRAVENOUS at 04:25

## 2024-02-29 RX ADMIN — PANTOPRAZOLE SODIUM 40 MILLIGRAM(S): 20 TABLET, DELAYED RELEASE ORAL at 06:36

## 2024-02-29 RX ADMIN — HEPARIN SODIUM 5000 UNIT(S): 5000 INJECTION INTRAVENOUS; SUBCUTANEOUS at 06:36

## 2024-02-29 RX ADMIN — FINASTERIDE 5 MILLIGRAM(S): 5 TABLET, FILM COATED ORAL at 12:26

## 2024-02-29 NOTE — PROGRESS NOTE ADULT - PROBLEM SELECTOR PLAN 7
BMI 18, >20 pound weight loss over past 2 weeks.  - nutrition consulted and recommended regular diet with Ensure Enlive twice per day RESOLVED  The patient was complaining of constipation overnight. Started on Miralax, however, per nursing, the patient was given senna and had a large bowel movement, therefore, Miralax was held.   He is complaining of abdominal discomfort at this time, improved from admission  Abdominal xray with stool burden.  Plan:  - As above

## 2024-02-29 NOTE — PROCEDURE NOTE - NSUS ED PROC ASSISTED BY1 FT
[facility]

## 2024-02-29 NOTE — PROGRESS NOTE ADULT - PROBLEM SELECTOR PLAN 6
IMPROVING  The patient was complaining of constipation overnight. Started on Miralax, however, per nursing, the patient was given senna and had a large bowel movement, therefore, Miralax was held.   He is complaining of abdominal discomfort at this time, improved from admission  Abdominal xray with stool burden.  Plan:  - c/w senna at night, patient refused overnight and simethicone was given  - Miralax PRN patient with diarrhea episodes, reoported about 6 in the last 24 hours. No pain or blood  Plan:  - D/c Laxatives at this time   - Will send c. diff if it does not improve

## 2024-02-29 NOTE — PROGRESS NOTE ADULT - PROBLEM SELECTOR PLAN 10
F: Encourage PO  E: K>4 Mg>2  N: Regular  GI: None  DVT: Lovenox 30 subq  Dispo: [facility] per PT Last seen urologist prior to COVID, per [facility]IE was seen in 2017. Takes terazosin 5mg and finasteride 5mg at home. Reports chronic nocturia and polyuria that has not particularly worsened.  - c/w home meds  - TOV tomorrow

## 2024-02-29 NOTE — PROGRESS NOTE ADULT - SUBJECTIVE AND OBJECTIVE BOX
PULMONARY CONSULT SERVICE FOLLOW-UP NOTE    INTERVAL HPI:  Reviewed chart and overnight events; patient seen and examined at bedside. Pt still feels well, saturating well on room air, afebrile, with WBC stable at 29.    MEDICATIONS:  Pulmonary:    Antimicrobials:  piperacillin/tazobactam IVPB.. 4.5 Gram(s) IV Intermittent every 8 hours    Anticoagulants:  heparin   Injectable 5000 Unit(s) SubCutaneous every 8 hours    Cardiac:  doxazosin 4 milliGRAM(s) Oral at bedtime      Allergies    No Known Allergies    Intolerances        Vital Signs Last 24 Hrs  T(C): 37 (29 Feb 2024 12:39), Max: 37.4 (28 Feb 2024 21:00)  T(F): 98.6 (29 Feb 2024 12:39), Max: 99.4 (28 Feb 2024 21:00)  HR: 99 (29 Feb 2024 12:39) (84 - 99)  BP: 110/60 (29 Feb 2024 12:39) (95/56 - 110/60)  BP(mean): --  RR: 17 (29 Feb 2024 12:39) (16 - 18)  SpO2: 94% (29 Feb 2024 12:39) (94% - 97%)    Parameters below as of 29 Feb 2024 12:39  Patient On (Oxygen Delivery Method): room air        02-28 @ 07:01 - 02-29 @ 07:00  --------------------------------------------------------  IN: 0 mL / OUT: 2600 mL / NET: -2600 mL    02-29 @ 07:01  -  02-29 @ 15:58  --------------------------------------------------------  IN: 0 mL / OUT: 1200 mL / NET: -1200 mL          PHYSICAL EXAM:  Constitutional: well-appearing  HEENT: NC/AT; PERRL, anicteric sclera; MMM  Neck: supple  Cardiovascular: +S1/S2, RRR  Respiratory: diminished breath sounds at bases  Gastrointestinal: soft, NT/ND  Extremities: WWP; no edema, clubbing or cyanosis  Vascular: 2+ radial pulses B/L  Neurological: awake and alert; SCHMIDT    LABS:      CBC Full  -  ( 29 Feb 2024 05:30 )  WBC Count : 29.15 K/uL  RBC Count : 3.38 M/uL  Hemoglobin : 8.4 g/dL  Hematocrit : 27.0 %  Platelet Count - Automated : 367 K/uL  Mean Cell Volume : 79.9 fl  Mean Cell Hemoglobin : 24.9 pg  Mean Cell Hemoglobin Concentration : 31.1 gm/dL  Auto Neutrophil # : x  Auto Lymphocyte # : x  Auto Monocyte # : x  Auto Eosinophil # : x  Auto Basophil # : x  Auto Neutrophil % : x  Auto Lymphocyte % : x  Auto Monocyte % : x  Auto Eosinophil % : x  Auto Basophil % : x    02-29    132<L>  |  97  |  18  ----------------------------<  117<H>  3.9   |  25  |  0.84    Ca    7.7<L>      29 Feb 2024 05:30  Phos  2.6     02-29  Mg     1.9     02-29    TPro  4.8<L>  /  Alb  1.9<L>  /  TBili  0.2  /  DBili  x   /  AST  26  /  ALT  40  /  AlkPhos  150<H>  02-29          Urinalysis Basic - ( 29 Feb 2024 05:30 )    Color: x / Appearance: x / SG: x / pH: x  Gluc: 117 mg/dL / Ketone: x  / Bili: x / Urobili: x   Blood: x / Protein: x / Nitrite: x   Leuk Esterase: x / RBC: x / WBC x   Sq Epi: x / Non Sq Epi: x / Bacteria: x                RADIOLOGY & ADDITIONAL STUDIES:

## 2024-02-29 NOTE — PROGRESS NOTE ADULT - PROVIDER SPECIALTY LIST ADULT
Pulmonology
Pulmonology
Rehab Medicine
Internal Medicine
Internal Medicine
Pulmonology
Rehab Medicine
Internal Medicine
Pulmonology
Pulmonology
Palliative Care
Internal Medicine
Palliative Care
Internal Medicine

## 2024-02-29 NOTE — PROGRESS NOTE ADULT - SUBJECTIVE AND OBJECTIVE BOX
** INCOMPLETE **    OVERNIGHT EVENTS:     SUBJECTIVE:    VITAL SIGNS:  Vital Signs Last 24 Hrs  T(C): 36.7 (29 Feb 2024 06:28), Max: 37.4 (28 Feb 2024 12:21)  T(F): 98.1 (29 Feb 2024 06:28), Max: 99.4 (28 Feb 2024 21:00)  HR: 89 (29 Feb 2024 06:28) (84 - 95)  BP: 106/64 (29 Feb 2024 06:28) (95/53 - 106/64)  BP(mean): --  RR: 16 (29 Feb 2024 06:28) (16 - 18)  SpO2: 97% (29 Feb 2024 06:28) (96% - 97%)    Parameters below as of 29 Feb 2024 06:28  Patient On (Oxygen Delivery Method): room air        PHYSICAL EXAM:  General: NAD; speaking in full sentences  HEENT: NC/AT; PERRL; EOMI; MMM  Neck: supple; no JVD  Cardiac: RRR; +S1/S2  Pulm: CTA B/L; no W/R/R  GI: soft, NT/ND, +BS  Extremities: WWP; no edema, clubbing or cyanosis  Vasc: 2+ radial, DP pulses B/L  Neuro: AAOx3; no focal deficits    MEDICATIONS:  MEDICATIONS  (STANDING):  atorvastatin 10 milliGRAM(s) Oral at bedtime  doxazosin 4 milliGRAM(s) Oral at bedtime  finasteride 5 milliGRAM(s) Oral daily  heparin   Injectable 5000 Unit(s) SubCutaneous every 8 hours  pantoprazole    Tablet 40 milliGRAM(s) Oral before breakfast  piperacillin/tazobactam IVPB.. 4.5 Gram(s) IV Intermittent every 8 hours    MEDICATIONS  (PRN):  acetaminophen     Tablet .. 650 milliGRAM(s) Oral every 6 hours PRN Temp greater or equal to 38C (100.4F), Mild Pain (1 - 3)  melatonin 3 milliGRAM(s) Oral at bedtime PRN Sleep      ALLERGIES:  Allergies    No Known Allergies    Intolerances        LABS:                        8.4    29.15 )-----------( 367      ( 29 Feb 2024 05:30 )             27.0     02-29    132<L>  |  97  |  18  ----------------------------<  117<H>  3.9   |  25  |  0.84    Ca    7.7<L>      29 Feb 2024 05:30  Phos  2.6     02-29  Mg     1.9     02-29    TPro  4.8<L>  /  Alb  1.9<L>  /  TBili  0.2  /  DBili  x   /  AST  26  /  ALT  40  /  AlkPhos  150<H>  02-29        RADIOLOGY & ADDITIONAL TESTS: Reviewed. OVERNIGHT EVENTS: KAMINI    SUBJECTIVE:  The patient was seen and examined at the bedside this AM. The patient is reporting diarrhea (about 6 episodes in last 24 hours)    VITAL SIGNS:  Vital Signs Last 24 Hrs  T(C): 36.7 (29 Feb 2024 06:28), Max: 37.4 (28 Feb 2024 12:21)  T(F): 98.1 (29 Feb 2024 06:28), Max: 99.4 (28 Feb 2024 21:00)  HR: 89 (29 Feb 2024 06:28) (84 - 95)  BP: 106/64 (29 Feb 2024 06:28) (95/53 - 106/64)  BP(mean): --  RR: 16 (29 Feb 2024 06:28) (16 - 18)  SpO2: 97% (29 Feb 2024 06:28) (96% - 97%)    Parameters below as of 29 Feb 2024 06:28  Patient On (Oxygen Delivery Method): room air        PHYSICAL EXAM:  General: Speaking in full sentences  HEENT: PERRL; EOMI; MMM  Neck: supple; no JVD  Cardiac: RRR; +S1/S2, no murmur   Pulm: CTA B/L; no W/R/R  GI: soft, NT/ND, +BS  Extremities: WWP; no edema, clubbing or cyanosis  Vasc: 2+ radial, DP pulses B/L  Neuro: AAOx3; no focal deficits    MEDICATIONS:  MEDICATIONS  (STANDING):  atorvastatin 10 milliGRAM(s) Oral at bedtime  doxazosin 4 milliGRAM(s) Oral at bedtime  finasteride 5 milliGRAM(s) Oral daily  heparin   Injectable 5000 Unit(s) SubCutaneous every 8 hours  pantoprazole    Tablet 40 milliGRAM(s) Oral before breakfast  piperacillin/tazobactam IVPB.. 4.5 Gram(s) IV Intermittent every 8 hours    MEDICATIONS  (PRN):  acetaminophen     Tablet .. 650 milliGRAM(s) Oral every 6 hours PRN Temp greater or equal to 38C (100.4F), Mild Pain (1 - 3)  melatonin 3 milliGRAM(s) Oral at bedtime PRN Sleep      ALLERGIES:  Allergies    No Known Allergies    Intolerances        LABS:                        8.4    29.15 )-----------( 367      ( 29 Feb 2024 05:30 )             27.0     02-29    132<L>  |  97  |  18  ----------------------------<  117<H>  3.9   |  25  |  0.84    Ca    7.7<L>      29 Feb 2024 05:30  Phos  2.6     02-29  Mg     1.9     02-29    TPro  4.8<L>  /  Alb  1.9<L>  /  TBili  0.2  /  DBili  x   /  AST  26  /  ALT  40  /  AlkPhos  150<H>  02-29        RADIOLOGY & ADDITIONAL TESTS: Reviewed.

## 2024-02-29 NOTE — PROGRESS NOTE ADULT - ATTENDING COMMENTS
MRSA nares negative. DC vanc. pulm following. Today feels well. No new complaints. continue course.
Patient seen and examined with fellow.  Agree with note as above.
Patient seen and examined.  Agree with fellow note.  Advanced Care Planning as above.
89 yo M with PMHx lung SCC, BPH, peripheral neuropathy, anxiety px from home with 2-week hx of progressive weakness and dyspnea on exertion admitted for failure to thrive in setting of known advanced malignancy.      #Squamous cell carcinoma of the lung –   Px with 2-week hx of progressively worsening weakness, dyspnea on exertion, fatigue, and decreased appetite. Currently, afebrile. HR . SBP 90-110s. RR 17-18. SpO2 97% on RA at rest.   WBC 24.51. Neutrophil predominant. No bandemia. .2. Lactate 1.2.   CXR with RLL consolidation/pleural effusion with PleurX in place, similar to previous CXR in 1/2024.   Current px consistent with likely failure to thrive in setting of known advanced malignancy.   WBC down to 22k - Low suspicion for active infection. Monitor off abx.   Pending chemotx/XRT outpatient.   Follows with Dr. Lawrence (Pulm) and Dr. Fletcher (Onc) outpatient.   Pulmonary consulted - pending POCUS        FTT  palliative consulted   pending PT and nutrition consult
87 yo M with PMHx lung SCC, BPH, peripheral neuropathy, anxiety px from home with 2-week hx of progressive weakness and dyspnea on exertion admitted for failure to thrive in setting of known advanced malignancy.      #Complicated effusion r/o empyema   #Squamous cell carcinoma of the lung –   Px with 2-week hx of progressively worsening weakness, dyspnea on exertion, fatigue, and decreased appetite. Currently, afebrile. HR . SBP 90-110s. RR 17-18. SpO2 97% on RA at rest.   on admission WBC 24.51. Neutrophil predominant. No bandemia. .2. Lactate 1.2.   CXR with RLL consolidation/pleural effusion with PleurX in place, similar to previous CXR in 1/2024.   Current px consistent with likely failure to thrive in setting of known advanced malignancy.   WBC trending in 20k's -- likely in the setting of malignancy   Pending chemotx/XRT outpatient.   s/p tPA/dornase instilled through pleurx  Follows with Dr. Lawrence (Pulm) and Dr. Fletcher (Onc) outpatient.   started on bs abx with vanc/zosyn - now continued on zosyn - per pulm likely 2 weeks of pO abx   fu final pulm recs - monitor for fluid re-accumulation       #acute urinary retention 2/2 constipation   #nupur   cr uptrended - 2/22  bladder scan reveals urinary retention 1.2 l pt with hx of BPH   continued on home meds   will be discharged with ruth and uro outpt   cr now back to bs   avoid nephro toxins         FTT  palliative consulted - remains full code   PT  recommended CHENCHO
89 yo M with PMHx lung SCC, BPH, peripheral neuropathy, anxiety px from home with 2-week hx of progressive weakness and dyspnea on exertion admitted for failure to thrive in setting of known advanced malignancy.      #Complicated effusion r/o empyema   #Squamous cell carcinoma of the lung –   Px with 2-week hx of progressively worsening weakness, dyspnea on exertion, fatigue, and decreased appetite. Currently, afebrile. HR . SBP 90-110s. RR 17-18. SpO2 97% on RA at rest.   on admission WBC 24.51. Neutrophil predominant. No bandemia. .2. Lactate 1.2.   CXR with RLL consolidation/pleural effusion with PleurX in place, similar to previous CXR in 1/2024.   Current px consistent with likely failure to thrive in setting of known advanced malignancy.   WBC trending in 20k's -- likely in the setting of malignancy   Pending chemotx/XRT outpatient.   s/p tPA/dornase instilled through pleurx  Follows with Dr. Lawrenec (Pulm) and Dr. Fletcher (Onc) outpatient.   started on bs abx with vanc/zosyn - now continued on zosyn - per pulm likely 2 weeks of pO abx   monitor for fluid re-accumulation - pending tte s/p trial of lasix 20 iv - with good urine outpt   pending final pulm recs       #acute urinary retention 2/2 constipation   #nupur   cr uptrended - 2/22  bladder scan reveals urinary retention 1.2 l pt with hx of BPH   continued on home meds   will be discharged with ruth and uro outpt   cr now back to bs   avoid nephro toxins         FTT  palliative consulted - remains full code   PT  recommended [facility]ENCHO
87 yo M with PMHx lung SCC, BPH, peripheral neuropathy, anxiety px from home with 2-week hx of progressive weakness and dyspnea on exertion admitted for failure to thrive in setting of known advanced malignancy.      #Complicated effusion r/o empyema   #Squamous cell carcinoma of the lung –   Px with 2-week hx of progressively worsening weakness, dyspnea on exertion, fatigue, and decreased appetite. Currently, afebrile. HR . SBP 90-110s. RR 17-18. SpO2 97% on RA at rest.   on admission WBC 24.51. Neutrophil predominant. No bandemia. .2. Lactate 1.2.   CXR with RLL consolidation/pleural effusion with PleurX in place, similar to previous CXR in 1/2024.   Current px consistent with likely failure to thrive in setting of known advanced malignancy.   WBC lateral    Pending chemotx/XRT outpatient.   s/p tPA/dornase instilled through pleurx  Follows with Dr. Lawrence (Pulm) and Dr. Fletcher (Onc) outpatient.   started on bs abx with vanc/zosyn - now co ntinued on zosyn - per pulm likely 2 weeks of pO abx   fu final pulm recs       #acute urinary retention 2/2 constipation   #nupur   cr uptrended - 2/22  bladder scan reveals urinary retention 1.2 l pt with hx of BPH   continued on home meds   will be discharged with ruth and uro outpt   cr now back to bs   avoid nephro toxins         FTT  palliative consulted - remains full code   PT  recommended [facility]ENCHO
89 yo M with PMHx lung SCC, BPH, peripheral neuropathy, anxiety px from home with 2-week hx of progressive weakness and dyspnea on exertion admitted for failure to thrive in setting of known advanced malignancy.      #Complicated effusion r/o empyema   #Squamous cell carcinoma of the lung –   Px with 2-week hx of progressively worsening weakness, dyspnea on exertion, fatigue, and decreased appetite. Currently, afebrile. HR . SBP 90-110s. RR 17-18. SpO2 97% on RA at rest.   on admission WBC 24.51. Neutrophil predominant. No bandemia. .2. Lactate 1.2.   CXR with RLL consolidation/pleural effusion with PleurX in place, similar to previous CXR in 1/2024.   Current px consistent with likely failure to thrive in setting of known advanced malignancy.   WBC trending in 20k's   Pending chemotx/XRT outpatient.   s/p tPA/dornase instilled through pleurx  Follows with Dr. Lawrence (Pulm) and Dr. Fletcher (Onc) outpatient.   started on bs abx with vanc/zosyn - now continued on zosyn - per pulm likely 2 weeks of pO abx   fu final pulm recs - monitor for fluid re-accumulation       #acute urinary retention 2/2 constipation   #nupur   cr uptrended - 2/22  bladder scan reveals urinary retention 1.2 l pt with hx of BPH   continued on home meds   will be discharged with ruth and uro outpt   cr now back to bs   avoid nephro toxins         FTT  palliative consulted - remains full code   PT  recommended [facility]ENCHO
89 yo M with PMHx lung SCC, BPH, peripheral neuropathy, anxiety px from home with 2-week hx of progressive weakness and dyspnea on exertion admitted for failure to thrive in setting of known advanced malignancy.      #Complicated effusion r/o empyema   #Squamous cell carcinoma of the lung –   Px with 2-week hx of progressively worsening weakness, dyspnea on exertion, fatigue, and decreased appetite. Currently, afebrile. HR . SBP 90-110s. RR 17-18. SpO2 97% on RA at rest.   on admission WBC 24.51. Neutrophil predominant. No bandemia. .2. Lactate 1.2.   CXR with RLL consolidation/pleural effusion with PleurX in place, similar to previous CXR in 1/2024.   Current px consistent with likely failure to thrive in setting of known advanced malignancy.   WBC uptrending  - pending fluid analysis. remains afebrile -    Pending chemotx/XRT outpatient.   Follows with Dr. Lawrence (Pulm) and Dr. Fletcher (Onc) outpatient.   started on bs abx with vanc/zosyn   pending fluid analysis   pulm following       #acute urinary retention 2/2 constipation   #nupur   cr uptrended - 2/22  bladder scan reveals urinary retention 1.2 l pt with hx of BPH   continued on home meds   will be discharged with ruth and uro outpt   cr now back to bs .89   avoid nephro toxins         FTT  palliative consulted - remains full code   PT  recommended [facility]ENCHO
89 yo M with PMHx lung SCC, BPH, peripheral neuropathy, anxiety px from home with 2-week hx of progressive weakness and dyspnea on exertion admitted for failure to thrive in setting of known advanced malignancy.        #acute urinary retention 2/2 constipation   #nupur   cr uptrended   bladder scan reveals urinary retention 1.2 l pt with hx of BPH   continued on home meds   will straight cath   avoid nephro toxins     #Squamous cell carcinoma of the lung –   Px with 2-week hx of progressively worsening weakness, dyspnea on exertion, fatigue, and decreased appetite. Currently, afebrile. HR . SBP 90-110s. RR 17-18. SpO2 97% on RA at rest.   on admission WBC 24.51. Neutrophil predominant. No bandemia. .2. Lactate 1.2.   CXR with RLL consolidation/pleural effusion with PleurX in place, similar to previous CXR in 1/2024.   Current px consistent with likely failure to thrive in setting of known advanced malignancy.   WBC uptrending  - pending fluid analysis. remains afebrile -    Pending chemotx/XRT outpatient.   Follows with Dr. Lawrenec (Pulm) and Dr. Fletcher (Onc) outpatient.   Pulmonary consulted - pending POCUS am       FTT  palliative consulted - remains full code   pending PT and nutrition consult

## 2024-02-29 NOTE — PROCEDURE NOTE - NSUSDIAGNOSIS_ED_ALL
Pleural Effusion Right
Pleural Effusion Right/Pleural Effusion Left
Pleural Effusion Right/Pleural Effusion Left

## 2024-02-29 NOTE — PROGRESS NOTE ADULT - SUBJECTIVE AND OBJECTIVE BOX
Physical Medicine and Rehabilitation Progress Note :       Patient is a 88y old  Male who presents with a chief complaint of Failure to thrive (29 Feb 2024 08:47)      HPI:  88M PMH squamous cell lung cancer, BPH, peripheral neuropathy, and anxiety presenting with progressive weakness, dyspnea on exertion, fatigue, and weight loss x2 weeks. Pt states he was diagnosed with lung cancer around 4 months ago (10/2023) after presenting to his PCP with hemoptysis. Follows with Dr Lawrence outpatient, who he most recently saw on 1/17/24, thoracentesis was done and pleurx was placed for large R sided pleural effusion. During that visit, pt also complained of fatigue and loss of appetite. However, reports that these symptoms have rapidly progressed over the past 2 weeks, during which he lost almost 20 pounds. Pt lives alone in an apartment in [place], without any HHA, however has home PT. He called his PCP Dr Hernandez on Monday 2/19/24 due to these sx, and was instructed to present to ER. ROS +urinary frequency, nocturia, constipation, night sweats, and productive cough with brown/green phlegm. Reports having night sweats for >1 year, noticed that he wets several bedsheets. Denies pain on urination and states polyuria is due to his prostate issues. He was reportedly supposed to start radiation therapy for his lung cancer, however has not received any treatment thus far. Follows with rad onc Dr. Hubbard at [facility].     Pt is a prior smoker, quit in 1976 after smoking for 20 years. He reports he has a sister named Marion in [place], but no friends or family in the area. Does not have official HCP, however he states it would probably be his sister. States that he would like all interventions done, or "whatever is convenient at that time."    ED Course  VS T 98.7 HR 60 /78 RR 17  Labs WBC 24.51 Hgb 9.8 Plt 441 Neut 93.9% Na 133 BUN 25 Cr 0.96   UA Large blood,   Imaging CXR w/ R sided pleural effusion  Interventions 1000cc NS x1     (20 Feb 2024 19:34)                            8.4    29.15 )-----------( 367      ( 29 Feb 2024 05:30 )             27.0       02-29    132<L>  |  97  |  18  ----------------------------<  117<H>  3.9   |  25  |  0.84    Ca    7.7<L>      29 Feb 2024 05:30  Phos  2.6     02-29  Mg     1.9     02-29    TPro  4.8<L>  /  Alb  1.9<L>  /  TBili  0.2  /  DBili  x   /  AST  26  /  ALT  40  /  AlkPhos  150<H>  02-29    Vital Signs Last 24 Hrs  T(C): 36.7 (29 Feb 2024 06:28), Max: 37.4 (28 Feb 2024 12:21)  T(F): 98.1 (29 Feb 2024 06:28), Max: 99.4 (28 Feb 2024 21:00)  HR: 89 (29 Feb 2024 06:28) (84 - 95)  BP: 106/64 (29 Feb 2024 06:28) (95/53 - 106/64)  BP(mean): --  RR: 16 (29 Feb 2024 06:28) (16 - 18)  SpO2: 97% (29 Feb 2024 06:28) (96% - 97%)    Parameters below as of 29 Feb 2024 06:28  Patient On (Oxygen Delivery Method): room air        MEDICATIONS  (STANDING):  atorvastatin 10 milliGRAM(s) Oral at bedtime  doxazosin 4 milliGRAM(s) Oral at bedtime  finasteride 5 milliGRAM(s) Oral daily  heparin   Injectable 5000 Unit(s) SubCutaneous every 8 hours  pantoprazole    Tablet 40 milliGRAM(s) Oral before breakfast  piperacillin/tazobactam IVPB.. 4.5 Gram(s) IV Intermittent every 8 hours    MEDICATIONS  (PRN):  acetaminophen     Tablet .. 650 milliGRAM(s) Oral every 6 hours PRN Temp greater or equal to 38C (100.4F), Mild Pain (1 - 3)  melatonin 3 milliGRAM(s) Oral at bedtime PRN Sleep      2/28/2024 Functional Status Assessment :         Pain Assessment/Number Scale (0-10) Adult  Presence of Pain: denies pain/discomfort (Rating = 0)  Pain Rating (0-10): Rest: 0 (no pain/absence of nonverbal indicators of pain)  Pain Rating (0-10): Activity: 0 (no pain/absence of nonverbal indicators of pain)    Safety      AM-PAC Functional Assessment: Basic Mobility  Type of Assessment: Daily assessment  Turning from your back to your side while in a flat bed without using bedrails?: 2 = A lot of assistance  Moving from lying on your back to sitting on the flat side of a flat bed without using bedrails?: 2 = A lot of assistance  Moving to and from a bed to a chair (including a wheelchair)?: 2 = A lot of assistance  Standing up from a chair using your arms (e.g. wheelchair or bedside chair)?: 2 = A lot of assistance  Walking in hospital room?: 2 = A lot of assistance  Climbing 3-5 steps with a railing?: 2-calculated by average  Score: 12   Row Comment: Ask the patient "How much help from another person do you currently need? (If the patient hasn't done an activity recently, how much help from another person do you think he/she needs if he/she tried?)    Cognitive/Neuro      Language Assistance  Preferred Language to Address Healthcare Preferred Language to Address Healthcare: English    Therapeutic Interventions      Therapeutic Exercise  Therapeutic Exercise Detail: Pt participated in supine ankle pumps, knee extension with quad set, straight leg raise with glue sets, bridges and shoulder flexion through full range.           PM&R Impression : as above    Current disposition plan recommendation :    subacute rehab placement

## 2024-02-29 NOTE — PROGRESS NOTE ADULT - PROBLEM SELECTOR PLAN 2
Patient meeting SIRS criteria (HR >90 and Leukocytosis. His leukocytosis is worsening from admission. Likely iso parapneumonic effusion.  s/p MIST II protocol and Vancomycin (d/c'd 2/25)   Plan:  - Trend WBC  - Continue with IV Zosyn while inpatient,  - Plan for Augmentin on discharge as there is low likelihood of pseudomonas infection at this time. Patient meeting SIRS criteria (HR >90 and Leukocytosis. His leukocytosis is worsening from admission. Likely iso parapneumonic effusion.  s/p MIST II protocol and Vancomycin (d/c'd 2/25)   Plan:  - Trend WBC  - Continue with IV Zosyn while inpatient, will discharge on augmentin to complete 14 days.

## 2024-02-29 NOTE — PROCEDURE NOTE - NSUS ED ADDITIONAL DETAIL1 FT
Small loculated right sided pleural effusion. Fluid drained via pleurx, trace amount remaining.
Small but loculated right sided pleural effusion
Trace amount of residual effusion on right side with consolidated lung. Likely trapped lung. Small to moderate left sided effusion, simple appearing. B lines anteriorly b/l.
B lines b/l anteriorly. Bilateral lung sliding. Irregular pleura on right. Bilateral, small to moderate, simple appearing pleural effusions. Right lower lobe consolidation with static air bronchograms.
dense RLL consolidation. Pleural effusion on R, small, with sediment

## 2024-02-29 NOTE — PROGRESS NOTE ADULT - NUTRITIONAL ASSESSMENT
This patient has been assessed with a concern for Malnutrition and has been determined to have a diagnosis/diagnoses of Severe protein-calorie malnutrition and Underweight (BMI < 19).    This patient is being managed with:   Diet Regular-  Supplement Feeding Modality:  Oral  Ensure Enlive Cans or Servings Per Day:  1       Frequency:  Two Times a day  Entered: Feb 22 2024  5:03AM  

## 2024-02-29 NOTE — PROCEDURE NOTE - NSUS ED PROCEDURE ASSISTED BY
Assistance was available
The procedure was performed independently
Assistance was available

## 2024-02-29 NOTE — PROCEDURE NOTE - NSUSFINDINGS_ED_ALL
Right Effusion
Right Effusion/Left Effusion
Right Effusion
Right Effusion
Right Effusion/Left Effusion

## 2024-02-29 NOTE — PROCEDURE NOTE - NSPROCNAME_GEN_A_CORE
Point of Care Ultrasound Lung
Urinary Device Placement

## 2024-02-29 NOTE — PROGRESS NOTE ADULT - PROBLEM SELECTOR PROBLEM 1
Squamous cell lung cancer
Failure to thrive in adult
Squamous cell lung cancer
Failure to thrive in adult

## 2024-02-29 NOTE — PROGRESS NOTE ADULT - PROBLEM SELECTOR PLAN 9
Last seen urologist prior to COVID, per HIE was seen in 2017. Takes terazosin 5mg and finasteride 5mg at home. Reports chronic nocturia and polyuria that has not particularly worsened.  - c/w home meds  - TOV tomorrow Hgb on presentation 9.8, last hgb 11.7 1/2024. Pt denies hematuria, hematemesis, hemoptysis, or melena. +constipation. UA +RBC and large blood. Likely AOCD and nutritional deficiencies i/s/o progressing lung cancer and significant weight loss.  Iron studies noted  Plan:  - maintain active T&S  - Transfuse if Hgb <7

## 2024-02-29 NOTE — PROGRESS NOTE ADULT - ASSESSMENT
88M PMH squamous cell lung cancer, BPH, peripheral neuropathy, and anxiety presenting with progressive weakness, dyspnea on exertion, fatigue, and weight loss x2 weeks. Pulmonary consulted for known MPE and IPC.     #Malignant Pleural Effusion s/p IPC placement   #SCC of the Lung, Stage IIIA     Presenting with failure to thrive in the setting of known malignancy which has yet to be treated. Pulmonary consulted for known MPE s/p IPC placement by Dr. Lawrence. Currently on room air saturating well. Has leukocytosis, but otherwise no focal infectious symptoms. On bedside US, right sided small pleural effusion with visible septations. Per Dr. Lawrence who placed pleurx, effusion was septated when pleurx was initially placed one month ago. Initially unable to drain pleurx, instilled 5 mg tPA and now draining freely. Minimal output and cell count shows 95% neutrophils. Glucose 14 and LDH >2500, concerning for complicated parapneumonic effusion. Pt now s/p MIST2 protocol, effusion drained and pleurx disconnected from chest tube. Will resume regular drainage schedule based off US assessment of pleural space. CT chest/abd/pelvis done and read as showing post-treatment changes, but pt has not yet received treatment. It also shows subcarinal and right hilar LAD and b/l pleural effusion with right sided hydropneumothorax. Bedside US 2/28 shows bilateral B lines and bilateral simple appearing, small to moderate pleural effusions. WBC continue to rise but pt afebrile and saturating well on room air. Does note some right sided chest pain. PleurX drained 2/28, 300 cc drained before pt experienced worsening pain. Given B lines and new b/l effusions, would check cardiac function with echo and trial diuresis.    Recommendations:  -can transition abx to levaquin, complete one more week on levaquin  -would trial diuresis  -f/u TTE  -f/u pleural fluid cyto, if negative pt would need IR biopsy pending his goals of care  -can drain pleurx once weekly    Please call pulmonology back with further questions or change in clinical status.    Case s/e/d with Dr. Stewart

## 2024-02-29 NOTE — PROCEDURE NOTE - NSUSCPTCODES_ED_ALL
92794 US Chest (PTX, Pleural Effussion/CHF vs COPD)
74242 US Chest (PTX, Pleural Effussion/CHF vs COPD)
[id number] US Chest (PTX, Pleural Effussion/CHF vs COPD)
[id number] US Chest (PTX, Pleural Effussion/CHF vs COPD)
58877 US Chest (PTX, Pleural Effussion/CHF vs COPD)

## 2024-02-29 NOTE — PROGRESS NOTE ADULT - PROBLEM SELECTOR PLAN 8
Hgb on presentation 9.8, last hgb 11.7 1/2024. Pt denies hematuria, hematemesis, hemoptysis, or melena. +constipation. UA +RBC and large blood. Likely AOCD and nutritional deficiencies i/s/o progressing lung cancer and significant weight loss.  Iron studies noted  Plan:  - maintain active T&S  - Transfuse if Hgb <7 BMI 18, >20 pound weight loss over past 2 weeks.  - nutrition consulted and recommended regular diet with Ensure Enlive twice per day

## 2024-02-29 NOTE — PROGRESS NOTE ADULT - PROBLEM SELECTOR PLAN 4
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 Follows with Dr Lawrence and Dr Fletcher (oncologist). s/p bronchoscopy, pleurx placement, and thoracentesis outpatient. EBUS FNA bx and BAL 11/2023 +malignant cells. RLL bx +SCC. Pending radiation treatment, last seen 12/2023 and was determined to be a good candidate for chemoradiation at that time however did not receive tx due to large pleural effusion. Now p/w FTT, progressive dyspnea, fatigue, weight loss, night sweats.  CT on 2/27: The previously noted right lower lobe mass now appears as a nonenhancing geographic area of low-attenuation, compatible with post treatment change. Extensive adjacent focal atelectasis noted surrounding   the lesion, likely posttreatment change.   New moderate bilateral pleural effusions with right hydropneumothorax. Chest tube tip not positioned within the pneumothorax itself.    Plan:  - palliative consulted to aid in decision about possible chemo/radiation therapy  - f/u pulm recs  - f/u outpatient with pulm, Palliative, PCP and Heme/onc  - encourage PO intake. 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 Follows with Dr Lawrence and Dr Fletcher (oncologist). s/p bronchoscopy, pleurx placement, and thoracentesis outpatient. EBUS FNA bx and BAL 11/2023 +malignant cells. RLL bx +SCC. Pending radiation treatment, last seen 12/2023 and was determined to be a good candidate for chemoradiation at that time however did not receive tx due to large pleural effusion. Now p/w FTT, progressive dyspnea, fatigue, weight loss, night sweats.  CT on 2/27: The previously noted right lower lobe mass now appears as a nonenhancing geographic area of low-attenuation, compatible with post treatment change. Extensive adjacent focal atelectasis noted surrounding   the lesion, likely posttreatment change.   New moderate bilateral pleural effusions with right hydropneumothorax. Chest tube tip not positioned within the pneumothorax itself.    Plan:  - palliative consulted to aid in decision about possible chemo/radiation therapy  - Pleurx Drainage weekly  - f/u outpatient with pulm, Palliative, PCP and Heme/onc  - encourage PO intake.

## 2024-02-29 NOTE — PROGRESS NOTE ADULT - ASSESSMENT
I M    88 y o M PMH lung cancer with pleural effusions s/p Pleurx placement prior to this hospitalization, BPH, neuropathy, and anxiety presenting with progressive weakness, dyspnea on exertion, and inability to care for self at home. Admitted to [facility] for further evaluation and management of Failure to Thrive. Course c/b urinary retention needed ruth placement on 2/22 and parapneumonic effusion s/p MIST II protocol. Now recommended to [facility] by PT.     Nutritional Assessment:  · Nutritional Assessment	This patient has been assessed with a concern for Malnutrition and has been determined to have a diagnosis/diagnoses of Severe protein-calorie malnutrition and Underweight (BMI < 19).    This patient is being managed with:   Diet Regular-  Supplement Feeding Modality:  Oral  Ensure Enlive Cans or Servings Per Day:  1       Frequency:  Two Times a day  Entered: Feb 22 2024  5:03AM    Problem/Plan - 1:  ·  Problem: Failure to thrive in adult.   ·  Plan: I/s/o multiple medical problems including known malignancy, chronic gait instability. Progressive weight loss since lung cancer diagnosis, however reports >20 pound weight loss over past 2 weeks. Lives alone and unable to cook for self as becomes fatigued and has unstable gait.   Plan:  - regular diet  - nutrition consulted  - PT evaluated and recommended [facility], has placement   - palliative on board, f/u recs.    Problem/Plan - 2:  ·  Problem: Sepsis.   ·  Plan: Patient meeting SIRS criteria (HR >90 and Leukocytosis. His leukocytosis is worsening from admission. Likely iso parapneumonic effusion.  s/p MIST II protocol and Vancomycin (d/c'd 2/25)   Plan:  - Trend WBC  - Continue with IV Zosyn while inpatient,  - Plan for Augmentin on discharge as there is low likelihood of pseudomonas infection at this time.    Problem/Plan - 3:  ·  Problem: Urinary retention.   ·  Plan: Patient with Hx of BPH on medications outpatient. BS today showed 1.7L of urine in bladder. Trial of straight cath was unsuccessful therefore urology was called for Ruth placement. 1.5L was removed from bladder.   Urine output appropriate after the large amount of urine extracted during ruth placement    Plan:  - Continue Ruth for at least 3 days per urology recs  - TOV at [facility].    Problem/Plan - 4:  ·  Problem: Squamous cell lung cancer.   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 Follows with Dr Lawrence and Dr Fletcher (oncologist). s/p bronchoscopy, pleurx placement, and thoracentesis outpatient. EBUS FNA bx and BAL 11/2023 +malignant cells. RLL bx +SCC. Pending radiation treatment, last seen 12/2023 and was determined to be a good candidate for chemoradiation at that time however did not receive tx due to large pleural effusion. Now p/w FTT, progressive dyspnea, fatigue, weight loss, night sweats.  CT on 2/27: The previously noted right lower lobe mass now appears as a nonenhancing geographic area of low-attenuation, compatible with post treatment change. Extensive adjacent focal atelectasis noted surrounding   the lesion, likely posttreatment change.   New moderate bilateral pleural effusions with right hydropneumothorax. Chest tube tip not positioned within the pneumothorax itself.    Plan:  - palliative consulted to aid in decision about possible chemo/radiation therapy  - f/u pulm recs  - f/u outpatient with pulm, Palliative, PCP and Heme/onc  - encourage PO intake.    Problem/Plan - 5:  ·  Problem: Pleural effusion.   ·  Plan: pulm following. broad abx. dornase. cultures ngtd. fluisd c/w parapnemonic effusion.    - As above.    Problem/Plan - 6:  ·  Problem: Constipation.   ·  Plan: IMPROVING  The patient was complaining of constipation overnight. Started on Miralax, however, per nursing, the patient was given senna and had a large bowel movement, therefore, Miralax was held.   He is complaining of abdominal discomfort at this time, improved from admission  Abdominal xray with stool burden.  Plan:  - c/w senna at night, patient refused overnight and simethicone was given  - Miralax PRN.    Problem/Plan - 7:  ·  Problem: Protein calorie malnutrition.   ·  Plan: BMI 18, >20 pound weight loss over past 2 weeks.  - nutrition consulted and recommended regular diet with Ensure Enlive twice per day.    Problem/Plan - 8:  ·  Problem: Normocytic anemia.   ·  Plan: Hgb on presentation 9.8, last hgb 11.7 1/2024. Pt denies hematuria, hematemesis, hemoptysis, or melena. +constipation. UA +RBC and large blood. Likely AOCD and nutritional deficiencies i/s/o progressing lung cancer and significant weight loss.  Iron studies noted  Plan:  - maintain active T&S  - Transfuse if Hgb <7.    Problem/Plan - 9:  ·  Problem: History of BPH.   ·  Plan: Last seen urologist prior to COVID, per KAMILA was seen in 2017. Takes terazosin 5mg and finasteride 5mg at home. Reports chronic nocturia and polyuria that has not particularly worsened.  - c/w home meds  - TOV tomorrow.    Problem/Plan - 10:  ·  Problem: Prophylactic measure.   ·  Plan; F: Encourage PO  E: K>4 Mg>2  N: Regular  GI: None  DVT: Lovenox 30 subq  Dispo: [facility] per PM&R

## 2024-02-29 NOTE — PROGRESS NOTE ADULT - ASSESSMENT
88M PMH lung cancer with pleural effusions s/p Pleurx placement prior to this hospitalization, BPH, neuropathy, and anxiety presenting with progressive weakness, dyspnea on exertion, and inability to care for self at home. Admitted to [facility] for further evaluation and management of Failure to Thrive. Course c/b urinary retention needed ruth placement on 2/22 and parapneumonic effusion s/p MIST II protocol. Now recommended to [facility] by PT.

## 2024-02-29 NOTE — PROGRESS NOTE ADULT - REASON FOR ADMISSION
Failure to thrive

## 2024-03-02 LAB — NON-GYNECOLOGICAL CYTOLOGY STUDY: SIGNIFICANT CHANGE UP

## 2024-03-03 ENCOUNTER — NON-APPOINTMENT (OUTPATIENT)
Age: 89
End: 2024-03-03

## 2024-03-04 ENCOUNTER — APPOINTMENT (OUTPATIENT)
Dept: PALLIATIVE MEDICINE | Facility: CLINIC | Age: 89
End: 2024-03-04

## 2024-03-04 NOTE — HISTORY OF PRESENT ILLNESS
[FreeTextEntry1] : 89 yo man with right lung Stage III SCC Right lower lobe here for palliative care support.   Onc Hx: In mid-2023, patient presented with new symptoms of difficulty breathing and chest pain. He was referred for a CT scan of the chest.  10/9/23 CT chest showed: There is right lower lobe subpleural consolidation measuring 4.0 cm. Differential diagnosis includes malignancy and pneumonia. There are also likely enlarged right hilar lymph nodes. Recommend PET/CT or short-term follow-up imaging in 4-6 weeks after treatment to assess for resolution.  10/27/23 PET 1. Hypermetabolic partially cavitary right lower lobe pulmonary mass, which is highly suspicious for a primary bronchogenic malignancy. If biopsy is planned, the most intense activity is in the medial aspect of this mass, and this area should be targeted. 2. Emphysematous changes of the lungs with additional small pulmonary nodules, which are likely below the size resolution of PET imaging. Attention to these nodules on follow-up examinations would be helpful in further evaluation. 3. Hypermetabolic mediastinal and right hilar lymph nodes, which are highly suspicious for metastatic viv disease. 4. Otherwise, no abnormal hypermetabolic activity to suggest additional site malignancy. 5. Cholelithiasis. 6. Subcentimeter low-attenuation lesions in the liver, which are too small to further characterize. Attention to these findings on follow-up examinations would be helpful in further evaluation. 7. Markedly enlarged prostate with imaging evidence consistent with bladder outlet obstruction. 8. Multiple bladder calculi.  11/28/23: EBUS guided FNA Final Diagnosis Lung, right lower lobe; biopsy: - Squamous cell carcinoma, keratinizing type - See Note. Note: PD-L1 is pending on the concurrent cytology specimen, [id number]. LYMPH NODE, 11RI, EBUS-GUIDED FNA POSITIVE FOR MALIGNANT CELLS. Metastatic squamous cell carcinoma, with keratinizing features LYMPH NODE,7, EBUS-GUIDED FNA POSITIVE FOR MALIGNANT CELLS. Metastatic squamous cell carcinoma with keratinizing features. LYMPH NODE,4R, EBUS-GUIDED FNA NEGATIVE FOR MALIGNANT CELLS. BRONCHOALVEOLAR LAVAGE, RIGHT,LOWER LOBE POSITIVE FOR MALIGNANT CELLS. Keratinizing squamous cell carcinoma.  The patient was evaluated by Dr. Hubbard, who recommended definitive CRT given the patient's N2 disease. 2/20/24-2/29/24 - Admission at [facility] for dyspnea and FTT. Found to have recurrence of pleural effusion, underwent MIST protocol (had pleurX placed previously). [facility] to [facility]  Providers: Oncologist - Dr. Hubbard ([facility])  SYMPTOMS: #Pain Location - Quality - Radiation - Timing - Aggravating factors - Minimal acceptable level (0-10 scale) - Severity in last 24h (0-10 scale) - Current score (0-10 scale) -  ISTOP Ref #: [id number]   #Poor appetite #N/V #Anxiety #Depression #Constipation #Insomnia     ROS: If [ ] blank, symptom not present Fatigue [ ] Nausea [ ] Loss of appetite [ ] Unintentional weight loss [ ] Constipation [ ] Diarrhea [ ] Anxiety [ ] Low mood [ ] Other symptoms: [x ] All other review of symptoms negative   SH:   Advanced Directives: HCP ZEYNEP

## 2024-03-06 DIAGNOSIS — Z87.891 PERSONAL HISTORY OF NICOTINE DEPENDENCE: ICD-10-CM

## 2024-03-06 DIAGNOSIS — K59.00 CONSTIPATION, UNSPECIFIED: ICD-10-CM

## 2024-03-06 DIAGNOSIS — C34.90 MALIGNANT NEOPLASM OF UNSPECIFIED PART OF UNSPECIFIED BRONCHUS OR LUNG: ICD-10-CM

## 2024-03-06 DIAGNOSIS — A41.9 SEPSIS, UNSPECIFIED ORGANISM: ICD-10-CM

## 2024-03-06 DIAGNOSIS — E43 UNSPECIFIED SEVERE PROTEIN-CALORIE MALNUTRITION: ICD-10-CM

## 2024-03-06 DIAGNOSIS — G62.9 POLYNEUROPATHY, UNSPECIFIED: ICD-10-CM

## 2024-03-06 DIAGNOSIS — R35.0 FREQUENCY OF MICTURITION: ICD-10-CM

## 2024-03-06 DIAGNOSIS — R35.1 NOCTURIA: ICD-10-CM

## 2024-03-06 DIAGNOSIS — R53.81 OTHER MALAISE: ICD-10-CM

## 2024-03-06 DIAGNOSIS — R62.7 ADULT FAILURE TO THRIVE: ICD-10-CM

## 2024-03-06 DIAGNOSIS — D64.9 ANEMIA, UNSPECIFIED: ICD-10-CM

## 2024-03-06 DIAGNOSIS — R33.8 OTHER RETENTION OF URINE: ICD-10-CM

## 2024-03-06 DIAGNOSIS — N40.1 BENIGN PROSTATIC HYPERPLASIA WITH LOWER URINARY TRACT SYMPTOMS: ICD-10-CM

## 2024-03-06 DIAGNOSIS — C34.31 MALIGNANT NEOPLASM OF LOWER LOBE, RIGHT BRONCHUS OR LUNG: ICD-10-CM

## 2024-03-06 DIAGNOSIS — N17.9 ACUTE KIDNEY FAILURE, UNSPECIFIED: ICD-10-CM

## 2024-03-06 DIAGNOSIS — J91.0 MALIGNANT PLEURAL EFFUSION: ICD-10-CM

## 2024-03-06 DIAGNOSIS — F41.9 ANXIETY DISORDER, UNSPECIFIED: ICD-10-CM

## 2024-03-14 ENCOUNTER — NON-APPOINTMENT (OUTPATIENT)
Age: 89
End: 2024-03-14

## 2024-03-17 VITALS
DIASTOLIC BLOOD PRESSURE: 68 MMHG | SYSTOLIC BLOOD PRESSURE: 109 MMHG | OXYGEN SATURATION: 100 % | HEIGHT: 67 IN | RESPIRATION RATE: 16 BRPM | TEMPERATURE: 98 F | HEART RATE: 98 BPM

## 2024-03-17 LAB
ACANTHOCYTES BLD QL SMEAR: SLIGHT — SIGNIFICANT CHANGE UP
ANION GAP SERPL CALC-SCNC: 8 MMOL/L — SIGNIFICANT CHANGE UP (ref 5–17)
ANISOCYTOSIS BLD QL: SLIGHT — SIGNIFICANT CHANGE UP
APTT BLD: 39.4 SEC — HIGH (ref 24.5–35.6)
BASOPHILS # BLD AUTO: 0 K/UL — SIGNIFICANT CHANGE UP (ref 0–0.2)
BASOPHILS NFR BLD AUTO: 0 % — SIGNIFICANT CHANGE UP (ref 0–2)
BUN SERPL-MCNC: 18 MG/DL — SIGNIFICANT CHANGE UP (ref 7–23)
BURR CELLS BLD QL SMEAR: PRESENT — SIGNIFICANT CHANGE UP
CALCIUM SERPL-MCNC: 9.1 MG/DL — SIGNIFICANT CHANGE UP (ref 8.4–10.5)
CHLORIDE SERPL-SCNC: 98 MMOL/L — SIGNIFICANT CHANGE UP (ref 96–108)
CO2 SERPL-SCNC: 28 MMOL/L — SIGNIFICANT CHANGE UP (ref 22–31)
CREAT SERPL-MCNC: 0.89 MG/DL — SIGNIFICANT CHANGE UP (ref 0.5–1.3)
DACRYOCYTES BLD QL SMEAR: SLIGHT — SIGNIFICANT CHANGE UP
EGFR: 82 ML/MIN/1.73M2 — SIGNIFICANT CHANGE UP
ELLIPTOCYTES BLD QL SMEAR: SLIGHT — SIGNIFICANT CHANGE UP
EOSINOPHIL # BLD AUTO: 0 K/UL — SIGNIFICANT CHANGE UP (ref 0–0.5)
EOSINOPHIL NFR BLD AUTO: 0 % — SIGNIFICANT CHANGE UP (ref 0–6)
GIANT PLATELETS BLD QL SMEAR: PRESENT — SIGNIFICANT CHANGE UP
GLUCOSE SERPL-MCNC: 108 MG/DL — HIGH (ref 70–99)
HCT VFR BLD CALC: 28.6 % — LOW (ref 39–50)
HGB BLD-MCNC: 8.7 G/DL — LOW (ref 13–17)
HYPOCHROMIA BLD QL: SIGNIFICANT CHANGE UP
INR BLD: 1.27 — HIGH (ref 0.85–1.18)
LACTATE SERPL-SCNC: 2 MMOL/L — SIGNIFICANT CHANGE UP (ref 0.5–2)
LYMPHOCYTES # BLD AUTO: 0.58 K/UL — LOW (ref 1–3.3)
LYMPHOCYTES # BLD AUTO: 1.7 % — LOW (ref 13–44)
MACROCYTES BLD QL: SLIGHT — SIGNIFICANT CHANGE UP
MAGNESIUM SERPL-MCNC: 2 MG/DL — SIGNIFICANT CHANGE UP (ref 1.6–2.6)
MANUAL SMEAR VERIFICATION: SIGNIFICANT CHANGE UP
MCHC RBC-ENTMCNC: 24.9 PG — LOW (ref 27–34)
MCHC RBC-ENTMCNC: 30.4 GM/DL — LOW (ref 32–36)
MCV RBC AUTO: 81.9 FL — SIGNIFICANT CHANGE UP (ref 80–100)
MONOCYTES # BLD AUTO: 1.49 K/UL — HIGH (ref 0–0.9)
MONOCYTES NFR BLD AUTO: 4.4 % — SIGNIFICANT CHANGE UP (ref 2–14)
NEUTROPHILS # BLD AUTO: 31.5 K/UL — HIGH (ref 1.8–7.4)
NEUTROPHILS NFR BLD AUTO: 91.2 % — HIGH (ref 43–77)
NEUTS BAND # BLD: 1.8 % — SIGNIFICANT CHANGE UP (ref 0–8)
OVALOCYTES BLD QL SMEAR: SLIGHT — SIGNIFICANT CHANGE UP
PLAT MORPH BLD: ABNORMAL
PLATELET # BLD AUTO: 474 K/UL — HIGH (ref 150–400)
POIKILOCYTOSIS BLD QL AUTO: SLIGHT — SIGNIFICANT CHANGE UP
POLYCHROMASIA BLD QL SMEAR: SLIGHT — SIGNIFICANT CHANGE UP
POTASSIUM SERPL-MCNC: 4.1 MMOL/L — SIGNIFICANT CHANGE UP (ref 3.5–5.3)
POTASSIUM SERPL-SCNC: 4.1 MMOL/L — SIGNIFICANT CHANGE UP (ref 3.5–5.3)
PROTHROM AB SERPL-ACNC: 14.4 SEC — HIGH (ref 9.5–13)
RBC # BLD: 3.49 M/UL — LOW (ref 4.2–5.8)
RBC # FLD: 19 % — HIGH (ref 10.3–14.5)
RBC BLD AUTO: ABNORMAL
SCHISTOCYTES BLD QL AUTO: SLIGHT — SIGNIFICANT CHANGE UP
SMUDGE CELLS # BLD: PRESENT — SIGNIFICANT CHANGE UP
SODIUM SERPL-SCNC: 134 MMOL/L — LOW (ref 135–145)
TARGETS BLD QL SMEAR: SLIGHT — SIGNIFICANT CHANGE UP
VARIANT LYMPHS # BLD: 0.9 % — SIGNIFICANT CHANGE UP (ref 0–6)
WBC # BLD: 33.87 K/UL — HIGH (ref 3.8–10.5)
WBC # FLD AUTO: 33.87 K/UL — HIGH (ref 3.8–10.5)

## 2024-03-17 PROCEDURE — 71045 X-RAY EXAM CHEST 1 VIEW: CPT | Mod: 26

## 2024-03-17 PROCEDURE — 99285 EMERGENCY DEPT VISIT HI MDM: CPT | Mod: FS

## 2024-03-17 NOTE — ED ADULT TRIAGE NOTE - CHIEF COMPLAINT QUOTE
Sent in from NH for elevated WBC.  Pt states "they think the drainage site from my R lower lung is infected".

## 2024-03-17 NOTE — ED ADULT NURSE NOTE - NSFALLFACTORS_ED_ALL_ED
Subjective:      Helena Sousa is a 17 m.o. female here with father. Patient brought in for Fever      History of Present Illness:  Started with fever up to 100.2 two afternoons ago, also with slight cough that day, but not much since, maybe a couple of times; fever went up to 103 last night; slight congestion and runny nose as well; appetite ok; sleeping a little more yesterday and last night; also notices some small nodes on back of head the past couple of days; no vomiting; one looser stool today;         Review of Systems   Constitutional: Positive for fever. Negative for activity change, appetite change, fatigue and irritability.   HENT: Positive for congestion. Negative for ear discharge, ear pain, rhinorrhea, sore throat and trouble swallowing.    Eyes: Negative.  Negative for pain, discharge and visual disturbance.   Respiratory: Positive for cough.    Cardiovascular: Negative.  Negative for chest pain.   Gastrointestinal: Positive for diarrhea. Negative for abdominal pain, constipation, nausea and vomiting.   Genitourinary: Negative.  Negative for difficulty urinating, dysuria and vaginal discharge.   Musculoskeletal: Negative.  Negative for arthralgias and myalgias.   Skin: Negative.  Negative for rash.   Neurological: Negative.  Negative for weakness and headaches.   Hematological: Negative for adenopathy.   Psychiatric/Behavioral: Negative.  Negative for behavioral problems and sleep disturbance.   All other systems reviewed and are negative.      Objective:     Physical Exam   Constitutional: Vital signs are normal. She appears well-developed and well-nourished. She is active, playful and cooperative.  Non-toxic appearance. She does not appear ill. No distress.   HENT:   Head: Normocephalic and atraumatic.   Right Ear: Tympanic membrane, external ear and canal normal.   Left Ear: Tympanic membrane, external ear and canal normal.   Nose: Nose normal. No rhinorrhea, nasal discharge or congestion.    Mouth/Throat: Mucous membranes are moist. Dentition is normal. No oropharyngeal exudate or pharynx erythema. No tonsillar exudate. Oropharynx is clear. Pharynx is normal.   Eyes: Conjunctivae and EOM are normal. Pupils are equal, round, and reactive to light. Right eye exhibits no discharge. Left eye exhibits no discharge. Right conjunctiva is not injected. Left conjunctiva is not injected.   Neck: Normal range of motion. Neck supple. No neck rigidity or neck adenopathy. No tenderness is present.   Cardiovascular: Normal rate, regular rhythm, S1 normal and S2 normal. Pulses are palpable.   No murmur heard.  Pulmonary/Chest: Effort normal and breath sounds normal. No nasal flaring, stridor or grunting. No respiratory distress. She has no wheezes. She has no rhonchi. She has no rales. She exhibits no retraction.   Abdominal: Soft. Bowel sounds are normal. She exhibits no distension and no mass. There is no hepatosplenomegaly. There is no tenderness. There is no rebound and no guarding. No hernia.   Musculoskeletal: Normal range of motion.   Lymphadenopathy: No anterior cervical adenopathy or posterior cervical adenopathy. No supraclavicular adenopathy is present.   Neurological: She is alert.   Skin: Skin is warm and dry. No petechiae, no purpura and no rash noted. She is not diaphoretic. No cyanosis. No jaundice or pallor.   Nursing note and vitals reviewed.      Assessment:        1. Fever, unspecified fever cause         Plan:     Fever, unspecified fever cause  -     Urinalysis; Future    ok to rx fevers  Fluids  Further plans pending urine  RTC if sxs worsen or persist, or develops new sxs     Weakness

## 2024-03-17 NOTE — ED ADULT NURSE NOTE - OBJECTIVE STATEMENT
89 yo male c/o "needing fluid drained from his lung". BIBEMS from nursing home for elevated WBC. [facility] pt recently had fluid drained from his lung. Pt has bandage to R ribcage with small amount of fluid visible on dressing. Denies CP, SOB, dizziness, pain, or any other complaints at this time. Pt AOx3, mildly disoriented to situation. NAD.

## 2024-03-18 ENCOUNTER — INPATIENT (INPATIENT)
Facility: HOSPITAL | Age: 89
LOS: 7 days | Discharge: HOSPICE | DRG: 871 | End: 2024-03-26
Attending: STUDENT IN AN ORGANIZED HEALTH CARE EDUCATION/TRAINING PROGRAM | Admitting: INTERNAL MEDICINE
Payer: MEDICARE

## 2024-03-18 DIAGNOSIS — Z98.890 OTHER SPECIFIED POSTPROCEDURAL STATES: Chronic | ICD-10-CM

## 2024-03-18 DIAGNOSIS — Z29.9 ENCOUNTER FOR PROPHYLACTIC MEASURES, UNSPECIFIED: ICD-10-CM

## 2024-03-18 DIAGNOSIS — D50.9 IRON DEFICIENCY ANEMIA, UNSPECIFIED: ICD-10-CM

## 2024-03-18 DIAGNOSIS — E46 UNSPECIFIED PROTEIN-CALORIE MALNUTRITION: ICD-10-CM

## 2024-03-18 DIAGNOSIS — Z87.891 PERSONAL HISTORY OF NICOTINE DEPENDENCE: ICD-10-CM

## 2024-03-18 DIAGNOSIS — J90 PLEURAL EFFUSION, NOT ELSEWHERE CLASSIFIED: ICD-10-CM

## 2024-03-18 DIAGNOSIS — A41.9 SEPSIS, UNSPECIFIED ORGANISM: ICD-10-CM

## 2024-03-18 DIAGNOSIS — D64.9 ANEMIA, UNSPECIFIED: ICD-10-CM

## 2024-03-18 DIAGNOSIS — D75.839 THROMBOCYTOSIS, UNSPECIFIED: ICD-10-CM

## 2024-03-18 DIAGNOSIS — R62.7 ADULT FAILURE TO THRIVE: ICD-10-CM

## 2024-03-18 DIAGNOSIS — J18.9 PNEUMONIA, UNSPECIFIED ORGANISM: ICD-10-CM

## 2024-03-18 DIAGNOSIS — C34.90 MALIGNANT NEOPLASM OF UNSPECIFIED PART OF UNSPECIFIED BRONCHUS OR LUNG: ICD-10-CM

## 2024-03-18 DIAGNOSIS — Z87.438 PERSONAL HISTORY OF OTHER DISEASES OF MALE GENITAL ORGANS: ICD-10-CM

## 2024-03-18 DIAGNOSIS — Z86.2 PERSONAL HISTORY OF DISEASES OF THE BLOOD AND BLOOD-FORMING ORGANS AND CERTAIN DISORDERS INVOLVING THE IMMUNE MECHANISM: ICD-10-CM

## 2024-03-18 DIAGNOSIS — F41.9 ANXIETY DISORDER, UNSPECIFIED: ICD-10-CM

## 2024-03-18 DIAGNOSIS — Z87.898 PERSONAL HISTORY OF OTHER SPECIFIED CONDITIONS: ICD-10-CM

## 2024-03-18 LAB
ADD ON TEST-SPECIMEN IN LAB: SIGNIFICANT CHANGE UP
ALBUMIN FLD-MCNC: 1 G/DL — SIGNIFICANT CHANGE UP
ALBUMIN SERPL ELPH-MCNC: 2 G/DL — LOW (ref 3.3–5)
ALP SERPL-CCNC: 161 U/L — HIGH (ref 40–120)
ALT FLD-CCNC: 10 U/L — SIGNIFICANT CHANGE UP (ref 10–45)
ANION GAP SERPL CALC-SCNC: 9 MMOL/L — SIGNIFICANT CHANGE UP (ref 5–17)
APTT BLD: 32.5 SEC — SIGNIFICANT CHANGE UP (ref 24.5–35.6)
AST SERPL-CCNC: 10 U/L — SIGNIFICANT CHANGE UP (ref 10–40)
B PERT IGG+IGM PNL SER: SIGNIFICANT CHANGE UP
BASOPHILS # BLD AUTO: 0.08 K/UL — SIGNIFICANT CHANGE UP (ref 0–0.2)
BASOPHILS NFR BLD AUTO: 0.2 % — SIGNIFICANT CHANGE UP (ref 0–2)
BILIRUB SERPL-MCNC: 0.4 MG/DL — SIGNIFICANT CHANGE UP (ref 0.2–1.2)
BLD GP AB SCN SERPL QL: NEGATIVE — SIGNIFICANT CHANGE UP
BUN SERPL-MCNC: 17 MG/DL — SIGNIFICANT CHANGE UP (ref 7–23)
CALCIUM SERPL-MCNC: 9.2 MG/DL — SIGNIFICANT CHANGE UP (ref 8.4–10.5)
CHLORIDE SERPL-SCNC: 97 MMOL/L — SIGNIFICANT CHANGE UP (ref 96–108)
CHOLEST FLD-MCNC: 27 MG/DL — SIGNIFICANT CHANGE UP
CO2 SERPL-SCNC: 27 MMOL/L — SIGNIFICANT CHANGE UP (ref 22–31)
COLOR FLD: SIGNIFICANT CHANGE UP
CREAT SERPL-MCNC: 0.83 MG/DL — SIGNIFICANT CHANGE UP (ref 0.5–1.3)
EGFR: 84 ML/MIN/1.73M2 — SIGNIFICANT CHANGE UP
EOSINOPHIL # BLD AUTO: 0.05 K/UL — SIGNIFICANT CHANGE UP (ref 0–0.5)
EOSINOPHIL NFR BLD AUTO: 0.1 % — SIGNIFICANT CHANGE UP (ref 0–6)
FLUID INTAKE SUBSTANCE CLASS: SIGNIFICANT CHANGE UP
GLUCOSE FLD-MCNC: <2 MG/DL — SIGNIFICANT CHANGE UP
GLUCOSE SERPL-MCNC: 117 MG/DL — HIGH (ref 70–99)
GRAM STN FLD: SIGNIFICANT CHANGE UP
HCT VFR BLD CALC: 26.7 % — LOW (ref 39–50)
HGB BLD-MCNC: 8.2 G/DL — LOW (ref 13–17)
HMPV RNA SPEC QL NAA+PROBE: DETECTED
IMM GRANULOCYTES NFR BLD AUTO: 1.5 % — HIGH (ref 0–0.9)
INR BLD: 1.2 — HIGH (ref 0.85–1.18)
LDH SERPL L TO P-CCNC: 532 U/L — HIGH (ref 50–242)
LDH SERPL L TO P-CCNC: > 2500 U/L — SIGNIFICANT CHANGE UP
LYMPHOCYTES # BLD AUTO: 1.76 K/UL — SIGNIFICANT CHANGE UP (ref 1–3.3)
LYMPHOCYTES # BLD AUTO: 5.1 % — LOW (ref 13–44)
LYMPHOCYTES # FLD: 2 % — SIGNIFICANT CHANGE UP
MAGNESIUM SERPL-MCNC: 2 MG/DL — SIGNIFICANT CHANGE UP (ref 1.6–2.6)
MCHC RBC-ENTMCNC: 24.7 PG — LOW (ref 27–34)
MCHC RBC-ENTMCNC: 30.7 GM/DL — LOW (ref 32–36)
MCV RBC AUTO: 80.4 FL — SIGNIFICANT CHANGE UP (ref 80–100)
MONOCYTES # BLD AUTO: 1.37 K/UL — HIGH (ref 0–0.9)
MONOCYTES NFR BLD AUTO: 4 % — SIGNIFICANT CHANGE UP (ref 2–14)
MONOS+MACROS # FLD: 1 % — SIGNIFICANT CHANGE UP
MRSA PCR RESULT.: NEGATIVE — SIGNIFICANT CHANGE UP
NEUTROPHILS # BLD AUTO: 30.67 K/UL — HIGH (ref 1.8–7.4)
NEUTROPHILS NFR BLD AUTO: 89.1 % — HIGH (ref 43–77)
NEUTROPHILS-BODY FLUID: 97 % — SIGNIFICANT CHANGE UP
NRBC # BLD: 0 /100 WBCS — SIGNIFICANT CHANGE UP (ref 0–0)
PH, PLEURAL FLUID: 7.57 — SIGNIFICANT CHANGE UP
PHOSPHATE SERPL-MCNC: 3 MG/DL — SIGNIFICANT CHANGE UP (ref 2.5–4.5)
PLATELET # BLD AUTO: 458 K/UL — HIGH (ref 150–400)
POTASSIUM SERPL-MCNC: 3.9 MMOL/L — SIGNIFICANT CHANGE UP (ref 3.5–5.3)
POTASSIUM SERPL-SCNC: 3.9 MMOL/L — SIGNIFICANT CHANGE UP (ref 3.5–5.3)
PROT FLD-MCNC: 2.2 G/DL — SIGNIFICANT CHANGE UP
PROT SERPL-MCNC: 4.9 G/DL — LOW (ref 6–8.3)
PROTHROM AB SERPL-ACNC: 13.6 SEC — HIGH (ref 9.5–13)
RAPID RVP RESULT: DETECTED
RBC # BLD: 3.32 M/UL — LOW (ref 4.2–5.8)
RBC # FLD: 19.7 % — HIGH (ref 10.3–14.5)
RCV VOL RI: HIGH /UL (ref 0–0)
RH IG SCN BLD-IMP: POSITIVE — SIGNIFICANT CHANGE UP
S AUREUS DNA NOSE QL NAA+PROBE: NEGATIVE — SIGNIFICANT CHANGE UP
SARS-COV-2 RNA SPEC QL NAA+PROBE: SIGNIFICANT CHANGE UP
SODIUM SERPL-SCNC: 133 MMOL/L — LOW (ref 135–145)
SPECIMEN SOURCE FLD: SIGNIFICANT CHANGE UP
SPECIMEN SOURCE: SIGNIFICANT CHANGE UP
TOTAL NUCLEATED CELL COUNT, BODY FLUID: SIGNIFICANT CHANGE UP /UL
TUBE TYPE: SIGNIFICANT CHANGE UP
WBC # BLD: 34.44 K/UL — HIGH (ref 3.8–10.5)
WBC # FLD AUTO: 34.44 K/UL — HIGH (ref 3.8–10.5)

## 2024-03-18 PROCEDURE — 99223 1ST HOSP IP/OBS HIGH 75: CPT

## 2024-03-18 PROCEDURE — 99223 1ST HOSP IP/OBS HIGH 75: CPT | Mod: GC,25

## 2024-03-18 PROCEDURE — 76604 US EXAM CHEST: CPT | Mod: 26

## 2024-03-18 PROCEDURE — 93010 ELECTROCARDIOGRAM REPORT: CPT

## 2024-03-18 PROCEDURE — 99223 1ST HOSP IP/OBS HIGH 75: CPT | Mod: GC,AI

## 2024-03-18 RX ORDER — IPRATROPIUM/ALBUTEROL SULFATE 18-103MCG
3 AEROSOL WITH ADAPTER (GRAM) INHALATION EVERY 6 HOURS
Refills: 0 | Status: DISCONTINUED | OUTPATIENT
Start: 2024-03-18 | End: 2024-03-26

## 2024-03-18 RX ORDER — LANOLIN ALCOHOL/MO/W.PET/CERES
3 CREAM (GRAM) TOPICAL AT BEDTIME
Refills: 0 | Status: DISCONTINUED | OUTPATIENT
Start: 2024-03-18 | End: 2024-03-26

## 2024-03-18 RX ORDER — HEPARIN SODIUM 5000 [USP'U]/ML
5000 INJECTION INTRAVENOUS; SUBCUTANEOUS ONCE
Refills: 0 | Status: COMPLETED | OUTPATIENT
Start: 2024-03-18 | End: 2024-03-18

## 2024-03-18 RX ORDER — VANCOMYCIN HCL 1 G
1000 VIAL (EA) INTRAVENOUS EVERY 24 HOURS
Refills: 0 | Status: DISCONTINUED | OUTPATIENT
Start: 2024-03-18 | End: 2024-03-19

## 2024-03-18 RX ORDER — PIPERACILLIN AND TAZOBACTAM 4; .5 G/20ML; G/20ML
4.5 INJECTION, POWDER, LYOPHILIZED, FOR SOLUTION INTRAVENOUS ONCE
Refills: 0 | Status: COMPLETED | OUTPATIENT
Start: 2024-03-18 | End: 2024-03-18

## 2024-03-18 RX ORDER — PANTOPRAZOLE SODIUM 20 MG/1
40 TABLET, DELAYED RELEASE ORAL
Refills: 0 | Status: DISCONTINUED | OUTPATIENT
Start: 2024-03-18 | End: 2024-03-26

## 2024-03-18 RX ORDER — AZITHROMYCIN 500 MG/1
500 TABLET, FILM COATED ORAL EVERY 24 HOURS
Refills: 0 | Status: DISCONTINUED | OUTPATIENT
Start: 2024-03-19 | End: 2024-03-19

## 2024-03-18 RX ORDER — AZITHROMYCIN 500 MG/1
TABLET, FILM COATED ORAL
Refills: 0 | Status: DISCONTINUED | OUTPATIENT
Start: 2024-03-18 | End: 2024-03-19

## 2024-03-18 RX ORDER — CLONAZEPAM 1 MG
0.12 TABLET ORAL DAILY
Refills: 0 | Status: DISCONTINUED | OUTPATIENT
Start: 2024-03-18 | End: 2024-03-20

## 2024-03-18 RX ORDER — ATORVASTATIN CALCIUM 80 MG/1
10 TABLET, FILM COATED ORAL AT BEDTIME
Refills: 0 | Status: DISCONTINUED | OUTPATIENT
Start: 2024-03-18 | End: 2024-03-20

## 2024-03-18 RX ORDER — PIPERACILLIN AND TAZOBACTAM 4; .5 G/20ML; G/20ML
4.5 INJECTION, POWDER, LYOPHILIZED, FOR SOLUTION INTRAVENOUS EVERY 8 HOURS
Refills: 0 | Status: DISCONTINUED | OUTPATIENT
Start: 2024-03-18 | End: 2024-03-20

## 2024-03-18 RX ORDER — SODIUM CHLORIDE 9 MG/ML
4 INJECTION INTRAMUSCULAR; INTRAVENOUS; SUBCUTANEOUS EVERY 6 HOURS
Refills: 0 | Status: DISCONTINUED | OUTPATIENT
Start: 2024-03-18 | End: 2024-03-20

## 2024-03-18 RX ORDER — METRONIDAZOLE 500 MG
TABLET ORAL
Refills: 0 | Status: DISCONTINUED | OUTPATIENT
Start: 2024-03-18 | End: 2024-03-18

## 2024-03-18 RX ORDER — ACETAMINOPHEN 500 MG
650 TABLET ORAL EVERY 6 HOURS
Refills: 0 | Status: DISCONTINUED | OUTPATIENT
Start: 2024-03-18 | End: 2024-03-20

## 2024-03-18 RX ORDER — DOXAZOSIN MESYLATE 4 MG
4 TABLET ORAL AT BEDTIME
Refills: 0 | Status: DISCONTINUED | OUTPATIENT
Start: 2024-03-18 | End: 2024-03-18

## 2024-03-18 RX ORDER — DOXAZOSIN MESYLATE 4 MG
4 TABLET ORAL AT BEDTIME
Refills: 0 | Status: DISCONTINUED | OUTPATIENT
Start: 2024-03-18 | End: 2024-03-26

## 2024-03-18 RX ORDER — SODIUM CHLORIDE 9 MG/ML
500 INJECTION, SOLUTION INTRAVENOUS
Refills: 0 | Status: DISCONTINUED | OUTPATIENT
Start: 2024-03-18 | End: 2024-03-18

## 2024-03-18 RX ORDER — TERAZOSIN HYDROCHLORIDE 10 MG/1
5 CAPSULE ORAL
Refills: 0 | DISCHARGE

## 2024-03-18 RX ORDER — FINASTERIDE 5 MG/1
5 TABLET, FILM COATED ORAL DAILY
Refills: 0 | Status: DISCONTINUED | OUTPATIENT
Start: 2024-03-18 | End: 2024-03-26

## 2024-03-18 RX ORDER — AZITHROMYCIN 500 MG/1
500 TABLET, FILM COATED ORAL ONCE
Refills: 0 | Status: COMPLETED | OUTPATIENT
Start: 2024-03-18 | End: 2024-03-18

## 2024-03-18 RX ORDER — ACETAMINOPHEN 500 MG
975 TABLET ORAL ONCE
Refills: 0 | Status: COMPLETED | OUTPATIENT
Start: 2024-03-18 | End: 2024-03-18

## 2024-03-18 RX ADMIN — PIPERACILLIN AND TAZOBACTAM 25 GRAM(S): 4; .5 INJECTION, POWDER, LYOPHILIZED, FOR SOLUTION INTRAVENOUS at 14:21

## 2024-03-18 RX ADMIN — Medication 250 MILLIGRAM(S): at 06:49

## 2024-03-18 RX ADMIN — Medication 650 MILLIGRAM(S): at 22:57

## 2024-03-18 RX ADMIN — ATORVASTATIN CALCIUM 10 MILLIGRAM(S): 80 TABLET, FILM COATED ORAL at 22:57

## 2024-03-18 RX ADMIN — PIPERACILLIN AND TAZOBACTAM 200 GRAM(S): 4; .5 INJECTION, POWDER, LYOPHILIZED, FOR SOLUTION INTRAVENOUS at 03:52

## 2024-03-18 RX ADMIN — SODIUM CHLORIDE 4 MILLILITER(S): 9 INJECTION INTRAMUSCULAR; INTRAVENOUS; SUBCUTANEOUS at 09:44

## 2024-03-18 RX ADMIN — AZITHROMYCIN 255 MILLIGRAM(S): 500 TABLET, FILM COATED ORAL at 03:52

## 2024-03-18 RX ADMIN — FINASTERIDE 5 MILLIGRAM(S): 5 TABLET, FILM COATED ORAL at 11:09

## 2024-03-18 RX ADMIN — Medication 650 MILLIGRAM(S): at 23:55

## 2024-03-18 RX ADMIN — SODIUM CHLORIDE 250 MILLILITER(S): 9 INJECTION, SOLUTION INTRAVENOUS at 03:38

## 2024-03-18 RX ADMIN — PIPERACILLIN AND TAZOBACTAM 25 GRAM(S): 4; .5 INJECTION, POWDER, LYOPHILIZED, FOR SOLUTION INTRAVENOUS at 06:45

## 2024-03-18 RX ADMIN — Medication 975 MILLIGRAM(S): at 00:24

## 2024-03-18 RX ADMIN — PIPERACILLIN AND TAZOBACTAM 25 GRAM(S): 4; .5 INJECTION, POWDER, LYOPHILIZED, FOR SOLUTION INTRAVENOUS at 22:57

## 2024-03-18 RX ADMIN — PANTOPRAZOLE SODIUM 40 MILLIGRAM(S): 20 TABLET, DELAYED RELEASE ORAL at 06:46

## 2024-03-18 RX ADMIN — SODIUM CHLORIDE 4 MILLILITER(S): 9 INJECTION INTRAMUSCULAR; INTRAVENOUS; SUBCUTANEOUS at 22:58

## 2024-03-18 RX ADMIN — SODIUM CHLORIDE 4 MILLILITER(S): 9 INJECTION INTRAMUSCULAR; INTRAVENOUS; SUBCUTANEOUS at 04:58

## 2024-03-18 RX ADMIN — Medication 3 MILLILITER(S): at 22:58

## 2024-03-18 RX ADMIN — Medication 3 MILLILITER(S): at 04:58

## 2024-03-18 RX ADMIN — Medication 3 MILLILITER(S): at 09:43

## 2024-03-18 NOTE — CONSULT NOTE ADULT - PROBLEM SELECTOR RECOMMENDATION 4
S: Update: No adverse events over the last 24 hours. Reports that he has been having difficulty using the face shield mask, otherwise reports feeling back to his baseline. No new complaints.    Visit Vitals  /88   Pulse (!) 46   Temp 97.5 °F (36.4 °C) (Oral)   Resp 15   Ht 6' 1\" (1.854 m)   Wt 89 kg   SpO2 100%   BMI 25.89 kg/m²       Exam:    Gen: Trace B/L lower ext edema  Chest: B/L clear, NVB, no crepts, wheezing.  CVS: S1+S2+ no murmurs.  Abd: Nephrostomy and suprapubic catheters in situ, soft, non tender  MSK: contractures of both upper and lower extremities, I was unable to examine the back, however per the nursing staff he has multiple stage 4 decub ulcers.  Neuro: AO X 4      A/P: 32 yo with PMH of Cervical spine injury (2002), quadriplegia, neurogenic bladder S/P B/L nephrostomy and suprapubic catheters, ?sleep apnea. Pt was admitted with septic shock, we have been consulted for management of witness apneic spells.    #Sleep Apnea    Patient reports that he had sleep study in [place] many years ago.  He used PAP therapy for a while in the past, but reports that he has not used it for the last few years (? Intolerance)  He has risk factors for both central and obstructive sleep apneas.  Ordered BiPAP (14/8).  Pt dislikes face shield mask, advise trying full fask mask or nasal pillows.  He may continue using the same for now even at his NH.  In future if the patient goes home, he may need to be formally qualified for a new machine and or supplies.  In that case he will need to fup with us in our clinic for a formal sleep study.      D/W Dr. Lornez    Thanks for letting us take care of this interesting case.    Cinthia Flores MSc MD  Fellow-Pulmonary and Critical Care   Department of Medicine  Pager: [phone]    I personally saw the pt, history, physical exam findings, data, assessment and plan all formulated and confirmed by me as outlined in the fellow's note.      On my examination   Alert and  conversant on RA  Lungs are CTA    Yessica Lorenz MD           .  Complex medical decision making / symptom management in the setting of stage IV lung cancer, with increasing symptomatology.    Patient remains full code at the moment.  Surrogate is Yarely Xiong, patients' sister. He has never appointed her as HCP, but states that she is the emergency contact. Difficult to engage GOC until further plan is identified. Patient is hospice eligible, but presents from rehab. Lives alone prior to rehab, which will make hospice at home an impossibility. [place] GOC pending.     Will continue to follow for ongoing GOC discussion / titration of palliative regimen. Emotional support provided, questions answered.  Active Psychosocial Referrals:  [x]Social Work/Case management []PT/OT []Chaplaincy []Hospice  []Patient/Family Support []Holistic RN []Massage Therapy []Music Therapy []Ethics  Coping: [] well [] with difficulty [] poor coping [] unable to assess  Support system: [] strong [] adequate [] inadequate    For new or uncontrolled symptoms, please call Palliative Care at [phone] ([phone]). The service is available 24/7 (including nights & weekends) to provide symptom management recommendations over the phone as appropriate

## 2024-03-18 NOTE — ED PROVIDER NOTE - PHYSICAL EXAMINATION
Constitutional : frail, cachetic, chronically ill appearing. awake, alert, oriented to person, place, time/situation.  Head : head normocephalic, atraumatic  EENMT : eyes clear bilaterally, PERRL, EOMI. airway patent. moist mucous membranes. neck supple.  Cardiac : Normal rate, regular rhythm. No murmur appreciated, no LE edema.  Resp : Dec breath sounds R base. Slightly tachypenic w conversation - ok at rest.   Gastro : abdomen soft, nontender, nondistended. no rebound or guarding.   MSK :  range of motion is not limited, no muscle or joint tenderness  Back : No evidence of trauma. No spinal tenderness  Vasc : Extremities warm and well perfused. 2+ radial and DP pulses. cap refill <2 seconds  Neuro : Alert and oriented, CNII-XII grossly intact, no motor or sensory deficits.  Skin : Skin normal color for race, warm, dry and intact. No evidence of rash.  Psych : Alert and oriented to person, place, time/situation. normal mood and affect. no apparent risk to self or others. Constitutional : frail, cachetic, chronically ill appearing. awake, alert, oriented to person, place, time/situation.  Head : head normocephalic, atraumatic  EENMT : eyes clear bilaterally, PERRL, EOMI. airway patent. moist mucous membranes. neck supple.  Cardiac : Normal rate, regular rhythm. No murmur appreciated, no LE edema.  R chest wall pleurx site - clean / dry, dressing intact on arrival. no surrounding redness or tenderness.   Resp : Dec breath sounds R base. Slightly tachypenic w conversation - ok at rest.   Gastro : abdomen soft, nontender, nondistended. no rebound or guarding.   MSK :  range of motion is not limited, no muscle or joint tenderness  Back : No evidence of trauma. No spinal tenderness  Vasc : Extremities warm and well perfused. 2+ radial and DP pulses. cap refill <2 seconds  Neuro : Alert and oriented, CNII-XII grossly intact, no motor or sensory deficits.  Skin : Skin normal color for race, warm, dry and intact. No evidence of rash.  Psych : Alert and oriented to person, place, time/situation. normal mood and affect. no apparent risk to self or others.

## 2024-03-18 NOTE — H&P ADULT - HISTORY OF PRESENT ILLNESS
88M PMH squamous cell lung cancer with pleural effusions s/p Pleurx placement prior to this hospitalization, BPH, peripheral neuropathy, and anxiety presenting with progressive dyspnea i/s/o known MPE and IPC. Was recently admitted to [facility] for further evaluation and management of Failure to Thrive. Course was c/b urinary retention needed ruth placement on 2/22. The patient is being followed by pulmonology for the Pleurx drainage, fluid studies were sent and there was concern for infection for which patient was started on Vanc and Zosyn. S/p TPA/Dornase MIST 2 protocol. Was supposed to f/u with pulm, o/p (refused) now presenting with worsening R pleural effusion. Per ED provider patient refused follow up and [facility] was able to reach pulm team who recommended increased pleurx drainage to EOD. Despite this change patient has reaccumulated pleural effusion on R (worse than at time of previous discharge). Pulm may require additional pleural fluid studies/thoracentesis in AM. Patient requiring additional O2 supplementation at time of admission.     ED Course:   Vitals T: 100.2F, HR: 98, BP: 109/68, RR: 16, SpO2: 100% on RA      Labs significant for WBC 33.87, hgb 8.7, plt 474, PT/INR/PTT all elevated, Na 134, K 4.1, Cr 0.89 (baseline 0.9), lactate 2,     CXR with worsened R pleural effusion    Of note: most recent CT CAP 02/2024with previously noted approximate 3.5 cm right lower lobe mass now appears as a nonenhancing geographic area of low attenuation/fluid attenuation. A punctate calcification is seen within the lesion, which can be seen on the pretreatment scan. Adjacent focal atelectasis noted surrounding the lesion, likely posttreatment change. New moderate bilateral pleural effusions and right hydropneumothorax with a chest tube tip positioned superiorly and medially, with the tip not in the pneumothorax itself. Multifocal indeterminate right pleural-based nodularity noted. Subcarinal and right hilar lymphadenopathy, however, the lymph nodes are of low attenuation and possible posttreatment change.     EKG: sinus tachycardia with PVCx L axis deviation and RBBB    Interventions: Tylenol 975mg PO x1 in ED    Home meds: acetaminophen 325 mg oral tablet: 2 tab(s) PO q6h PRN, amoxicillin-clavulanate 500 mg-125 mg 500mg PO q8h, finasteride 5 mg PO qd, heparin: 5,000 unit(s) SQ q8h for DVT ppx, KlonoPIN 0.125 mg PO qd for anxiety, Lipitor 10 mg PO qHS, melatonin 3 mg PO qHS PRN for sleep, pantoprazole 40 mg PO qd, terazosin 5 mg PO qHS

## 2024-03-18 NOTE — PROGRESS NOTE ADULT - PROBLEM SELECTOR PLAN 3
seen by pulm on previous admissin for MPE s/p IPC placement by Dr. Lawrence. bedside US on previous admission in 02/2024, right sided small pleural effusion with visible septations. Per Dr. Lawrence who placed pleurx, effusion was septated when pleurx was initially placed. cell count shows 95% neutrophils. Glucose 14 and LDH >2500, concerning for complicated parapneumonic effusion. Pt now s/p MIST2 protocol. CTAP at that admission showed subcarinal and right hilar LAD and b/l pleural effusion with right sided hydropneumothorax. Pleural fluid cyto POSITIVE FOR MALIGNANT CELLS. Keratinizing  squamous cell carcinoma, predominantly dispersed cells, in background of purulent inflammation. Patient was supposed to have follwed with pulm as outpatient however refused to attend f/u appt. Subsequently required EOD drainage of pleurx while at [facility]. Pulm consulted in ED.   - f/u with pulm in AM seen by pulm on previous admissin for MPE s/p IPC placement by Dr. Lawrence. bedside US on previous admission in 02/2024, right sided small pleural effusion with visible septations. Per Dr. Lawrence who placed pleurx, effusion was septated when pleurx was initially placed. cell count shows 95% neutrophils. Glucose 14 and LDH >2500, concerning for complicated parapneumonic effusion. Pt now s/p MIST2 protocol. CTAP at that admission showed subcarinal and right hilar LAD and b/l pleural effusion with right sided hydropneumothorax. Pleural fluid cyto POSITIVE FOR MALIGNANT CELLS. Keratinizing  squamous cell carcinoma, predominantly dispersed cells, in background of purulent inflammation. Patient was supposed to have follwed with pulm as outpatient however refused to attend f/u appt. Subsequently required EOD drainage of pleurx while at [facility]. Pulm consulted in ED.   - f/u pulm recs  - f/u pleural fluid studies

## 2024-03-18 NOTE — ED PROVIDER NOTE - OBJECTIVE STATEMENT
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 Pt admitted to [facility] from 2/20-2/29/2024 for failure to thrive. followed by pulm and during admission concern for infection based on fluid studies and completed course of vanc / zosyn. was continued on / discharged on augmentin. pt discharged to [facility] facility. pt was scheduled for pulm f/u appt on 3/3 but pt refused to go to appointment. pulm recs to team at NH was to increased drainage of pleurx to every other day. per NH doctor in last 48 hours pt w increased work of breathing, increased O2 requirement (4L from 2L), hypoxia to high 80s, and tachycardia. pt complains of worsening SOB. "I feel terrible." no fevers documented at NH. has been compliant w augmentin.   pt denies cp, abd pain, n/v/d, leg swelling, urinary symptoms, dizziness.

## 2024-03-18 NOTE — CONSULT NOTE ADULT - PROBLEM SELECTOR RECOMMENDATION 9
- Patient has not received disease targeted therapies due to poor functional status.  - Stage IV disease present.  - Prognosis very guarded.  - In the past, patient has not been amenable to engage with goals of care discussions.   - Patient is hospice eligible, but not engaging in discussions.

## 2024-03-18 NOTE — H&P ADULT - PROBLEM SELECTOR PLAN 6
likely reactive in nature, with increase also likely d/t iron deficiency, and likely intravascular volume depletion.   - c/t monitor

## 2024-03-18 NOTE — CONSULT NOTE ADULT - ATTENDING COMMENTS
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 Now re-admitted with low grade fever, persistent, loculated right pleural effusion and “wet” cough. WBC persistently elevated. RVP + for hMPV. Pleural fluid studies: Glu<2, LDH > 2500, 97% PMN. These numbers are very similar to what they were on 2/24 when he was thought to have an infected pleural space (no cx available but cytology with “purulent inflammation” in addition to malignant cells).     Problems:     #1 MPE with probable infected pleural space / empyema   --will ask thoracic surgery to evaluate for intervention    # 2 hMPV respiratory tract infection   --supportive care

## 2024-03-18 NOTE — PROGRESS NOTE ADULT - SUBJECTIVE AND OBJECTIVE BOX
O/N Events:    Subjective/ROS: Patient seen and examined at bedside.     Denies Fever/Chills, HA, CP, SOB, n/v, changes in bowel/urinary habits.  12pt ROS otherwise negative.    VITALS  Vital Signs Last 24 Hrs  T(C): 36.3 (18 Mar 2024 06:33), Max: 37.9 (17 Mar 2024 23:58)  T(F): 97.4 (18 Mar 2024 06:33), Max: 100.2 (17 Mar 2024 23:58)  HR: 109 (18 Mar 2024 06:33) (98 - 109)  BP: 100/61 (18 Mar 2024 06:33) (95/58 - 109/68)  BP(mean): --  RR: 18 (18 Mar 2024 07:28) (16 - 18)  SpO2: 92% (18 Mar 2024 07:28) (90% - 100%)    Parameters below as of 18 Mar 2024 07:28  Patient On (Oxygen Delivery Method): nasal cannula  O2 Flow (L/min): 2      CAPILLARY BLOOD GLUCOSE          PHYSICAL EXAM  General: NAD  Head: NC/AT; MMM; PERRL; EOMI;  Neck: Supple; no JVD  Respiratory: CTAB; no wheezes/rales/rhonchi  Cardiovascular: Regular rhythm/rate; S1/S2+, no murmurs, rubs gallops   Gastrointestinal: Soft; NTND; bowel sounds normal and present  Extremities: WWP; no edema/cyanosis  Neurological: A&Ox3, CNII-XII grossly intact; no obvious focal deficits    MEDICATIONS  (STANDING):  albuterol/ipratropium for Nebulization 3 milliLiter(s) Nebulizer every 6 hours  atorvastatin 10 milliGRAM(s) Oral at bedtime  azithromycin  IVPB      doxazosin 4 milliGRAM(s) Oral at bedtime  finasteride 5 milliGRAM(s) Oral daily  pantoprazole    Tablet 40 milliGRAM(s) Oral before breakfast  piperacillin/tazobactam IVPB.. 4.5 Gram(s) IV Intermittent every 8 hours  sodium chloride 3%  Inhalation 4 milliLiter(s) Inhalation every 6 hours  vancomycin  IVPB 1000 milliGRAM(s) IV Intermittent every 24 hours    MEDICATIONS  (PRN):  acetaminophen     Tablet .. 650 milliGRAM(s) Oral every 6 hours PRN Temp greater or equal to 38C (100.4F), Mild Pain (1 - 3)  clonazePAM Oral Disintegrating Tablet 0.125 milliGRAM(s) Oral daily PRN anxiety  melatonin 3 milliGRAM(s) Oral at bedtime PRN Sleep      No Known Allergies      LABS                        8.7    33.87 )-----------( 474      ( 17 Mar 2024 21:53 )             28.6     03-18    x   |  x   |  17  ----------------------------<  117<H>  x    |  27  |  0.83    Ca    9.2      18 Mar 2024 07:16  Phos  3.0     03-18  Mg     2.0     03-18      PT/INR - ( 17 Mar 2024 21:53 )   PT: 14.4 sec;   INR: 1.27          PTT - ( 17 Mar 2024 21:53 )  PTT:39.4 sec  Urinalysis Basic - ( 18 Mar 2024 07:16 )    Color: x / Appearance: x / SG: x / pH: x  Gluc: 117 mg/dL / Ketone: x  / Bili: x / Urobili: x   Blood: x / Protein: x / Nitrite: x   Leuk Esterase: x / RBC: x / WBC x   Sq Epi: x / Non Sq Epi: x / Bacteria: x              IMAGING/EKG/ETC   O/N Events: Patient placed on 2L nasal cannula due to decreasing sat to low 90s.      Subjective/ROS: Patient was seen and examined at bedside.   Patient denies fever, chills, and headaches.   The patient denies chest pain, endorses shortness of breath, and consistent cough.  No nausea/vomiting, patient had a "small accidental" bowel movement yesterday.  Patient endorses passing urine via his Gerber catheter.      VITALS  Vital Signs Last 24 Hrs  T(C): 36.3 (18 Mar 2024 06:33), Max: 37.9 (17 Mar 2024 23:58)  T(F): 97.4 (18 Mar 2024 06:33), Max: 100.2 (17 Mar 2024 23:58)  HR: 109 (18 Mar 2024 06:33) (98 - 109)  BP: 100/61 (18 Mar 2024 06:33) (95/58 - 109/68)  BP(mean): --  RR: 18 (18 Mar 2024 07:28) (16 - 18)  SpO2: 92% (18 Mar 2024 07:28) (90% - 100%)    Parameters below as of 18 Mar 2024 07:28  Patient On (Oxygen Delivery Method): nasal cannula  O2 Flow (L/min): 2      CAPILLARY BLOOD GLUCOSE          PHYSICAL EXAM  General: NAD  Head: normocephalic, atraumatic, moist mucus membranes, extra-ocular movements intact  Neck: Supple; no JVD  Respiratory: Right apex clear, decreased breath sounds on right base. Left apex expiratory rhonchi, left base less audible expiratory rhonchi   Cardiovascular: S1/S2+ appreciated  Gastrointestinal: Soft; non-tender, non-distended; bowel sounds normal and present  Extremities: warm-well perfused; no edema/cyanosis; dorsalis pedis and radialis artery palpated   Neurological: A&Ox3,    MEDICATIONS  (STANDING):  albuterol/ipratropium for Nebulization 3 milliLiter(s) Nebulizer every 6 hours  atorvastatin 10 milliGRAM(s) Oral at bedtime  azithromycin  IVPB      doxazosin 4 milliGRAM(s) Oral at bedtime  finasteride 5 milliGRAM(s) Oral daily  pantoprazole    Tablet 40 milliGRAM(s) Oral before breakfast  piperacillin/tazobactam IVPB.. 4.5 Gram(s) IV Intermittent every 8 hours  sodium chloride 3%  Inhalation 4 milliLiter(s) Inhalation every 6 hours  vancomycin  IVPB 1000 milliGRAM(s) IV Intermittent every 24 hours    MEDICATIONS  (PRN):  acetaminophen     Tablet .. 650 milliGRAM(s) Oral every 6 hours PRN Temp greater or equal to 38C (100.4F), Mild Pain (1 - 3)  clonazePAM Oral Disintegrating Tablet 0.125 milliGRAM(s) Oral daily PRN anxiety  melatonin 3 milliGRAM(s) Oral at bedtime PRN Sleep      No Known Allergies      LABS                        8.2    34.44 )-----------( 458      ( 18 Mar 2024 07:16 )             26.7     03-18    133<L>  |  97  |  17  ----------------------------<  117<H>  3.9   |  27  |  0.83    Ca    9.2      18 Mar 2024 07:16  Phos  3.0     03-18  Mg     2.0     03-18    TPro  4.9<L>  /  Alb  2.0<L>  /  TBili  0.4  /  DBili  x   /  AST  10  /  ALT  10  /  AlkPhos  161<H>  03-18    PT/INR - ( 18 Mar 2024 07:16 )   PT: 13.6 sec;   INR: 1.20          PTT - ( 18 Mar 2024 07:16 )  PTT:32.5 sec  Urinalysis Basic - ( 18 Mar 2024 07:16 )    Color: x / Appearance: x / SG: x / pH: x  Gluc: 117 mg/dL / Ketone: x  / Bili: x / Urobili: x   Blood: x / Protein: x / Nitrite: x   Leuk Esterase: x / RBC: x / WBC x   Sq Epi: x / Non Sq Epi: x / Bacteria: x              IMAGING/EKG/ETC  ACC: [id number] EXAM: XR CHEST PORTABLE URGENT 1V ORDERED BY: LISE MEJÍA    PROCEDURE DATE: 03/17/2024        INTERPRETATION: Clinical history/reason for exam: Post thoracentesis.    Frontal chest.    COMPARISON: February 20, 2024.    Findings/  impression: Stable positioning right chest tube No pneumothorax. Right opacity, increased. Right pleural effusion, decreased. Left basilar opacity, new. Heart size within normal limits, thoracic aortic calcification. Unremarkable mediastinum. Stable bony structures.    --- End of Report ---            SHARDA ALSTON MD; Attending Radiologist  This document has been electronically signed. Mar 18 2024 5:04AM     O/N Events: Patient placed on 2L nasal cannula due to decreasing sat to low 90s.      Subjective/ROS: Patient was seen and examined at bedside. Patient denies fever, chills, and headache, CP, SOB. Reports persistent cough, had a "small accidental" bowel movement yesterday.    VITALS  Vital Signs Last 24 Hrs  T(C): 36.3 (18 Mar 2024 06:33), Max: 37.9 (17 Mar 2024 23:58)  T(F): 97.4 (18 Mar 2024 06:33), Max: 100.2 (17 Mar 2024 23:58)  HR: 109 (18 Mar 2024 06:33) (98 - 109)  BP: 100/61 (18 Mar 2024 06:33) (95/58 - 109/68)  BP(mean): --  RR: 18 (18 Mar 2024 07:28) (16 - 18)  SpO2: 92% (18 Mar 2024 07:28) (90% - 100%)    Parameters below as of 18 Mar 2024 07:28  Patient On (Oxygen Delivery Method): nasal cannula  O2 Flow (L/min): 2      CAPILLARY BLOOD GLUCOSE          PHYSICAL EXAM  General: NAD  Head: normocephalic, atraumatic, moist mucus membranes, extra-ocular movements intact  Neck: Supple; no JVD  Respiratory: Right apex clear, decreased breath sounds on right base. Left apex expiratory rhonchi, left base less audible expiratory rhonchi   Cardiovascular: S1/S2+ appreciated  Gastrointestinal: Soft; non-tender, non-distended; bowel sounds normal and present  Extremities: warm-well perfused; no edema/cyanosis; dorsalis pedis and radialis artery palpated   Neurological: A&Ox3,    MEDICATIONS  (STANDING):  albuterol/ipratropium for Nebulization 3 milliLiter(s) Nebulizer every 6 hours  atorvastatin 10 milliGRAM(s) Oral at bedtime  azithromycin  IVPB      doxazosin 4 milliGRAM(s) Oral at bedtime  finasteride 5 milliGRAM(s) Oral daily  pantoprazole    Tablet 40 milliGRAM(s) Oral before breakfast  piperacillin/tazobactam IVPB.. 4.5 Gram(s) IV Intermittent every 8 hours  sodium chloride 3%  Inhalation 4 milliLiter(s) Inhalation every 6 hours  vancomycin  IVPB 1000 milliGRAM(s) IV Intermittent every 24 hours    MEDICATIONS  (PRN):  acetaminophen     Tablet .. 650 milliGRAM(s) Oral every 6 hours PRN Temp greater or equal to 38C (100.4F), Mild Pain (1 - 3)  clonazePAM Oral Disintegrating Tablet 0.125 milliGRAM(s) Oral daily PRN anxiety  melatonin 3 milliGRAM(s) Oral at bedtime PRN Sleep      No Known Allergies      LABS                        8.2    34.44 )-----------( 458      ( 18 Mar 2024 07:16 )             26.7     03-18    133<L>  |  97  |  17  ----------------------------<  117<H>  3.9   |  27  |  0.83    Ca    9.2      18 Mar 2024 07:16  Phos  3.0     03-18  Mg     2.0     03-18    TPro  4.9<L>  /  Alb  2.0<L>  /  TBili  0.4  /  DBili  x   /  AST  10  /  ALT  10  /  AlkPhos  161<H>  03-18    PT/INR - ( 18 Mar 2024 07:16 )   PT: 13.6 sec;   INR: 1.20          PTT - ( 18 Mar 2024 07:16 )  PTT:32.5 sec  Urinalysis Basic - ( 18 Mar 2024 07:16 )    Color: x / Appearance: x / SG: x / pH: x  Gluc: 117 mg/dL / Ketone: x  / Bili: x / Urobili: x   Blood: x / Protein: x / Nitrite: x   Leuk Esterase: x / RBC: x / WBC x   Sq Epi: x / Non Sq Epi: x / Bacteria: x              IMAGING/EKG/ETC  ACC: [id number] EXAM: XR CHEST PORTABLE URGENT 1V ORDERED BY: LISE MEJÍA    PROCEDURE DATE: 03/17/2024        INTERPRETATION: Clinical history/reason for exam: Post thoracentesis.    Frontal chest.    COMPARISON: February 20, 2024.    Findings/  impression: Stable positioning right chest tube No pneumothorax. Right opacity, increased. Right pleural effusion, decreased. Left basilar opacity, new. Heart size within normal limits, thoracic aortic calcification. Unremarkable mediastinum. Stable bony structures.    --- End of Report ---            SHARDA ALSTON MD; Attending Radiologist  This document has been electronically signed. Mar 18 2024 5:04AM

## 2024-03-18 NOTE — H&P ADULT - NSHPSOCIALHISTORY_GEN_ALL_CORE
prior smoker, quit in 1976 after smoking for 20 years. He reports he has a sister named Marion in [place], but no friends or family in the area. Does not have official HCP, however he states it would probably be his sister. Per previous [facility] notes patient chooses to live one day at a time.

## 2024-03-18 NOTE — H&P ADULT - ATTENDING COMMENTS
89 yo M with PMHx lung SCC, BPH, peripheral neuropathy, anxiety px from [facility] with elevated wbc   #sepsis 2.2   Complicated effusion r/o empyema   #Squamous cell carcinoma of the lung   fu MRSA   started on zithro, vanc , zosyn   fu blood and sputum cultures - mrsa swap fu   fu pulm recs   palliative, nutrition consult   PT Consulted   #urinary retention 2/2 bph   cw ruth     dvt ppx sqh on hold for possible procedures

## 2024-03-18 NOTE — ED PROVIDER NOTE - CLINICAL SUMMARY MEDICAL DECISION MAKING FREE TEXT BOX
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 since dc (missed pulm appt), regular drainage of pleurx by NH. now 48 hours w increased work of breathing, increased O2 requirement (4L from 2L), hypoxia to high 80s, and tachycardia. pt complains of worsening SOB.   pt appears chronically ill but nontoxic, borderline tachy to 98bpm, sat high 90s on 4L NC, not tachypenic @ rest. exam w dec breath sounds R base. no LE edema.  suspect symptoms from known pleural effusion. will rpt cxr.   labs w cultures, lactate sent as inc wbc count at facility.   doubt PE.   will discuss w pulm.

## 2024-03-18 NOTE — CONSULT NOTE ADULT - ASSESSMENT
{\rtf1\xnqvuv38186\ansi\qzlreyu4039\ftnbj\uc1\deff0  {\fonttbl{\f0 \fnil Segoe UI;}{\f1 \fnil \fcharset0 Segoe UI;}{\f2 \fnil Times New Luis;}}  {\colortbl ;\syf504\tymup574\bpdn359 ;\red0\green0\blue0 ;\red0\green0\skqy589 ;\red0\green0\blue0 ;}  {\stylesheet{\f0\fs20 Normal;}{\cs1 Default Paragraph Font;}{\cs2\f0\fs16 Line Number;}{\cs3\f2\fs24\ul\cf3 Hyperlink;}}  {\*\revtbl{Unknown;}}  \cqczdp12627\xogpgd33811\ihreb0835\srfno0724\wxghb3017\kahwr5869\alwmecl810\etkblkw836\nogrowautofit\bmhpju034\formshade\nofeaturethrottle1\dntblnsbdb\fet4\aendnotes\aftnnrlc\pgbrdrhead\pgbrdrfoot  \sectd\ivjkdx84787\hucsgu95050\guttersxn0\tiqsypgk0435\gzhnziay8089\mngcztma1265\hjgbkhey4176\ukeistd462\vxyjpjf671\sbkpage\pgncont\pgndec  \plain\plain\f0\fs24\ql\plain\f0\fs24\plain\f0\fs20\jaih8081\hich\f0\dbch\f0\loch\f0\fs20\par  I M\par  \par  88 y o M PMH squamous cell lung cancer, BPH, peripheral neuropathy, and anxiety presenting with progressive dyspnea i/s/o known MPE and IPC. Was recently seen in 02/2024 and was supposed to f/u with pulm, o/p (refused) not presenting with worsening R   pleural effusion. \par  \par  \plain\f1\fs20\gsrc0109\hich\f1\dbch\f1\loch\f1\cf2\fs20\ul{\field{\*\fldinst HYPERLINK [id number] }{\fldrslt Problem/Plan - 1:}}\plain\f0\fs20\rjib9216\hich\f0\dbch\f0\loch\f0\fs20\ql\par  \'b7  {\*\bkmkstart vw71239780831}{\*\bkmkend dh71742914577}Problem: {\*\bkmkstart yx98525244514}{\*\bkmkend bx59297378270}Pneumonia. \par  \'b7  {\*\bkmkstart bs08417251833}{\*\bkmkend ol31252841810}Plan: {\*\bkmkstart am57999461872}{\*\bkmkend uu82936568538}likely 2/2 PNA: Patient meeting SIRS criteria (HR >90 and Leukocytosis). Leukocytosis worsening from previous admission. Likely iso   parapneumonic effusion. Note that per pulm patient was to be discharged on levoquin however medication was too expensive and augmentin was chosen as a treatment. s/p MIST II protocol and Vancomycin (d/c'd 2/25). Patient with worsened productive cough   on admission however unable to mobilize secretions. \par  - start zosyn (pseudomonal coverage) + vancomycin \par  - f/u MRSA swab, U legionella and U strep \par  - f/u BCx, UCx, SCx, UA, RVP  \par  - 500cc @ 125cc/h stop after 2 hours \par  - Trend WBC\par  - pulm toilet q6h\par  - azithro 500mg IVPB q24h.\plain\f1\fs20\sjyc8636\hich\f1\dbch\f1\loch\f1\cf2\fs20\strike\plain\f0\fs20\jxbg2883\hich\f0\dbch\f0\loch\f0\fs20\par  \par  \plain\f1\fs20\wdgw7082\hich\f1\dbch\f1\loch\f1\cf2\fs20\ul{\field{\*\fldinst HYPERLINK [id number] }{\fldrslt Problem/Plan - 2:}}\plain\f0\fs20\unxx9412\hich\f0\dbch\f0\loch\f0\fs20\ql\par  \'b7  {\*\bkmkstart cv70576972124}{\*\bkmkend ob52991885912}Problem: {\*\bkmkstart za87266068640}{\*\bkmkend bz63805489076}Sepsis. \par  \'b7  {\*\bkmkstart yk47633515457}{\*\bkmkend ei28536613335}Plan: {\*\bkmkstart df03665859498}{\*\bkmkend rj70435679382}see pneumonia problem above.\par  \par  \plain\f1\fs20\pked3953\hich\f1\dbch\f1\loch\f1\cf2\fs20\ul{\field{\*\fldinst HYPERLINK [id number] }{\fldrslt Problem/Plan - 3:}}\plain\f0\fs20\uxls6833\hich\f0\dbch\f0\loch\f0\fs20\ql\par  \'b7  {\*\bkmkstart kh44433992812}{\*\bkmkend ej37254504202}Problem: {\*\bkmkstart ax87420934953}{\*\bkmkend ej49440012939}Squamous cell lung cancer. \par  \'b7  {\*\bkmkstart dk32320851064}{\*\bkmkend dg54406727086}Plan: {\*\bkmkstart ae73513495434}{\*\bkmkend qi96278455653}seen by pulm on previous admissin for MPE s/p IPC placement by Dr. Lawrence. bedside US on previous admission in 02/2024, right sided   small pleural effusion with visible septations. Per Dr. Lawrence who placed pleurx, effusion was septated when pleurx was initially placed. cell count shows 95% neutrophils. Glucose 14 and LDH >2500, concerning for complicated parapneumonic effusion.   Pt now s/p MIST2 protocol. CTAP at that admission showed subcarinal and right hilar LAD and b/l pleural effusion with right sided hydropneumothorax. Pleural fluid cyto POSITIVE FOR MALIGNANT CELLS. Keratinizing  squamous cell carcinoma, predominantly   dispersed cells, in background of purulent inflammation. Patient was supposed to have follwed with pulm as outpatient however refused to attend f/u appt. Subsequently required EOD drainage of pleurx while at [facility]. Pulm consulted in ED. \par  - f/u with pulm in AM.\par  \par  \plain\f1\fs20\ccee5003\hich\f1\dbch\f1\loch\f1\cf2\fs20\ul{\field{\*\fldinst HYPERLINK [id number] }{\fldrslt Problem/Plan - 4:}}\plain\f0\fs20\ztcy4829\hich\f0\dbch\f0\loch\f0\fs20\ql\par  \'b7  {\*\bkmkstart ut46522057155}{\*\bkmkend kh97957695203}Problem: {\*\bkmkstart rt12879320259}{\*\bkmkend gi23244259000}Pleural effusion. \par  \'b7  {\*\bkmkstart fn58655563217}{\*\bkmkend dl54696645696}Plan: {\*\bkmkstart dr54331999691}{\*\bkmkend ps46877416397}s/p dornase. cultures ng. fluids c/w parapnemonic effusion.\par  - pulm following\par  - c/w broad abx for now.\par  \par  \plain\f1\fs20\cntq3628\hich\f1\dbch\f1\loch\f1\cf2\fs20\ul{\field{\*\fldinst HYPERLINK [id number] }{\fldrslt Problem/Plan - 5:}}\plain\f0\fs20\bsmj5589\hich\f0\dbch\f0\loch\f0\fs20\ql\par  \'b7  {\*\bkmkstart sk24705875562}{\*\bkmkend zq82218647763}Problem: {\*\bkmkstart om78102012928}{\*\bkmkend oi55167549127}Normocytic anemia. \par  \'b7  {\*\bkmkstart xj16394180680}{\*\bkmkend di76267192844}Plan: {\*\bkmkstart oc63698514335}{\*\bkmkend se86995932278}Hgb on presentation 9.8, last hgb 11.7 1/2024. Pt denied hematemesis, hemoptysis, or melena. patient now with slightly pink urine draining   from ruth cath. Likely AOCD and nutritional deficiencies i/s/o progressing lung cancer and significant weight loss. Iron studies from previous admission noted. U op from ruth (present at time of admission dark red. \par  - f/u UA (after catheter exchange)\par  - maintain active T&S\par  - f/u urology consult.\plain\f1\fs20\yzfp2309\hich\f1\dbch\f1\loch\f1\cf2\fs20\strike\plain\f0\fs20\yhsz9280\hich\f0\dbch\f0\loch\f0\fs20\par  \par  \plain\f1\fs20\rkxn5870\hich\f1\dbch\f1\loch\f1\cf2\fs20\ul{\field{\*\fldinst HYPERLINK [id number] }{\fldrslt Problem/Plan - 6:}}\plain\f0\fs20\eawc9279\hich\f0\dbch\f0\loch\f0\fs20\ql\par  \'b7  {\*\bkmkstart fq55908239954}{\*\bkmkend cq29339674114}Problem: {\*\bkmkstart mx08131464851}{\*\bkmkend xt25066903320}History of thrombocytosis. \par  \'b7  {\*\bkmkstart ad79982937711}{\*\bkmkend xk36522462321}Plan: {\*\bkmkstart hb49660518315}{\*\bkmkend db10926020464}likely reactive in nature, with increase also likely d/t iron deficiency, and likely intravascular volume depletion. \par  - c/t monitor.\par  \par  \plain\f1\fs20\evwt8636\hich\f1\dbch\f1\loch\f1\cf2\fs20\ul{\field{\*\fldinst HYPERLINK [id number] }{\fldrslt Problem/Plan - 7:}}\plain\f0\fs20\ijmv6662\hich\f0\dbch\f0\loch\f0\fs20\ql\par  \'b7  {\*\bkmkstart mu80551922514}{\*\bkmkend rr82808415028}Problem: {\*\bkmkstart qe87945407968}{\*\bkmkend qw94618795790}FTT (failure to thrive) in adult. \par  \'b7  {\*\bkmkstart sk59278536846}{\*\bkmkend lj41450287623}Plan: {\*\bkmkstart qf78237990655}{\*\bkmkend ft88768271381}I/s/o multiple medical problems including known malignancy, chronic gait instability. Progressive weight loss since lung cancer diagnosis.   Previously lived alone but was unable to cook for self i/s/o fatigue and unstable gait. Was precviously discharged with regular diet, supplements and vitamins. PT evaluated and recommended [facility]. Palliative follow up was scheduled with Dr Rodriguez \par  - f/u dysphagia screen \par  - c/w Regular diet, recommend Ensure Max Protein 2x/day (150 kcal, 30 g protein per serving) \par  - f/u palliative consult in AM.\par  \par  \plain\f1\fs20\tanp8584\hich\f1\dbch\f1\loch\f1\cf2\fs20\ul{\field{\*\fldinst HYPERLINK [id number] }{\fldrslt Problem/Plan - 8:}}\plain\f0\fs20\dzfc0660\hich\f0\dbch\f0\loch\f0\fs20\ql\par  \'b7  {\*\bkmkstart sq52645901324}{\*\bkmkend ct95721101932}Problem: {\*\bkmkstart vu52861949145}{\*\bkmkend gc08455714421}History of urinary retention. \par  \'b7  {\*\bkmkstart hv02393606071}{\*\bkmkend wy93847170671}Plan: {\*\bkmkstart fb00604254841}{\*\bkmkend gi34733328310}Patient with Hx of BPH on medications outpatient. Retaining on previous admission. Trial of straight cath was unsuccessful therefore   urology was called for Ruth placement. Trial of straight cath was unsuccessful therefore urology was called for Ruth placement, and requirment volume rescucitation i/s/o post-obstructive diuresis. Patient with ruth on present on this admission. Will   require exchange prior to U sampling. \par  - c/w terazosin + finasteride.\par  \par  \plain\f1\fs20\uknp6166\hich\f1\dbch\f1\loch\f1\cf2\fs20\ul{\field{\*\fldinst HYPERLINK [id number] }{\fldrslt Problem/Plan - 9:}}\plain\f0\fs20\tjtg6185\hich\f0\dbch\f0\loch\f0\fs20\ql\par  \'b7  {\*\bkmkstart uc52758296927}{\*\bkmkend zs01348138118}Problem: {\*\bkmkstart bh46809243566}{\*\bkmkend hn76237469276}History of BPH. \par  \'b7  {\*\bkmkstart gj21044780071}{\*\bkmkend nu03293125945}Plan: {\*\bkmkstart xu10094848666}{\*\bkmkend mx75636691667}Last seen urologist prior to COVID, per [facility] was seen in 2017. Takes terazosin 5mg and finasteride 5mg at home. Reports chronic nocturia   and polyuria that has not particularly worsened.\par  - c/w home meds.\par  \par  \plain\f1\fs20\ztlf9605\hich\f1\dbch\f1\loch\f1\cf2\fs20\ul{\field{\*\fldinst HYPERLINK [id number] }{\fldrslt Problem/Plan - 10:}}\plain\f0\fs20\qbeh8315\hich\f0\dbch\f0\loch\f0\fs20\ql\par  \'b7  {\*\bkmkstart ep82138712493}{\*\bkmkend ad62391600674}Problem: {\*\bkmkstart za67179760477}{\*\bkmkend os46373892714}Protein calorie malnutrition. \par  \'b7  {\*\bkmkstart up29451936175}{\*\bkmkend xy18777165187}Plan; {\*\bkmkstart qk51392197145}{\*\bkmkend ks90895895826}- BMI 18 >20 pound weight loss over past 2 weeks.\par  - per nutrition consult from last visit recommended regular diet with Ensure Enlive twice per day.\par  \par  \plain\f1\fs20\ijzs3508\hich\f1\dbch\f1\loch\f1\cf2\fs20\ul{\field{\*\fldinst HYPERLINK [id number] }{\fldrslt Problem/Plan - 11:}}\plain\f0\fs20\aibh1113\hich\f0\dbch\f0\loch\f0\fs20\ql\par  \'b7  {\*\bkmkstart gt209408385619}{\*\bkmkend cv582560132706}Problem: {\*\bkmkstart pl165049970090}{\*\bkmkend dm757521341810}Anxiety. \par  \'b7  {\*\bkmkstart cx694997180182}{\*\bkmkend dk578123707267}Plan: {\*\bkmkstart mj148128336663}{\*\bkmkend hi615278908435}on home KlonoPIN 0.125 mg PO qd PRN for anxiety\par  - c/w home med.\par  \par  \plain\f1\fs20\dsqi1808\hich\f1\dbch\f1\loch\f1\cf2\fs20\ul{\field{\*\fldinst HYPERLINK [id number] }{\fldrslt Problem/Plan - 12:}}\plain\f0\fs20\lcfv1454\hich\f0\dbch\f0\loch\f0\fs20\ql\par  \'b7  {\*\bkmkstart rj036430172071}{\*\bkmkend tv154528610806}Problem: {\*\bkmkstart mj833327832658}{\*\bkmkend ic325377548684}Need for prophylactic measure. \par  \'b7  {\*\bkmkstart og769474238203}{\*\bkmkend ip535913174898}Plan: {\*\bkmkstart tv925722729979}{\*\bkmkend qa644310808921}F: Encourage PO\par  E: K>4 Mg>2\par  N: Regular + 2 ensure max protein per day \par  GI: None\par  DVT: plan to give one dose of SQ heparin i/s/o likely procedure \par  Dispo: RMF --> likely to [facility] pending PT eval.\par  \par  }

## 2024-03-18 NOTE — H&P ADULT - NSHPLABSRESULTS_GEN_ALL_CORE
8.7    33.87 )-----------( 474      ( 17 Mar 2024 21:53 )             28.6       03-17    134<L>  |  98  |  18  ----------------------------<  108<H>  4.1   |  28  |  0.89    Ca    9.1      17 Mar 2024 21:53  Mg     2.0     03-17                Urinalysis Basic - ( 17 Mar 2024 21:53 )    Color: x / Appearance: x / SG: x / pH: x  Gluc: 108 mg/dL / Ketone: x  / Bili: x / Urobili: x   Blood: x / Protein: x / Nitrite: x   Leuk Esterase: x / RBC: x / WBC x   Sq Epi: x / Non Sq Epi: x / Bacteria: x        PT/INR - ( 17 Mar 2024 21:53 )   PT: 14.4 sec;   INR: 1.27          PTT - ( 17 Mar 2024 21:53 )  PTT:39.4 sec    Lactate Trend  03-17 @ 21:53 Lactate:2.0             CAPILLARY BLOOD GLUCOSE            Culture Results:   No growth at 5 days. (02-20 @ 17:10)  Culture Results:   No growth at 5 days. (02-20 @ 17:00)

## 2024-03-18 NOTE — PROGRESS NOTE ADULT - PROBLEM SELECTOR PLAN 5
Hgb on presentation 9.8, last hgb 11.7 1/2024. Pt denied hematemesis, hemoptysis, or melena. patient now with slightly pink urine draining from ruth cath. Likely AOCD and nutritional deficiencies i/s/o progressing lung cancer and significant weight loss. Iron studies from previous admission noted. U op from ruth (present at time of admission dark red.   - f/u UA (after catheter exchange)  - maintain active T&S  - f/u urology consult

## 2024-03-18 NOTE — CONSULT NOTE ADULT - ASSESSMENT
89 yo M w/ Squamous Cell lung ca (Stage 4)  not on active chemo or radiation, right sided pleural effusion s/p indwelling pleural catheter since jan 2024 who presents w/ worsening dyspnea, WBC 33 up from 29 at discharge, and increasing pleural fluid driange at his subacute rehab. Pulmonary is consulted for concern of increasing R pleural effusion.    WBC 33 on admission   2/28 - pleural fluid cyto +malignant cells  3/18 - pleural fluid analysis: LDH >2500, glucose low, neutrophil predominant 97% cell count w/ 13k wbc, 70k rbc, pH 7.57, total protein 2.2, albumin 1, glucose <2    # Squamous Cell Lung Ca, stage 4  # Recurrent Right Pleural Effusion s/p IPC 1/2024  - presents w/ worsening dyspnea, increased needs for drainage at subacute rehab of his pleural effusion, and leukocytosis of 33  - fluid drained 200 cc out on 3/18, sent for analysis above, neutrophil predominant w/ high LDH and low glucose. c/f infection in pleural space  PLAN  - s/p drainage today w/ 200 cc out (improvement in effusion on US after drainage)  - c/w broad abx (vancomycin/zosyn)  - f/u pleural fluid cultures and guide abx  - patient also has cough/sputum production and difficulty clearing, could have a bacterial pneumonia so recommend obtaining sputum culture, MRSA nares  - provide aerobika to help w/ clearance of secretions    S/D/E with Dr. Plummer

## 2024-03-18 NOTE — H&P ADULT - PROBLEM SELECTOR PLAN 8
Patient with Hx of BPH on medications outpatient. Retaining on previous admission. Trial of straight cath was unsuccessful therefore urology was called for Ruth placement. Trial of straight cath was unsuccessful therefore urology was called for Ruth placement, and requirment volume rescucitation i/s/o post-obstructive diuresis. Patient with ruth on present on this admission. Will require exchange prior to U sampling.   - c/w terazosin + finasteride

## 2024-03-18 NOTE — H&P ADULT - PROBLEM SELECTOR PLAN 10
- BMI 18 >20 pound weight loss over past 2 weeks.  - per nutrition consult from last visit recommended regular diet with Ensure Enlive twice per day.

## 2024-03-18 NOTE — H&P ADULT - PROBLEM SELECTOR PLAN 5
Hgb on presentation 9.8, last hgb 11.7 1/2024. Pt denied hematemesis, hemoptysis, or melena. patient now with slightly pink urine draining from ruth cath. Likely AOCD and nutritional deficiencies i/s/o progressing lung cancer and significant weight loss. Iron studies from previous admission noted.  - f/u UA   - maintain active T&S Hgb on presentation 9.8, last hgb 11.7 1/2024. Pt denied hematemesis, hemoptysis, or melena. patient now with slightly pink urine draining from ruth cath. Likely AOCD and nutritional deficiencies i/s/o progressing lung cancer and significant weight loss. Iron studies from previous admission noted. U op from ruth (present at time of admission dark red.   - f/u UA (after catheter exchange)  - maintain active T&S  - f/u urology consult

## 2024-03-18 NOTE — H&P ADULT - ASSESSMENT
88M PMH squamous cell lung cancer, BPH, peripheral neuropathy, and anxiety presenting with progressive dyspnea i/s/o known MPE and IPC. Was recently seen in 02/2024 and was supposed to f/u with pulm, o/p (refused) not presenting with worsening R pleural effusion.

## 2024-03-18 NOTE — PATIENT PROFILE ADULT - FUNCTIONAL ASSESSMENT - BASIC MOBILITY 6.
1-calculated by average/Not able to assess (calculate score using [facility] averaging method)

## 2024-03-18 NOTE — H&P ADULT - PROBLEM SELECTOR PLAN 7
I/s/o multiple medical problems including known malignancy, chronic gait instability. Progressive weight loss since lung cancer diagnosis. Previously lived alone but was unable to cook for self i/s/o fatigue and unstable gait. Was precviously discharged with regular diet, supplements and vitamins. PT evaluated and recommended [facility]. Palliative follow up was scheduled with Dr Rodriguez   - f/u dysphagia screen   - c/w Regular diet, recommend Ensure Max Protein 2x/day (150 kcal, 30 g protein per serving)   - f/u palliative consult in AM

## 2024-03-18 NOTE — H&P ADULT - PROBLEM SELECTOR PLAN 1
likely 2/2 PNA: Patient meeting SIRS criteria (HR >90 and Leukocytosis). Leukocytosis worsening from previous admission. Likely iso parapneumonic effusion. Note that per pulm patient was to be discharged on levoquin however medication was too expensive and augmentin was chosen as a treatment. s/p MIST II protocol and Vancomycin (d/c'd 2/25)   - start zosyn (pseudomonal coverage) + vancomycin   - f/u MRSA swab, U legionella and U strep   - f/u BCx, UCx, SCx, UA, RVP    - 500cc @ 125cc/h stop after 2 hours   - Trend WBC likely 2/2 PNA: Patient meeting SIRS criteria (HR >90 and Leukocytosis). Leukocytosis worsening from previous admission. Likely iso parapneumonic effusion. Note that per pulm patient was to be discharged on levoquin however medication was too expensive and augmentin was chosen as a treatment. s/p MIST II protocol and Vancomycin (d/c'd 2/25). Patient with worsened productive cough on admission however unable to mobilize secretions.   - start zosyn (pseudomonal coverage) + vancomycin   - f/u MRSA swab, U legionella and U strep   - f/u BCx, UCx, SCx, UA, RVP    - 500cc @ 125cc/h stop after 2 hours   - Trend WBC  - pulm toilet q6h  - azithro 500mg IVPB q24h

## 2024-03-18 NOTE — PROGRESS NOTE ADULT - PROBLEM SELECTOR PLAN 7
I/s/o multiple medical problems including known malignancy, chronic gait instability. Progressive weight loss since lung cancer diagnosis. Previously lived alone but was unable to cook for self i/s/o fatigue and unstable gait. Was precviously discharged with regular diet, supplements and vitamins. PT evaluated and recommended [facility]. Palliative follow up was scheduled with Dr Rodriguez   - f/u dysphagia screen   - c/w Regular diet, recommend Ensure Max Protein 2x/day (150 kcal, 30 g protein per serving)   - f/u palliative consult in AM

## 2024-03-18 NOTE — PROGRESS NOTE ADULT - PROBLEM SELECTOR PLAN 4
s/p dornase. cultures ng. fluids c/w parapnemonic effusion.  - pulm following  - c/w broad abx for now s/p dornase. cultures ng. fluids c/w parapnemonic effusion.  - pulm following  - c/w broad abx for now  - f/u pleural fluid studies

## 2024-03-18 NOTE — H&P ADULT - PROBLEM SELECTOR PLAN 4
s/p dornase. cultures ng. fluids c/w parapnemonic effusion.  - pulm following  - c/w broad abx for now

## 2024-03-18 NOTE — H&P ADULT - PROBLEM SELECTOR PLAN 3
seen by pulm on previous admissin for MPE s/p IPC placement by Dr. Lawrence. bedside US on previous admission in 02/2024, right sided small pleural effusion with visible septations. Per Dr. Lawrence who placed pleurx, effusion was septated when pleurx was initially placed. cell count shows 95% neutrophils. Glucose 14 and LDH >2500, concerning for complicated parapneumonic effusion. Pt now s/p MIST2 protocol. CTAP at that admission showed subcarinal and right hilar LAD and b/l pleural effusion with right sided hydropneumothorax. Pleural fluid cyto POSITIVE FOR MALIGNANT CELLS. Keratinizing  squamous cell carcinoma, predominantly dispersed cells, in background of purulent inflammation. Patient was supposed to have follwed with pulm as outpatient however refused to attend f/u appt. Subsequently required EOD drainage of pleurx while at [facility]. Pulm consulted in ED.   - f/u with pulm in AM

## 2024-03-18 NOTE — PROGRESS NOTE ADULT - PROBLEM SELECTOR PLAN 1
likely 2/2 PNA: Patient meeting SIRS criteria (HR >90 and Leukocytosis). Leukocytosis worsening from previous admission. Likely iso parapneumonic effusion. Note that per pulm patient was to be discharged on levoquin however medication was too expensive and augmentin was chosen as a treatment. s/p MIST II protocol and Vancomycin (d/c'd 2/25). Patient with worsened productive cough on admission however unable to mobilize secretions.   - start zosyn (pseudomonal coverage) + vancomycin   - f/u MRSA swab, U legionella and U strep   - f/u BCx, UCx, SCx, UA, RVP    - 500cc @ 125cc/h stop after 2 hours   - Trend WBC  - pulm toilet q6h  - azithro 500mg IVPB q24h

## 2024-03-18 NOTE — PROGRESS NOTE ADULT - PROBLEM SELECTOR PROBLEM 10
Nursing Home Follow Up Note      Luigi Urena DO []   LATONIA Tony [x]  [address] [place] [address]  Phone: [phone]  Fax: [phone] Marcell Christina MD []    Rm Tran DO []   [address] [place] [address]  Phone: [phone]  Fax: [phone]     PATIENT NAME: Viviane Peñaloza                                                                          YOB: 1957           DATE OF SERVICE: 12/1/2020  FACILITY:  [][facility]   [] [facility]   [x] [facility]   [] [facility]   [] Other ______________________________________________________________________      CHIEF COMPLAINT:    Cough and weakness        HISTORY OF PRESENT ILLNESS:     Video visit performed with patient today since she is in Covid unit for Covid infection patient has complaints of continued cough.  The Mucinex is helping her cough the mucus up though.  Chest x-ray negative last week.  She reports that she does not feel bad just has a cough that will not go away and feels weaker.  She had a fall earlier today, her legs just became weak and gave out on her.  She has been afebrile and other vital signs within normal limits as well.    PAST MEDICAL & SURGICAL HISTORY:   Past Medical History:   Diagnosis Date   • Epilepsy (CMS/HCC)    • Fracture, clavicle     left    • Shoulder fracture, left       Past Surgical History:   Procedure Laterality Date   • OOPHORECTOMY           MEDICATIONS:  I have reviewed and reconciled the patients medication list in the patients chart at the skilled nursing facility today.      ALLERGIES:  No Known Allergies      SOCIAL HISTORY:  Social History     Socioeconomic History   • Marital status: Single     Spouse name: Not on file   • Number of children: Not on file   • Years of education: Not on file   • Highest education level: Not on file   Tobacco Use   • Smoking status: Never Smoker   • Smokeless tobacco: Never Used   Substance and Sexual Activity   • Alcohol use: No   •  Drug use: No   • Sexual activity: Defer       FAMILY HISTORY:  Family History   Problem Relation Age of Onset   • Diabetes Mother    • Heart disease Mother    • Hypertension Mother    • Hypertension Father    • Diabetes Sister    • Hypertension Sister        REVIEW OF SYSTEMS:  Review of Systems   Constitutional: Negative for activity change, appetite change, chills, diaphoresis, fatigue, fever, unexpected weight gain and unexpected weight loss.   HENT: Negative for congestion, ear pain, mouth sores, nosebleeds, postnasal drip, rhinorrhea, sinus pressure, sneezing, sore throat, swollen glands and trouble swallowing.    Eyes: Negative for blurred vision, pain, discharge, redness, itching and visual disturbance.   Respiratory: Positive for cough. Negative for apnea, choking, chest tightness, shortness of breath, wheezing and stridor.    Cardiovascular: Negative for chest pain, palpitations and leg swelling.   Gastrointestinal: Negative for abdominal distention, abdominal pain, blood in stool, constipation, diarrhea, nausea, vomiting, GERD and indigestion.   Endocrine: Negative for polydipsia and polyuria.   Genitourinary: Negative for decreased urine volume, difficulty urinating, dysuria, flank pain, frequency, hematuria, urgency and urinary incontinence.   Musculoskeletal: Positive for arthralgias, gait problem and myalgias. Negative for back pain and joint swelling.   Skin: Negative for color change, dry skin, rash and skin lesions.   Allergic/Immunologic: Negative for environmental allergies.   Neurological: Positive for seizures and weakness. Negative for dizziness, speech difficulty, memory problem and confusion.   Psychiatric/Behavioral: Negative for behavioral problems, dysphoric mood, hallucinations, sleep disturbance and depressed mood. The patient is not nervous/anxious.          PHYSICAL EXAMINATION:   VITAL SIGNS:     Vitals:    12/01/20 1449   BP: 111/63   Pulse: 78   Resp: 16   Temp: 98.8 °F (37.1 °C)    SpO2: 95%   Weight: 93.9 kg (207 lb)       Physical Exam   Constitutional: She appears well-developed and well-nourished.   HENT:   Head: Normocephalic.   Right Ear: External ear normal.   Left Ear: External ear normal.   Nose: Nose normal.   Eyes: Conjunctivae are normal.   Neck: Normal range of motion.   Cardiovascular: Normal rate.   Pulmonary/Chest: Effort normal. No respiratory distress.   Abdominal: She exhibits no distension.   Musculoskeletal: No deformity.      Right knee: She exhibits normal range of motion, no swelling, no effusion, no ecchymosis, no deformity, no erythema and normal alignment. No patellar tendon tenderness noted.      Left knee: She exhibits decreased range of motion. Tenderness found.      Comments: Painful ROM of left knee     Lower extremity weakness   Neurological: She is alert.   Skin: Skin is warm and dry. No rash noted.   Psychiatric: Her behavior is normal.   Nursing note and vitals reviewed.      RECORDS REVIEW:   I have reviewed and interpreted the most recent labs    ASSESSMENT     Diagnoses and all orders for this visit:    1. COVID-19 virus infection (Primary)    2. Cough    3. Bilateral leg weakness    4. Fall, initial encounter    5. Impaired mobility and ADLs        PLAN     COVID-19/respiratory congestion cough  -Video visit performed with patient today since she is in Covid unit.  Patient with known Covid viral infection.  Chest x-ray negative a few days ago.  Continue Mucinex and nebs as directed.  She has no other signs or symptoms and vital signs within normal limits.  Will continue to monitor    Weakness/fall  -Patient advised that she will have some increased weakness due to Covid infection so she needs to call for assistance with all ADLs.  She voiced understanding.    Continue supportive care as needed for mobilization and any assistance with ADLs.       Patient was encouraged to keep me informed of any acute changes, lack of improvement, or any new concerning  symptoms.      [x]  Discussed Patient in detail with nursing/staff, addressed all needs today.     [x]  Plan of Care Reviewed   []  PT/OT Reviewed   [x]  Order Changes  []  Discharge Plans Reviewed  [x]  Advance Directive on file with Nursing Home.   [x]  POA on file with Nursing Home.   []  Code Status listed: [x]  Full Code   []  DNR       “I confirm accuracy of unchanged data/findings which have been carried forward from previous visit, as well as I have updated appropriately those that have changed.”                Marlene Garcia, APRN.      Protein calorie malnutrition

## 2024-03-18 NOTE — ED PROVIDER NOTE - PROGRESS NOTE DETAILS
wbc count 33.87 (was 29.15 at time of discharge)  baseline anemia hgb 8.7 / hct 28.6  labs otherwise ok - lactate 2.  CXR w increased fluid in R lung compared to most recent CXR in our system.    discussed w pulmonology. agrees w admission - will need to reculture fluid which was planned to be done at f/u appt that pt missed. also likely will need second procedure for thoracentesis.   pt then febrile rectally to 100. given tylenol.   pt admitted to medicine, discussed readmission w night hospitalist. vitals stable on 4L NC. wbc count 33.87 (was 29.15 at time of discharge)  baseline anemia hgb 8.7 / hct 28.6  labs otherwise ok - lactate 2.  CXR w increased fluid in R lung compared to most recent CXR in our system.    discussed w pulmonology. agrees w admission - will need to reculture fluid which was planned to be done at f/u appt that pt missed. also likely will need second procedure for thoracentesis.   pt then febrile rectally to 100. given tylenol.   pt admitted to medicine, discussed readmission w night hospitalist. vitals stable on 4L NC. HR high 90s. normotensive. not tachypenic at rest.

## 2024-03-18 NOTE — H&P ADULT - NSHPPHYSICALEXAM_GEN_ALL_CORE
.  VITAL SIGNS:  T(C): 36.6 (03-18-24 @ 01:37), Max: 37.9 (03-17-24 @ 23:58)  T(F): 97.8 (03-18-24 @ 01:37), Max: 100.2 (03-17-24 @ 23:58)  HR: 107 (03-18-24 @ 01:37) (98 - 107)  BP: 95/58 (03-18-24 @ 01:37) (95/58 - 109/68)  BP(mean): --  RR: 18 (03-18-24 @ 01:37) (16 - 18)  SpO2: 94% (03-18-24 @ 01:37) (94% - 100%)  Wt(kg): --    PHYSICAL EXAM:    Constitutional: elderly cachectic patient resting comfortably in bed; NAD  Head: NC/AT  Eyes: PERRL, anicteric sclera  ENT: no nasal discharge; MM dry   Neck:  no JVD   Respiratory: decreased lung sound in RLL, LLL crackles, rhonchorous throughout; no Wheezes or rhlaes, no retractions  Cardiac: +S1/S2; irregular rhythm; no M/R/G  Gastrointestinal: abdomen soft, NT/ND; no rebound or guarding; +BSx4  Extremities: WWP, no clubbing or cyanosis; no peripheral edema  Musculoskeletal: NROM x4; no joint swelling, tenderness or erythema  Vascular: 2+ radial, DP/PT pulses B/L  Dermatologic: skin warm, dry and intact; no rashes, wounds, or scars  Neurologic: AAOx3; CNII-XII grossly intact; no focal deficits  Psychiatric: affect and characteristics of appearance, verbalizations, behaviors are appropriate .  VITAL SIGNS:  T(C): 36.6 (03-18-24 @ 01:37), Max: 37.9 (03-17-24 @ 23:58)  T(F): 97.8 (03-18-24 @ 01:37), Max: 100.2 (03-17-24 @ 23:58)  HR: 107 (03-18-24 @ 01:37) (98 - 107)  BP: 95/58 (03-18-24 @ 01:37) (95/58 - 109/68)  BP(mean): --  RR: 18 (03-18-24 @ 01:37) (16 - 18)  SpO2: 94% (03-18-24 @ 01:37) (94% - 100%)  Wt(kg): --    PHYSICAL EXAM:    Constitutional: elderly cachectic patient resting comfortably in bed; NAD  Head: NC/AT  Eyes: PERRL, anicteric sclera  ENT: no nasal discharge; MM dry   Neck:  no JVD   Respiratory: decreased lung sound in RLL, LLL crackles, rhonchorous throughout; no Wheezes or rhlaes, no retractions  Cardiac: +S1/S2; irregular rhythm; 3/6 systolic murmur at RUSB radiating to carotids, no R/G  Gastrointestinal: abdomen soft, NT/ND; no rebound or guarding; +BSx4  Extremities: WWP, no clubbing or cyanosis; no peripheral edema  Musculoskeletal: NROM x4; no joint swelling, tenderness or erythema  Vascular: 2+ radial, DP/PT pulses B/L  Dermatologic: skin warm, dry and intact; no rashes, wounds, or scars  Neurologic: AAOx3; CNII-XII grossly intact; no focal deficits  Psychiatric: affect and characteristics of appearance, verbalizations, behaviors are appropriate

## 2024-03-18 NOTE — H&P ADULT - PROBLEM SELECTOR PLAN 12
F: Encourage PO  E: K>4 Mg>2  N: Regular + 2 ensure max protein per day   GI: None  DVT: plan to give one dose of SQ heparin i/s/o likely procedure   Dispo: [facility] --> likely to [facility] pending PT eval

## 2024-03-18 NOTE — CONSULT NOTE ADULT - ASSESSMENT
88 M, known to Palliative Care team, with squamous cell lung cancer, malignant pleural effusion, shortness of breath, debility, encounter for palliative care.

## 2024-03-18 NOTE — PATIENT PROFILE ADULT - FUNCTIONAL ASSESSMENT - DAILY ACTIVITY 4.
Chief Complaint:   Chief Complaint   Patient presents with   • Follow-up     Atrial fibrillation, paroxysmal       HISTORY OF PRESENT ILLNESS     Feels well.  No new complaints.  Occasionally feels the heart slightly fast and irregular about once a month.  Tolerating medications.    PHYSICAL EXAM     Vitals:  Visit Vitals  BP (!) 150/84   Pulse (!) 51   Ht 5' 8\" (1.727 m)   Wt 72.6 kg (160 lb)   BMI 24.33 kg/m²       Comfortable, in no apparent distress.  Lungs clear.  Cardiovascular, regular in rhythm.      ECG: Sinus rhythm with acceptable corrected QT interval.    LABORATORY DATA     Sodium (mmol/L)   Date Value   01/20/2023 134 (L)     Potassium (mmol/L)   Date Value   01/20/2023 4.3     Chloride (mmol/L)   Date Value   01/20/2023 101     Glucose (mg/dL)   Date Value   01/20/2023 90     Calcium (mg/dL)   Date Value   01/20/2023 9.6     Carbon Dioxide (mmol/L)   Date Value   01/20/2023 21     BUN (mg/dL)   Date Value   01/20/2023 31 (H)     Creatinine (mg/dL)   Date Value   01/20/2023 2.62 (H)     No results found for: \"GFRNA\"  WBC (K/mcL)   Date Value   01/20/2023 7.7     RBC (mil/mcL)   Date Value   01/20/2023 4.85     HCT (%)   Date Value   01/20/2023 48.3     HGB (g/dL)   Date Value   01/20/2023 15.6     PLT (K/mcL)   Date Value   01/20/2023 247     TSH (mcUnits/mL)   Date Value   12/07/2022 1.744     GOT/AST (Units/L)   Date Value   12/07/2022 18     GPT/ALT (Units/L)   Date Value   12/07/2022 22         DIAGNOSTIC TESTING     ECHO 02/16/2023  Final Impressions  Moderate to severely increased left ventricular wall thickness.  Left ventricular ejection fraction; 55 %.  Grade II diastolic dysfunction of the left ventricle (pseudonormal filling pattern).  LV Global longitudinal strain -8.9 %.  Mild to moderate aortic valve stenosis; mean gradient 17 mmHg, DELMAR 1.6 cm2.  Aortic valve dimensionless index, (VTIs): 0.42.  Mild aortic valve regurgitation.  Right ventricular systolic pressure; 32 mmHg.  Small plaque  in the abdominal aorta.  As compared to previous echocardiogram dated 10/07/2022, images personally reviewed, there is no significant change.    ASSESSMENT/PLAN     1. Recurrent paroxysmal atrial fibrillation that predated bypass surgery.  Overall, symptoms under good control with amiodarone.    2. Coronary artery disease status post bypass surgery in October 2022.  Chest pain free  3. Mild-to-moderate aortic stenosis   4. Chronic kidney disease stage 4   5. Diabetes  6. History of prior MI  7. Hypertension  8. Emphysema  9. Status post left atrial appendage ligation, therefore not on anticoagulation.  CHADSVASC score is three.  10. High-risk medication use-amiodarone:  Tolerating well.  No evidence of amiodarone toxicity at this time.  Check TSH and LFTs.  11. Normal EF on latest echocardiogram from February 2023     Overall, good control of atrial fibrillation on current regimen.  Continue current therapy.  Follow-up in six months or as needed.      On 7/31/2023, Cara CASTAÑEDA scribed the services personally performed by Loco Humphrey MD.      The documentation recorded by the scribe accurately and completely reflects the service(s) I personally performed and the decisions made by me.        3 = A little assistance

## 2024-03-19 DIAGNOSIS — J90 PLEURAL EFFUSION, NOT ELSEWHERE CLASSIFIED: ICD-10-CM

## 2024-03-19 LAB
ALBUMIN SERPL ELPH-MCNC: 2.2 G/DL — LOW (ref 3.3–5)
ALP SERPL-CCNC: 147 U/L — HIGH (ref 40–120)
ALT FLD-CCNC: 10 U/L — SIGNIFICANT CHANGE UP (ref 10–45)
ANION GAP SERPL CALC-SCNC: 11 MMOL/L — SIGNIFICANT CHANGE UP (ref 5–17)
ANISOCYTOSIS BLD QL: SLIGHT — SIGNIFICANT CHANGE UP
APPEARANCE UR: CLEAR — SIGNIFICANT CHANGE UP
AST SERPL-CCNC: 14 U/L — SIGNIFICANT CHANGE UP (ref 10–40)
BACTERIA # UR AUTO: ABNORMAL /HPF
BASOPHILS # BLD AUTO: 0 K/UL — SIGNIFICANT CHANGE UP (ref 0–0.2)
BASOPHILS NFR BLD AUTO: 0 % — SIGNIFICANT CHANGE UP (ref 0–2)
BILIRUB SERPL-MCNC: 0.3 MG/DL — SIGNIFICANT CHANGE UP (ref 0.2–1.2)
BILIRUB UR-MCNC: NEGATIVE — SIGNIFICANT CHANGE UP
BLD GP AB SCN SERPL QL: NEGATIVE — SIGNIFICANT CHANGE UP
BUN SERPL-MCNC: 16 MG/DL — SIGNIFICANT CHANGE UP (ref 7–23)
CALCIUM SERPL-MCNC: 8.9 MG/DL — SIGNIFICANT CHANGE UP (ref 8.4–10.5)
CAST: 2 /LPF — SIGNIFICANT CHANGE UP (ref 0–4)
CHLORIDE SERPL-SCNC: 95 MMOL/L — LOW (ref 96–108)
CO2 SERPL-SCNC: 25 MMOL/L — SIGNIFICANT CHANGE UP (ref 22–31)
COD CRY URNS QL: PRESENT
COLOR SPEC: YELLOW — SIGNIFICANT CHANGE UP
CREAT SERPL-MCNC: 0.84 MG/DL — SIGNIFICANT CHANGE UP (ref 0.5–1.3)
CYSTATIN C SERPL-MCNC: 1.35 MG/L — SIGNIFICANT CHANGE UP
DACRYOCYTES BLD QL SMEAR: SLIGHT — SIGNIFICANT CHANGE UP
DIFF PNL FLD: ABNORMAL
EGFR: 84 ML/MIN/1.73M2 — SIGNIFICANT CHANGE UP
EOSINOPHIL # BLD AUTO: 0 K/UL — SIGNIFICANT CHANGE UP (ref 0–0.5)
EOSINOPHIL NFR BLD AUTO: 0 % — SIGNIFICANT CHANGE UP (ref 0–6)
GFR/BSA.PRED SERPLBLD CYS-BASED-ARV: 47 ML/MIN/1.73M2 — LOW
GLUCOSE SERPL-MCNC: 103 MG/DL — HIGH (ref 70–99)
GLUCOSE UR QL: NEGATIVE MG/DL — SIGNIFICANT CHANGE UP
HCT VFR BLD CALC: 27.3 % — LOW (ref 39–50)
HGB BLD-MCNC: 8.4 G/DL — LOW (ref 13–17)
HYPOCHROMIA BLD QL: SIGNIFICANT CHANGE UP
KETONES UR-MCNC: NEGATIVE MG/DL — SIGNIFICANT CHANGE UP
LEGIONELLA AG UR QL: NEGATIVE — SIGNIFICANT CHANGE UP
LEUKOCYTE ESTERASE UR-ACNC: ABNORMAL
LYMPHOCYTES # BLD AUTO: 1.23 K/UL — SIGNIFICANT CHANGE UP (ref 1–3.3)
LYMPHOCYTES # BLD AUTO: 3.5 % — LOW (ref 13–44)
MAGNESIUM SERPL-MCNC: 2 MG/DL — SIGNIFICANT CHANGE UP (ref 1.6–2.6)
MANUAL SMEAR VERIFICATION: SIGNIFICANT CHANGE UP
MCHC RBC-ENTMCNC: 25 PG — LOW (ref 27–34)
MCHC RBC-ENTMCNC: 30.8 GM/DL — LOW (ref 32–36)
MCV RBC AUTO: 81.3 FL — SIGNIFICANT CHANGE UP (ref 80–100)
MONOCYTES # BLD AUTO: 0.32 K/UL — SIGNIFICANT CHANGE UP (ref 0–0.9)
MONOCYTES NFR BLD AUTO: 0.9 % — LOW (ref 2–14)
NEUTROPHILS # BLD AUTO: 33.6 K/UL — HIGH (ref 1.8–7.4)
NEUTROPHILS NFR BLD AUTO: 95.6 % — HIGH (ref 43–77)
NITRITE UR-MCNC: NEGATIVE — SIGNIFICANT CHANGE UP
OVALOCYTES BLD QL SMEAR: SIGNIFICANT CHANGE UP
PH UR: 6 — SIGNIFICANT CHANGE UP (ref 5–8)
PHOSPHATE SERPL-MCNC: 2.8 MG/DL — SIGNIFICANT CHANGE UP (ref 2.5–4.5)
PLAT MORPH BLD: NORMAL — SIGNIFICANT CHANGE UP
PLATELET # BLD AUTO: 468 K/UL — HIGH (ref 150–400)
POIKILOCYTOSIS BLD QL AUTO: SIGNIFICANT CHANGE UP
POLYCHROMASIA BLD QL SMEAR: SLIGHT — SIGNIFICANT CHANGE UP
POTASSIUM SERPL-MCNC: 3.6 MMOL/L — SIGNIFICANT CHANGE UP (ref 3.5–5.3)
POTASSIUM SERPL-SCNC: 3.6 MMOL/L — SIGNIFICANT CHANGE UP (ref 3.5–5.3)
PROT SERPL-MCNC: 5.3 G/DL — LOW (ref 6–8.3)
PROT UR-MCNC: SIGNIFICANT CHANGE UP MG/DL
RBC # BLD: 3.36 M/UL — LOW (ref 4.2–5.8)
RBC # FLD: 18.6 % — HIGH (ref 10.3–14.5)
RBC BLD AUTO: ABNORMAL
RBC CASTS # UR COMP ASSIST: 823 /HPF — HIGH (ref 0–4)
RH IG SCN BLD-IMP: POSITIVE — SIGNIFICANT CHANGE UP
S PNEUM AG UR QL: NEGATIVE — SIGNIFICANT CHANGE UP
SMUDGE CELLS # BLD: PRESENT — SIGNIFICANT CHANGE UP
SODIUM SERPL-SCNC: 131 MMOL/L — LOW (ref 135–145)
SP GR SPEC: 1.02 — SIGNIFICANT CHANGE UP (ref 1–1.03)
SQUAMOUS # UR AUTO: 3 /HPF — SIGNIFICANT CHANGE UP (ref 0–5)
TARGETS BLD QL SMEAR: SLIGHT — SIGNIFICANT CHANGE UP
UROBILINOGEN FLD QL: 1 MG/DL — SIGNIFICANT CHANGE UP (ref 0.2–1)
WBC # BLD: 35.15 K/UL — HIGH (ref 3.8–10.5)
WBC # FLD AUTO: 35.15 K/UL — HIGH (ref 3.8–10.5)
WBC UR QL: 12 /HPF — HIGH (ref 0–5)

## 2024-03-19 PROCEDURE — 99222 1ST HOSP IP/OBS MODERATE 55: CPT

## 2024-03-19 PROCEDURE — 99233 SBSQ HOSP IP/OBS HIGH 50: CPT | Mod: GC

## 2024-03-19 PROCEDURE — 99232 SBSQ HOSP IP/OBS MODERATE 35: CPT | Mod: GC

## 2024-03-19 PROCEDURE — 99233 SBSQ HOSP IP/OBS HIGH 50: CPT

## 2024-03-19 RX ORDER — HEPARIN SODIUM 5000 [USP'U]/ML
5000 INJECTION INTRAVENOUS; SUBCUTANEOUS EVERY 8 HOURS
Refills: 0 | Status: DISCONTINUED | OUTPATIENT
Start: 2024-03-19 | End: 2024-03-20

## 2024-03-19 RX ORDER — LIDOCAINE 4 G/100G
1 CREAM TOPICAL ONCE
Refills: 0 | Status: COMPLETED | OUTPATIENT
Start: 2024-03-19 | End: 2024-03-19

## 2024-03-19 RX ADMIN — PIPERACILLIN AND TAZOBACTAM 25 GRAM(S): 4; .5 INJECTION, POWDER, LYOPHILIZED, FOR SOLUTION INTRAVENOUS at 15:26

## 2024-03-19 RX ADMIN — Medication 650 MILLIGRAM(S): at 16:31

## 2024-03-19 RX ADMIN — Medication 3 MILLILITER(S): at 04:08

## 2024-03-19 RX ADMIN — Medication 4 MILLIGRAM(S): at 21:40

## 2024-03-19 RX ADMIN — LIDOCAINE 1 PATCH: 4 CREAM TOPICAL at 14:52

## 2024-03-19 RX ADMIN — PANTOPRAZOLE SODIUM 40 MILLIGRAM(S): 20 TABLET, DELAYED RELEASE ORAL at 06:13

## 2024-03-19 RX ADMIN — PIPERACILLIN AND TAZOBACTAM 25 GRAM(S): 4; .5 INJECTION, POWDER, LYOPHILIZED, FOR SOLUTION INTRAVENOUS at 21:40

## 2024-03-19 RX ADMIN — Medication 650 MILLIGRAM(S): at 21:40

## 2024-03-19 RX ADMIN — Medication 250 MILLIGRAM(S): at 06:12

## 2024-03-19 RX ADMIN — Medication 3 MILLILITER(S): at 10:17

## 2024-03-19 RX ADMIN — AZITHROMYCIN 255 MILLIGRAM(S): 500 TABLET, FILM COATED ORAL at 02:16

## 2024-03-19 RX ADMIN — PIPERACILLIN AND TAZOBACTAM 25 GRAM(S): 4; .5 INJECTION, POWDER, LYOPHILIZED, FOR SOLUTION INTRAVENOUS at 06:12

## 2024-03-19 RX ADMIN — Medication 650 MILLIGRAM(S): at 22:40

## 2024-03-19 RX ADMIN — SODIUM CHLORIDE 4 MILLILITER(S): 9 INJECTION INTRAMUSCULAR; INTRAVENOUS; SUBCUTANEOUS at 04:08

## 2024-03-19 RX ADMIN — ATORVASTATIN CALCIUM 10 MILLIGRAM(S): 80 TABLET, FILM COATED ORAL at 21:40

## 2024-03-19 RX ADMIN — SODIUM CHLORIDE 4 MILLILITER(S): 9 INJECTION INTRAMUSCULAR; INTRAVENOUS; SUBCUTANEOUS at 16:31

## 2024-03-19 RX ADMIN — LIDOCAINE 1 PATCH: 4 CREAM TOPICAL at 03:27

## 2024-03-19 RX ADMIN — Medication 650 MILLIGRAM(S): at 17:25

## 2024-03-19 RX ADMIN — LIDOCAINE 1 PATCH: 4 CREAM TOPICAL at 07:36

## 2024-03-19 RX ADMIN — Medication 3 MILLILITER(S): at 16:30

## 2024-03-19 RX ADMIN — HEPARIN SODIUM 5000 UNIT(S): 5000 INJECTION INTRAVENOUS; SUBCUTANEOUS at 15:26

## 2024-03-19 RX ADMIN — SODIUM CHLORIDE 4 MILLILITER(S): 9 INJECTION INTRAMUSCULAR; INTRAVENOUS; SUBCUTANEOUS at 10:17

## 2024-03-19 RX ADMIN — HEPARIN SODIUM 5000 UNIT(S): 5000 INJECTION INTRAVENOUS; SUBCUTANEOUS at 21:41

## 2024-03-19 RX ADMIN — SODIUM CHLORIDE 4 MILLILITER(S): 9 INJECTION INTRAMUSCULAR; INTRAVENOUS; SUBCUTANEOUS at 21:40

## 2024-03-19 RX ADMIN — FINASTERIDE 5 MILLIGRAM(S): 5 TABLET, FILM COATED ORAL at 15:26

## 2024-03-19 RX ADMIN — Medication 3 MILLILITER(S): at 21:40

## 2024-03-19 NOTE — PROGRESS NOTE ADULT - PROBLEM SELECTOR PLAN 7
likely reactive in nature, with increase also likely d/t iron deficiency, and likely intravascular volume depletion.   - c/t monitor I/s/o multiple medical problems including known malignancy, chronic gait instability. Progressive weight loss since lung cancer diagnosis. Previously lived alone but was unable to cook for self i/s/o fatigue and unstable gait. Was precviously discharged with regular diet, supplements and vitamins. PT evaluated and recommended [facility]. Palliative follow up was scheduled with Dr Rodriguez   - c/w Regular diet, recommend Ensure Max Protein 2x/day (150 kcal, 30 g protein per serving)   - f/u palliative consult I/s/o multiple medical problems including known malignancy, chronic gait instability. Progressive weight loss since lung cancer diagnosis. Previously lived alone but was unable to cook for self i/s/o fatigue and unstable gait. Was precviously discharged with regular diet, supplements and vitamins. PT evaluated and recommended [facility]. Palliative follow up was scheduled with Dr Rodriguez   - c/w Regular diet, recommend Ensure Max Protein 2x/day (150 kcal, 30 g protein per serving)   - f/u palliative consult  - delirium precautions

## 2024-03-19 NOTE — PROGRESS NOTE ADULT - PROBLEM SELECTOR PLAN 9
Patient with Hx of BPH on medications outpatient. Retaining on previous admission. Trial of straight cath was unsuccessful therefore urology was called for Ruth placement. Trial of straight cath was unsuccessful therefore urology was called for Ruth placement, and requirment volume rescucitation i/s/o post-obstructive diuresis. Patient with ruth on present on this admission. Will require exchange prior to U sampling.   - c/w terazosin + finasteride Last seen urologist prior to COVID, per [facility] was seen in 2017. Takes terazosin 5mg and finasteride 5mg at home. Reports chronic nocturia and polyuria that has not particularly worsened.  - c/w home meds

## 2024-03-19 NOTE — CONSULT NOTE ADULT - REASON FOR ADMISSION
Worsening R malignant pleural effusion

## 2024-03-19 NOTE — PHYSICAL THERAPY INITIAL EVALUATION ADULT - MANUAL MUSCLE TESTING RESULTS, REHAB EVAL
Grossly 2+/5 throughout BUE shoulder flex/ext, and BLE hip flex and knee flex. Fair plus bilat  strength noted.

## 2024-03-19 NOTE — PROGRESS NOTE ADULT - SUBJECTIVE AND OBJECTIVE BOX
O/N Events: MAYANK    Subjective/ROS: Patient seen and examined at bedside. Resting comfortably, on RA. Reports feeling extremely exhausted. Would like to speak with sister Sakina.    VITALS  Vital Signs Last 24 Hrs  T(C): 36.4 (19 Mar 2024 06:28), Max: 37 (18 Mar 2024 21:20)  T(F): 97.5 (19 Mar 2024 06:28), Max: 98.6 (18 Mar 2024 21:20)  HR: 97 (19 Mar 2024 06:28) (97 - 106)  BP: 99/57 (19 Mar 2024 06:28) (92/56 - 101/53)  BP(mean): 71 (19 Mar 2024 06:28) (69 - 71)  RR: 18 (19 Mar 2024 06:28) (18 - 18)  SpO2: 95% (19 Mar 2024 06:28) (94% - 96%)    Parameters below as of 19 Mar 2024 06:28  Patient On (Oxygen Delivery Method): room air        CAPILLARY BLOOD GLUCOSE          PHYSICAL EXAM  General: NAD  Head: pupils reactive  Neck: Supple; no JVD  Respiratory: R sided crackles  Cardiovascular: Regular rhythm/rate; S1/S2+, no murmurs, rubs gallops   Gastrointestinal: Soft; NTND; bowel sounds normal and present  Extremities: WWP; no edema/cyanosis  Neurological: A&Ox2, waxing and waning    MEDICATIONS  (STANDING):  albuterol/ipratropium for Nebulization 3 milliLiter(s) Nebulizer every 6 hours  atorvastatin 10 milliGRAM(s) Oral at bedtime  azithromycin  IVPB      azithromycin  IVPB 500 milliGRAM(s) IV Intermittent every 24 hours  doxazosin 4 milliGRAM(s) Oral at bedtime  finasteride 5 milliGRAM(s) Oral daily  heparin   Injectable 5000 Unit(s) SubCutaneous every 8 hours  pantoprazole    Tablet 40 milliGRAM(s) Oral before breakfast  piperacillin/tazobactam IVPB.. 4.5 Gram(s) IV Intermittent every 8 hours  sodium chloride 3%  Inhalation 4 milliLiter(s) Inhalation every 6 hours  vancomycin  IVPB 1000 milliGRAM(s) IV Intermittent every 24 hours    MEDICATIONS  (PRN):  acetaminophen     Tablet .. 650 milliGRAM(s) Oral every 6 hours PRN Temp greater or equal to 38C (100.4F), Mild Pain (1 - 3)  clonazePAM Oral Disintegrating Tablet 0.125 milliGRAM(s) Oral daily PRN anxiety  melatonin 3 milliGRAM(s) Oral at bedtime PRN Sleep      No Known Allergies      LABS                        8.4    35.15 )-----------( 468      ( 19 Mar 2024 05:30 )             27.3     03-19    131<L>  |  95<L>  |  16  ----------------------------<  103<H>  3.6   |  25  |  0.84    Ca    8.9      19 Mar 2024 05:30  Phos  2.8     03-19  Mg     2.0     03-19    TPro  5.3<L>  /  Alb  2.2<L>  /  TBili  0.3  /  DBili  x   /  AST  14  /  ALT  10  /  AlkPhos  147<H>  03-19    PT/INR - ( 18 Mar 2024 07:16 )   PT: 13.6 sec;   INR: 1.20          PTT - ( 18 Mar 2024 07:16 )  PTT:32.5 sec  Urinalysis Basic - ( 19 Mar 2024 05:30 )    Color: x / Appearance: x / SG: x / pH: x  Gluc: 103 mg/dL / Ketone: x  / Bili: x / Urobili: x   Blood: x / Protein: x / Nitrite: x   Leuk Esterase: x / RBC: x / WBC x   Sq Epi: x / Non Sq Epi: x / Bacteria: x            Culture - Body Fluid with Gram Stain (collected 03-18-24 @ 12:37)  Source: .Body Fluid Pleural Fluid  Gram Stain (03-18-24 @ 16:58):    No organisms seen    Moderate White blood cells  Preliminary Report (03-19-24 @ 09:40):    No growth to date        IMAGING/EKG/ETC

## 2024-03-19 NOTE — PROGRESS NOTE ADULT - PROBLEM SELECTOR PLAN 2
likely 2/2 PNA: Patient meeting SIRS criteria (HR >90 and Leukocytosis). Leukocytosis worsening from previous admission. Likely iso parapneumonic effusion. Note that per pulm patient was to be discharged on levoquin however medication was too expensive and augmentin was chosen as a treatment. s/p MIST II protocol and Vancomycin (d/c'd 2/25). Patient with worsened productive cough on admission however unable to mobilize secretions.   - start zosyn (pseudomonal coverage) + vancomycin   - f/u MRSA swab, U legionella and U strep   - f/u BCx, UCx, SCx, UA, RVP    - 500cc @ 125cc/h stop after 2 hours   - Trend WBC  - pulm toilet q6h  - azithro 500mg IVPB q24h Patient meeting SIRS criteria (HR >90 and Leukocytosis). Leukocytosis worsening from previous admission. Likely iso parapneumonic effusion. Note that per pulm patient was to be discharged on levoquin however medication was too expensive and augmentin was chosen as a treatment. s/p MIST II protocol and Vancomycin (d/c'd 2/25). Patient with worsened productive cough on admission however unable to mobilize secretions. BCx NGTD. RPV +human metapneumo. MRSA neg, urine legionella and strep negative.  - zosyn (pseudomonal coverage) + vancomycin, de-escalate as appropriate  - f/u SCx   - pulm toilet q6h, aerobika  - azithro 500mg IVPB q24h

## 2024-03-19 NOTE — PROGRESS NOTE ADULT - PROBLEM SELECTOR PLAN 5
s/p dornase. cultures ng. fluids c/w parapnemonic effusion.  - pulm following  - c/w broad abx for now  - f/u pleural fluid studies Hgb on presentation 9.8, last hgb 11.7 1/2024. Pt denied hematemesis, hemoptysis, or melena. patient now with slightly pink urine draining from ruth cath. Likely AOCD and nutritional deficiencies i/s/o progressing lung cancer and significant weight loss. Iron studies from previous admission noted. U op from ruth (present at time of admission dark red.   - f/u UA (after catheter exchange)  - maintain active T&S

## 2024-03-19 NOTE — PROGRESS NOTE ADULT - PROBLEM SELECTOR PLAN 12
on home KlonoPIN 0.125 mg PO qd PRN for anxiety  - c/w home med F: None  E: K>4 Mg>2  N: Regular  GI: None  DVT: heparin subq  Dispo: [facility]

## 2024-03-19 NOTE — PHYSICAL THERAPY INITIAL EVALUATION ADULT - PERTINENT HX OF CURRENT PROBLEM, REHAB EVAL
88M PMH squamous cell lung cancer with pleural effusions s/p Pleurx placement prior to this hospitalization, BPH, peripheral neuropathy, and anxiety presenting with progressive dyspnea i/s/o known MPE and IPC. Was recently admitted to [facility] for further evaluation and management of Failure to Thrive. Course was c/b urinary retention needed ruth placement on 2/22. The patient is being followed by pulmonology for the Pleurx drainage, fluid studies were sent and there was concern for infection for which patient was started on Vanc and Zosyn. S/p TPA/Dornase MIST 2 protocol. Was supposed to f/u with pulm, o/p (refused) now presenting with worsening R pleural effusion. Per ED provider patient refused follow up and [facility] was able to reach pulm team who recommended increased pleurx drainage to EOD. Despite this change patient has reaccumulated pleural effusion on R (worse than at time of previous discharge). Pulm may require additional pleural fluid studies/thoracentesis in AM. Patient requiring additional O2 supplementation at time of admission.

## 2024-03-19 NOTE — PROGRESS NOTE ADULT - PROBLEM SELECTOR PLAN 11
- BMI 18 >20 pound weight loss over past 2 weeks.  - per nutrition consult from last visit recommended regular diet with Ensure Enlive twice per day. on home KlonoPIN 0.125 mg PO qd PRN for anxiety  - c/w home med

## 2024-03-19 NOTE — PROGRESS NOTE ADULT - SUBJECTIVE AND OBJECTIVE BOX
PULMONARY CONSULT SERVICE FOLLOW-UP NOTE    INTERVAL HPI:  Reviewed chart and overnight events; patient seen and examined.     MEDICATIONS:  Pulmonary:  albuterol/ipratropium for Nebulization 3 milliLiter(s) Nebulizer every 6 hours  sodium chloride 3%  Inhalation 4 milliLiter(s) Inhalation every 6 hours    Antimicrobials:  azithromycin  IVPB      azithromycin  IVPB 500 milliGRAM(s) IV Intermittent every 24 hours  piperacillin/tazobactam IVPB.. 4.5 Gram(s) IV Intermittent every 8 hours  vancomycin  IVPB 1000 milliGRAM(s) IV Intermittent every 24 hours    Anticoagulants:    Cardiac:  doxazosin 4 milliGRAM(s) Oral at bedtime      Allergies    No Known Allergies    Intolerances        Vital Signs Last 24 Hrs  T(C): 36.4 (19 Mar 2024 06:28), Max: 37 (18 Mar 2024 21:20)  T(F): 97.5 (19 Mar 2024 06:28), Max: 98.6 (18 Mar 2024 21:20)  HR: 97 (19 Mar 2024 06:28) (97 - 106)  BP: 99/57 (19 Mar 2024 06:28) (92/56 - 101/53)  BP(mean): 71 (19 Mar 2024 06:28) (69 - 71)  RR: 18 (19 Mar 2024 06:28) (18 - 18)  SpO2: 95% (19 Mar 2024 06:28) (94% - 96%)    Parameters below as of 19 Mar 2024 06:28  Patient On (Oxygen Delivery Method): room air        03-18 @ 07:01  -  03-19 @ 07:00  --------------------------------------------------------  IN: 650 mL / OUT: 900 mL / NET: -250 mL          PHYSICAL EXAM:  Constitutional: well-appearing, in no apparent respiratory distress  HEENT: NC/AT; PERRL, anicteric sclera; moist mucous membranes  Neck: supple, no JVD or LAD appreciated  Cardiovascular: +S1/S2, Regular rate and rhythm, no murmurs appreciated   Respiratory: Clear breath sounds bilaterally. No wheezes, rhonchi or rales   Gastrointestinal: soft, non-tender, non-distended. Normoactive bowel sounds.   Extremities: no edema, clubbing or cyanosis  Vascular: 2+ radial pulses B/L  Neurological: awake and alert; oriented x3    LABS:      CBC Full  -  ( 19 Mar 2024 05:30 )  WBC Count : 35.15 K/uL  RBC Count : 3.36 M/uL  Hemoglobin : 8.4 g/dL  Hematocrit : 27.3 %  Platelet Count - Automated : 468 K/uL  Mean Cell Volume : 81.3 fl  Mean Cell Hemoglobin : 25.0 pg  Mean Cell Hemoglobin Concentration : 30.8 gm/dL  Auto Neutrophil # : x  Auto Lymphocyte # : x  Auto Monocyte # : x  Auto Eosinophil # : x  Auto Basophil # : x  Auto Neutrophil % : x  Auto Lymphocyte % : x  Auto Monocyte % : x  Auto Eosinophil % : x  Auto Basophil % : x    03-19    131<L>  |  95<L>  |  16  ----------------------------<  103<H>  3.6   |  25  |  0.84    Ca    8.9      19 Mar 2024 05:30  Phos  2.8     03-19  Mg     2.0     03-19    TPro  5.3<L>  /  Alb  2.2<L>  /  TBili  0.3  /  DBili  x   /  AST  14  /  ALT  10  /  AlkPhos  147<H>  03-19    PT/INR - ( 18 Mar 2024 07:16 )   PT: 13.6 sec;   INR: 1.20          PTT - ( 18 Mar 2024 07:16 )  PTT:32.5 sec                RADIOLOGY & ADDITIONAL STUDIES: PULMONARY CONSULT SERVICE FOLLOW-UP NOTE    INTERVAL HPI:  Reviewed chart and overnight events; patient seen and examined. Reports continued cough, difficulty bringing up sputum but some help w/ aerobika. Denies fevers. SOB stable but intermittently becomes difficult. Does not recall being drained via pleurx yesterday    MEDICATIONS:  Pulmonary:  albuterol/ipratropium for Nebulization 3 milliLiter(s) Nebulizer every 6 hours  sodium chloride 3%  Inhalation 4 milliLiter(s) Inhalation every 6 hours    Antimicrobials:  azithromycin  IVPB      azithromycin  IVPB 500 milliGRAM(s) IV Intermittent every 24 hours  piperacillin/tazobactam IVPB.. 4.5 Gram(s) IV Intermittent every 8 hours  vancomycin  IVPB 1000 milliGRAM(s) IV Intermittent every 24 hours    Anticoagulants:    Cardiac:  doxazosin 4 milliGRAM(s) Oral at bedtime      Allergies    No Known Allergies    Intolerances        Vital Signs Last 24 Hrs  T(C): 36.4 (19 Mar 2024 06:28), Max: 37 (18 Mar 2024 21:20)  T(F): 97.5 (19 Mar 2024 06:28), Max: 98.6 (18 Mar 2024 21:20)  HR: 97 (19 Mar 2024 06:28) (97 - 106)  BP: 99/57 (19 Mar 2024 06:28) (92/56 - 101/53)  BP(mean): 71 (19 Mar 2024 06:28) (69 - 71)  RR: 18 (19 Mar 2024 06:28) (18 - 18)  SpO2: 95% (19 Mar 2024 06:28) (94% - 96%)    Parameters below as of 19 Mar 2024 06:28  Patient On (Oxygen Delivery Method): room air        03-18 @ 07:01  -  03-19 @ 07:00  --------------------------------------------------------  IN: 650 mL / OUT: 900 mL / NET: -250 mL          PHYSICAL EXAM:  Constitutional: well-appearing, in no apparent respiratory distress  HEENT: NC/AT; PERRL, anicteric sclera; moist mucous membranes  Neck: supple, no JVD or LAD appreciated  Cardiovascular: +S1/S2, Regular rate and rhythm, no murmurs appreciated   Respiratory: Clear breath sounds bilaterally with decrease at right base and rhonchi mid chest. No wheezes, rhonchi or rales   Gastrointestinal: soft, non-tender, non-distended. Normoactive bowel sounds.   Extremities: no edema, clubbing or cyanosis  Vascular: 2+ radial pulses B/L  Neurological: awake and alert; oriented x3    LABS:      CBC Full  -  ( 19 Mar 2024 05:30 )  WBC Count : 35.15 K/uL  RBC Count : 3.36 M/uL  Hemoglobin : 8.4 g/dL  Hematocrit : 27.3 %  Platelet Count - Automated : 468 K/uL  Mean Cell Volume : 81.3 fl  Mean Cell Hemoglobin : 25.0 pg  Mean Cell Hemoglobin Concentration : 30.8 gm/dL  Auto Neutrophil # : x  Auto Lymphocyte # : x  Auto Monocyte # : x  Auto Eosinophil # : x  Auto Basophil # : x  Auto Neutrophil % : x  Auto Lymphocyte % : x  Auto Monocyte % : x  Auto Eosinophil % : x  Auto Basophil % : x    03-19    131<L>  |  95<L>  |  16  ----------------------------<  103<H>  3.6   |  25  |  0.84    Ca    8.9      19 Mar 2024 05:30  Phos  2.8     03-19  Mg     2.0     03-19    TPro  5.3<L>  /  Alb  2.2<L>  /  TBili  0.3  /  DBili  x   /  AST  14  /  ALT  10  /  AlkPhos  147<H>  03-19    PT/INR - ( 18 Mar 2024 07:16 )   PT: 13.6 sec;   INR: 1.20          PTT - ( 18 Mar 2024 07:16 )  PTT:32.5 sec        RADIOLOGY & ADDITIONAL STUDIES:

## 2024-03-19 NOTE — PROGRESS NOTE ADULT - PROBLEM SELECTOR PLAN 1
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 s/p tpa/dornase MIST 2 protocol, discharged to [facility] w/ Abx. Pleural studies c/f complicated parapneumonic effusion. Showing elevated LDH >2500, neutrophil predominance, low glucose c/f persistent infection despite tx.  - pulm recs  - c/w abx:  - CTSx consult for possible VATS/washout, f/u recs Pt with known RLL effusion 2/2 SCC, previously seen last admission 2/2024. s/p tpa/dornase MIST 2 protocol, discharged to [facility] w/ Abx. Pleural studies c/f complicated parapneumonic effusion. Showing elevated LDH >2500, neutrophil predominance, low glucose c/f persistent infection despite tx.  - pulm recs  - c/w empiric abx w/ vanc and zosyn  - CTSx consult for possible VATS/washout, f/u recs

## 2024-03-19 NOTE — PROGRESS NOTE ADULT - ATTENDING COMMENTS
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 Now re-admitted with low grade fever, persistent, loculated right pleural effusion and “wet” cough. WBC persistently elevated. RVP + for hMPV. Pleural fluid studies: Glu<2, LDH > 2500, 97% PMN. These numbers are very similar to what they were on 2/24 when he was thought to have an infected pleural space (no cx available but cytology with “purulent inflammation” in addition to malignant cells).     Problems:     #1 MPE with suspected infected pleural space / empyema   --await thoracic surgery recs; if not a candidate for VATS, will either place a new chest tube or resume TPA/Dornase treatment via PleurX  --continue abx    # 2 hMPV respiratory tract infection   --supportive care    # 3 Untreated cancer, debilitated state, poor prognosis  --recommend involvement by supporive/palliative care team to help establish GOC

## 2024-03-19 NOTE — PROGRESS NOTE ADULT - PROBLEM SELECTOR PLAN 8
I/s/o multiple medical problems including known malignancy, chronic gait instability. Progressive weight loss since lung cancer diagnosis. Previously lived alone but was unable to cook for self i/s/o fatigue and unstable gait. Was precviously discharged with regular diet, supplements and vitamins. PT evaluated and recommended CHENCHO. Palliative follow up was scheduled with Dr Rodriguez   - f/u dysphagia screen   - c/w Regular diet, recommend Ensure Max Protein 2x/day (150 kcal, 30 g protein per serving)   - f/u palliative consult in AM Patient with Hx of BPH on medications outpatient. Retaining on previous admission. Trial of straight cath was unsuccessful therefore urology was called for Ruth placement. Trial of straight cath was unsuccessful therefore urology was called for Ruth placement, and requirment volume rescucitation i/s/o post-obstructive diuresis. Patient with ruth on present on this admission. Pending TOV.  - c/w terazosin + finasteride  - ruth to be replaced, pending TOV

## 2024-03-19 NOTE — PROGRESS NOTE ADULT - PROBLEM SELECTOR PLAN 6
Hgb on presentation 9.8, last hgb 11.7 1/2024. Pt denied hematemesis, hemoptysis, or melena. patient now with slightly pink urine draining from ruth cath. Likely AOCD and nutritional deficiencies i/s/o progressing lung cancer and significant weight loss. Iron studies from previous admission noted. U op from ruth (present at time of admission dark red.   - f/u UA (after catheter exchange)  - maintain active T&S  - f/u urology consult likely reactive in nature, with increase also likely d/t iron deficiency, and likely intravascular volume depletion.   - c/t monitor

## 2024-03-19 NOTE — PROGRESS NOTE ADULT - ASSESSMENT
{\rtf1\bzvqly89659\ansi\kozbufo2765\ftnbj\uc1\deff0  {\fonttbl{\f0 \fnil Segoe UI;}{\f1 \fnil \fcharset0 Segoe UI;}{\f2 \fnil Times New Luis;}}  {\colortbl ;\dax062\ezhvj014\plby679 ;\red0\green0\blue0 ;\red0\green0\yefi300 ;\red0\green0\blue0 ;}  {\stylesheet{\f0\fs20 Normal;}{\cs1 Default Paragraph Font;}{\cs2\f0\fs16 Line Number;}{\cs3\f2\fs24\ul\cf3 Hyperlink;}}  {\*\revtbl{Unknown;}}  \vywhnl90270\fbafcq92180\dkzna2350\eijho6187\oqusy2803\nwjyi5651\pspasqr639\qpedesm940\nogrowautofit\bhqjdo539\formshade\nofeaturethrottle1\dntblnsbdb\fet4\aendnotes\aftnnrlc\pgbrdrhead\pgbrdrfoot  \sectd\czwxgm30157\tqyhry88359\guttersxn0\nzxshfkx6863\kpzarqif1508\necysyzg0215\awljpllz2877\diejwry693\spoeqqa777\sbkpage\pgncont\pgndec  \plain\plain\f0\fs24\ql\plain\f0\fs24\plain\f0\fs20\wsvm8809\hich\f0\dbch\f0\loch\f0\fs20\par  I M\par  \par  88 y o  squamous cell lung cancer, BPH, peripheral neuropathy, and anxiety presenting with progressive dyspnea i/s/o known MPE and IPC. Was recently seen in 02/2024 and was supposed to f/u with pulm, o/p (refused) not presenting with worsening R pleural   effusion. \par  \par  \plain\f1\fs20\gfjp7360\hich\f1\dbch\f1\loch\f1\cf2\fs20\ul{\field{\*\fldinst HYPERLINK [id number] }{\fldrslt Problem/Plan - 1:}}\plain\f0\fs20\glun7858\hich\f0\dbch\f0\loch\f0\fs20\ql\par  \'b7  {\*\bkmkstart [id number]}{\*\bkmkend io74628527823}Problem: {\*\bkmkstart ms02822655825}{\*\bkmkend fj63471137698}Exudative pleural effusion. \par  \'b7  {\*\bkmkstart mb43221352735}{\*\bkmkend am78674837409}Plan: {\*\bkmkstart mi56925674815}{\*\bkmkend un13936390401}Pt with known RLL effusion 2/2 SCC, previously seen last admission 2/2024. s/p tpa/dornase MIST 2 protocol, discharged to [facility] w/ Abx.   Pleural studies c/f complicated parapneumonic effusion. Showing elevated LDH >2500, neutrophil predominance, low glucose c/f persistent infection despite tx.\par  - pulm recs\par  - c/w empiric abx w/ vanc and zosyn\par  - CTSx consult for possible VATS/washout, f/u recs.\plain\f1\fs20\rnru9156\hich\f1\dbch\f1\loch\f1\cf2\fs20\strike\plain\f0\fs20\irxy2161\hich\f0\dbch\f0\loch\f0\fs20\par  \par  \plain\f1\fs20\xzwv2985\hich\f1\dbch\f1\loch\f1\cf2\fs20\ul{\field{\*\fldinst HYPERLINK [id number] }{\fldrslt Problem/Plan - 2:}}\plain\f0\fs20\yoql1485\hich\f0\dbch\f0\loch\f0\fs20\ql\par  \'b7  {\*\bkmkstart cd02174078952}{\*\bkmkend bu92493069869}Problem: {\*\bkmkstart tx21055969832}{\*\bkmkend wk73760345265}Pneumonia. \par  \'b7  {\*\bkmkstart xc75538107154}{\*\bkmkend av66758010798}Plan: {\*\bkmkstart gr49274206444}{\*\bkmkend ct66840804350}Patient meeting SIRS criteria (HR >90 and Leukocytosis). Leukocytosis worsening from previous admission. Likely iso parapneumonic effusion.   Note that per pulm patient was to be discharged on levoquin however medication was too expensive and augmentin was chosen as a treatment. s/p MIST II protocol and Vancomycin (d/c'd 2/25). Patient with worsened productive cough on admission however unable   to mobilize secretions. BCx NGTD. RPV +human metapneumo. MRSA neg, urine legionella and strep negative.\par  - zosyn (pseudomonal coverage) + vancomycin, de-escalate as appropriate\par  - f/u SCx \par  - pulm toilet q6h, aerobika\par  - azithro 500mg IVPB q24h.\par  \par  \plain\f1\fs20\znub2154\hich\f1\dbch\f1\loch\f1\cf2\fs20\ul{\field{\*\fldinst HYPERLINK [id number] }{\fldrslt Problem/Plan - 3:}}\plain\f0\fs20\zhdh8623\hich\f0\dbch\f0\loch\f0\fs20\ql\par  \'b7  {\*\bkmkstart hn46102376812}{\*\bkmkend rj94088026629}Problem: {\*\bkmkstart xi16562718993}{\*\bkmkend sq02302977367}Sepsis. \par  \'b7  {\*\bkmkstart bm02065281580}{\*\bkmkend va78854805207}Plan: {\*\bkmkstart wa08059429442}{\*\bkmkend ae23231156178}see pneumonia problem above.\par  \par  \plain\f1\fs20\wgxz2732\hich\f1\dbch\f1\loch\f1\cf2\fs20\ul{\field{\*\fldinst HYPERLINK [id number] }{\fldrslt Problem/Plan - 4:}}\plain\f0\fs20\qzmu5581\hich\f0\dbch\f0\loch\f0\fs20\ql\par  \'b7  {\*\bkmkstart ql58256153859}{\*\bkmkend gh02184337364}Problem: {\*\bkmkstart xo94970251960}{\*\bkmkend su68861662314}Squamous cell lung cancer. \par  \'b7  {\*\bkmkstart qv31034705167}{\*\bkmkend qq60708855423}Plan: {\*\bkmkstart ky07595729141}{\*\bkmkend sz87736245193}seen by pulm on previous admissin for MPE s/p IPC placement by Dr. Lawrence. bedside US on previous admission in 02/2024, right sided   small pleural effusion with visible septations. Per Dr. Lawrence who placed pleurx, effusion was septated when pleurx was initially placed. cell count shows 95% neutrophils. Glucose 14 and LDH >2500, concerning for complicated parapneumonic effusion.   Pt now s/p MIST2 protocol. CTAP at that admission showed subcarinal and right hilar LAD and b/l pleural effusion with right sided hydropneumothorax. Pleural fluid cyto POSITIVE FOR MALIGNANT CELLS. Keratinizing  squamous cell carcinoma, predominantly   dispersed cells, in background of purulent inflammation. Patient was supposed to have follwed with pulm as outpatient however refused to attend f/u appt. Subsequently required EOD drainage of pleurx while at [facility]. Pulm consulted in ED. \par  - f/u pulm recs\par  - palliative consulted.\par  \par  \plain\f1\fs20\vblc6605\hich\f1\dbch\f1\loch\f1\cf2\fs20\ul{\field{\*\fldinst HYPERLINK [id number] }{\fldrslt Problem/Plan - 5:}}\plain\f0\fs20\nvqc0137\hich\f0\dbch\f0\loch\f0\fs20\ql\par  \'b7  {\*\bkmkstart mx31799716792}{\*\bkmkend fl56727797711}Problem: {\*\bkmkstart xf29504382370}{\*\bkmkend jt96995626102}Normocytic anemia. \par  \'b7  {\*\bkmkstart fy82471846211}{\*\bkmkend bz44576315561}Plan: {\*\bkmkstart lt72631735327}{\*\bkmkend bb80989907562}Hgb on presentation 9.8, last hgb 11.7 1/2024. Pt denied hematemesis, hemoptysis, or melena. patient now with slightly pink urine draining   from ruth cath. Likely AOCD and nutritional deficiencies i/s/o progressing lung cancer and significant weight loss. Iron studies from previous admission noted. U op from ruth (present at time of admission dark red. \par  - f/u UA (after catheter exchange)\par  - maintain active T&S.\par  \par  \plain\f1\fs20\lhbh8290\hich\f1\dbch\f1\loch\f1\cf2\fs20\ul{\field{\*\fldinst HYPERLINK [id number] }{\fldrslt Problem/Plan - 6:}}\plain\f0\fs20\rmdy4024\hich\f0\dbch\f0\loch\f0\fs20\ql\par  \'b7  {\*\bkmkstart lc10616530167}{\*\bkmkend lh78093013808}Problem: {\*\bkmkstart yg73492240307}{\*\bkmkend ce76352468313}History of thrombocytosis. \par  \'b7  {\*\bkmkstart qw09901191649}{\*\bkmkend vw93135043445}Plan: {\*\bkmkstart ce15432381414}{\*\bkmkend sr97180914457}likely reactive in nature, with increase also likely d/t iron deficiency, and likely intravascular volume depletion. \par  - c/t monitor.\par  \par  \plain\f1\fs20\imhf0975\hich\f1\dbch\f1\loch\f1\cf2\fs20\ul{\field{\*\fldinst HYPERLINK [id number] }{\fldrslt Problem/Plan - 7:}}\plain\f0\fs20\chtk8610\hich\f0\dbch\f0\loch\f0\fs20\ql\par  \'b7  {\*\bkmkstart pg56151433999}{\*\bkmkend ar18376243287}Problem: {\*\bkmkstart qd78240632999}{\*\bkmkend cn89135869798}FTT (failure to thrive) in adult. \par  \'b7  {\*\bkmkstart el91077493270}{\*\bkmkend ql57327677702}Plan: {\*\bkmkstart mf55691564938}{\*\bkmkend qt92007816488}I/s/o multiple medical problems including known malignancy, chronic gait instability. Progressive weight loss since lung cancer diagnosis.   Previously lived alone but was unable to cook for self i/s/o fatigue and unstable gait. Was precviously discharged with regular diet, supplements and vitamins. PT evaluated and recommended CHENCHO. Palliative follow up was scheduled with Dr Rodriguez \par  - c/w Regular diet, recommend Ensure Max Protein 2x/day (150 kcal, 30 g protein per serving) \par  - f/u palliative consult\par  - delirium precautions.\plain\f1\fs20\uioe0951\hich\f1\dbch\f1\loch\f1\cf2\fs20\strike\plain\f0\fs20\tzmg7806\hich\f0\dbch\f0\loch\f0\fs20\par  \par  \plain\f1\fs20\afun7535\hich\f1\dbch\f1\loch\f1\cf2\fs20\ul{\field{\*\fldinst HYPERLINK [id number] }{\fldrslt Problem/Plan - 8:}}\plain\f0\fs20\wheu6353\hich\f0\dbch\f0\loch\f0\fs20\ql\par  \'b7  {\*\bkmkstart lq93708413905}{\*\bkmkend cx14118261840}Problem: {\*\bkmkstart oe79713914390}{\*\bkmkend sf47644569787}History of urinary retention. \par  \'b7  {\*\bkmkstart mm03060025312}{\*\bkmkend ah48629093627}Plan: {\*\bkmkstart dr48792578170}{\*\bkmkend da90415637208}Patient with Hx of BPH on medications outpatient. Retaining on previous admission. Trial of straight cath was unsuccessful therefore   urology was called for Ruth placement. Trial of straight cath was unsuccessful therefore urology was called for Ruth placement, and requirment volume rescucitation i/s/o post-obstructive diuresis. Patient with ruth on present on this admission. Pending   TOV.\par  - c/w terazosin + finasteride\par  - ruth to be replaced, pending TOV.\par  \par  \plain\f1\fs20\jben5144\hich\f1\dbch\f1\loch\f1\cf2\fs20\ul{\field{\*\fldinst HYPERLINK [id number] }{\fldrslt Problem/Plan - 9:}}\plain\f0\fs20\pikg6614\hich\f0\dbch\f0\loch\f0\fs20\ql\par  \'b7  {\*\bkmkstart ia81425223832}{\*\bkmkend cc67391567939}Problem: {\*\bkmkstart fz41140779575}{\*\bkmkend ri17693397037}History of BPH. \par  \'b7  {\*\bkmkstart xx48481981375}{\*\bkmkend at49644507246}Plan: {\*\bkmkstart cg02528293478}{\*\bkmkend ji81317096438}Last seen urologist prior to COVID, per [facility] was seen in 2017. Takes terazosin 5mg and finasteride 5mg at home. Reports chronic nocturia   and polyuria that has not particularly worsened.\par  - c/w home meds.\par  \par  \plain\f1\fs20\shsg3028\hich\f1\dbch\f1\loch\f1\cf2\fs20\ul{\field{\*\fldinst HYPERLINK [id number] }{\fldrslt Problem/Plan - 10:}}\plain\f0\fs20\mfka0875\hich\f0\dbch\f0\loch\f0\fs20\ql\par  \'b7  {\*\bkmkstart ej54724805892}{\*\bkmkend bp63420914757}Problem: {\*\bkmkstart ik10668975603}{\*\bkmkend ss11987722334}Protein calorie malnutrition. \par  \'b7  {\*\bkmkstart iy11683659185}{\*\bkmkend am88032956165}Plan; {\*\bkmkstart ji41666382064}{\*\bkmkend ad58418932844}- BMI 18 >20 pound weight loss over past 2 weeks.\par  - per nutrition consult from last visit recommended regular diet with Ensure Enlive twice per day.\par  \par  \plain\f1\fs20\vgnw9238\hich\f1\dbch\f1\loch\f1\cf2\fs20\ul{\field{\*\fldinst HYPERLINK [id number] }{\fldrslt Problem/Plan - 11:}}\plain\f0\fs20\ueto1653\hich\f0\dbch\f0\loch\f0\fs20\ql\par  \'b7  {\*\bkmkstart hg211432450381}{\*\bkmkend hg687209091031}Problem: {\*\bkmkstart bq815718312230}{\*\bkmkend wv003498655949}Anxiety. \par  \'b7  {\*\bkmkstart vy954808620372}{\*\bkmkend xh288971893344}Plan: {\*\bkmkstart ki754501073167}{\*\bkmkend hw585292096951}on home KlonoPIN 0.125 mg PO qd PRN for anxiety\par  - c/w home med.\par  \par  \plain\f1\fs20\fffx4400\hich\f1\dbch\f1\loch\f1\cf2\fs20\ul{\field{\*\fldinst HYPERLINK [id number] }{\fldrslt Problem/Plan - 12:}}\plain\f0\fs20\lgfq4931\hich\f0\dbch\f0\loch\f0\fs20\ql\par  \'b7  {\*\bkmkstart fs068456959819}{\*\bkmkend jx871521826937}Problem: {\*\bkmkstart op113955630686}{\*\bkmkend ci743107057801}Need for prophylactic measure. \par  \'b7  {\*\bkmkstart zr979442105471}{\*\bkmkend rl377875041055}Plan: {\*\bkmkstart ee868564068070}{\*\bkmkend yi247473608440}F: None\par  E: K>4 Mg>2\par  N: Regular\par  GI: None\par  DVT: heparin subq\par  Dispo: RMF.\par  \plain\f1\fs16\sjyw1651\hich\f1\dbch\f1\loch\f1\cf2\fs16\par  \par  \plain\f0\fs20\oqdd0093\hich\f0\dbch\f0\loch\f0\fs20\par  }

## 2024-03-19 NOTE — PROGRESS NOTE ADULT - PROBLEM SELECTOR PLAN 10
Last seen urologist prior to COVID, per [facility]IE was seen in 2017. Takes terazosin 5mg and finasteride 5mg at home. Reports chronic nocturia and polyuria that has not particularly worsened.  - c/w home meds - BMI 18 >20 pound weight loss over past 2 weeks.  - per nutrition consult from last visit recommended regular diet with Ensure Enlive twice per day.

## 2024-03-19 NOTE — PHYSICAL THERAPY INITIAL EVALUATION ADULT - BED MOBILITY LIMITATIONS, REHAB EVAL
Kyphotic posture noted sitting at EOB./decreased ability to use legs for bridging/pushing/impaired ability to control trunk for mobility

## 2024-03-19 NOTE — PROGRESS NOTE ADULT - ATTENDING COMMENTS
Patient was seen and examined at bedside on 3/19/2024 at 2 pm. Patient feels his breathing is okay but has a cough. Denies chest pain and SOB. Denies N/V, hematuria. ROS is otherwise negative. Vitals, labwork and pertinent imaging reviewed. Exam - NAD, Alert, , psych - normal affect, pt refused the rest of the exam    Plan:  -Unclear if this is true infection  -CT surgery consulted  -Pulm consulted  -Cultures - NGTD  -Palliative care consulted  -Oncology  -Need clear idea of prognosis/possible treatment options as does not seem that he will be a candidate for system therapy

## 2024-03-19 NOTE — CONSULT NOTE ADULT - SUBJECTIVE AND OBJECTIVE BOX
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 Was recently admitted to [facility] for further evaluation and management of Failure to Thrive. Course was c/b urinary retention needed ruth placement on 2/22. The patient is being followed by pulmonology for the Pleurx drainage, fluid studies were sent and there was concern for infection for which patient was started on Vanc and Zosyn. S/p TPA/Dornase MIST 2 protocol. Was supposed to f/u with pulm, o/p (refused) now presenting with worsening R pleural effusion. Per ED provider patient refused follow up and CHENCHO was able to reach pulm team who recommended increased pleurx drainage to EOD. Despite this change patient has reaccumulated pleural effusion on R (worse than at time of previous discharge). Pulm may require additional pleural fluid studies/thoracentesis in AM. Patient requiring additional O2 supplementation at time of admission.     ED Course:   Vitals T: 100.2F, HR: 98, BP: 109/68, RR: 16, SpO2: 100% on RA      Labs significant for WBC 33.87, hgb 8.7, plt 474, PT/INR/PTT all elevated, Na 134, K 4.1, Cr 0.89 (baseline 0.9), lactate 2,     CXR with worsened R pleural effusion    Of note: most recent CT CAP 02/2024with previously noted approximate 3.5 cm right lower lobe mass now appears as a nonenhancing geographic area of low attenuation/fluid attenuation. A punctate calcification is seen within the lesion, which can be seen on the pretreatment scan. Adjacent focal atelectasis noted surrounding the lesion, likely posttreatment change. New moderate bilateral pleural effusions and right hydropneumothorax with a chest tube tip positioned superiorly and medially, with the tip not in the pneumothorax itself. Multifocal indeterminate right pleural-based nodularity noted. Subcarinal and right hilar lymphadenopathy, however, the lymph nodes are of low attenuation and possible posttreatment change.     EKG: sinus tachycardia with PVCx L axis deviation and RBBB    Interventions: Tylenol 975mg PO x1 in ED    Home meds: acetaminophen 325 mg oral tablet: 2 tab(s) PO q6h PRN, amoxicillin-clavulanate 500 mg-125 mg 500mg PO q8h, finasteride 5 mg PO qd, heparin: 5,000 unit(s) SQ q8h for DVT ppx, KlonoPIN 0.125 mg PO qd for anxiety, Lipitor 10 mg PO qHS, melatonin 3 mg PO qHS PRN for sleep, pantoprazole 40 mg PO qd, terazosin 5 mg PO qHS    (18 Mar 2024 02:14)    PAST MEDICAL & SURGICAL HISTORY:  Lung cancer      History of BPH      S/P rotator cuff repair      H/O carpal tunnel repair        MEDICATIONS  (STANDING):  albuterol/ipratropium for Nebulization 3 milliLiter(s) Nebulizer every 6 hours  atorvastatin 10 milliGRAM(s) Oral at bedtime  azithromycin  IVPB      doxazosin 4 milliGRAM(s) Oral at bedtime  finasteride 5 milliGRAM(s) Oral daily  lactated ringers. 500 milliLiter(s) (250 mL/Hr) IV Continuous <Continuous>  pantoprazole    Tablet 40 milliGRAM(s) Oral before breakfast  piperacillin/tazobactam IVPB.. 4.5 Gram(s) IV Intermittent every 8 hours  sodium chloride 3%  Inhalation 4 milliLiter(s) Inhalation every 6 hours  vancomycin  IVPB 1000 milliGRAM(s) IV Intermittent every 24 hours    MEDICATIONS  (PRN):  acetaminophen     Tablet .. 650 milliGRAM(s) Oral every 6 hours PRN Temp greater or equal to 38C (100.4F), Mild Pain (1 - 3)  clonazePAM Oral Disintegrating Tablet 0.125 milliGRAM(s) Oral daily PRN anxiety  melatonin 3 milliGRAM(s) Oral at bedtime PRN Sleep              FAMILY HISTORY:      CBC Full  -  ( 17 Mar 2024 21:53 )  WBC Count : 33.87 K/uL  RBC Count : 3.49 M/uL  Hemoglobin : 8.7 g/dL  Hematocrit : 28.6 %  Platelet Count - Automated : 474 K/uL  Mean Cell Volume : 81.9 fl  Mean Cell Hemoglobin : 24.9 pg  Mean Cell Hemoglobin Concentration : 30.4 gm/dL  Auto Neutrophil # : 31.50 K/uL  Auto Lymphocyte # : 0.58 K/uL  Auto Monocyte # : 1.49 K/uL  Auto Eosinophil # : 0.00 K/uL  Auto Basophil # : 0.00 K/uL  Auto Neutrophil % : 91.2 %  Auto Lymphocyte % : 1.7 %  Auto Monocyte % : 4.4 %  Auto Eosinophil % : 0.0 %  Auto Basophil % : 0.0 %      03-17    134<L>  |  98  |  18  ----------------------------<  108<H>  4.1   |  28  |  0.89    Ca    9.1      17 Mar 2024 21:53  Mg     2.0     03-17        Urinalysis Basic - ( 17 Mar 2024 21:53 )    Color: x / Appearance: x / SG: x / pH: x  Gluc: 108 mg/dL / Ketone: x  / Bili: x / Urobili: x   Blood: x / Protein: x / Nitrite: x   Leuk Esterase: x / RBC: x / WBC x   Sq Epi: x / Non Sq Epi: x / Bacteria: x        Radiology :     < from: Xray Chest 1 View- PORTABLE-Urgent (Xray Chest 1 View- PORTABLE-Urgent .) (03.17.24 @ 22:24) >  ACC: [id number] EXAM:  XR CHEST PORTABLE URGENT 1V   ORDERED BY: LISE MEJÍA     PROCEDURE DATE:  03/17/2024          INTERPRETATION:  Clinical history/reason for exam: Post thoracentesis.    Frontal chest.    COMPARISON: February 20, 2024.    Findings/  impression: Stable positioning right chest tube No pneumothorax. Right   opacity, increased. Right pleural effusion, decreased. Left basilar   opacity, new. Heart size within normal limits, thoracic aortic   calcification. Unremarkable mediastinum. Stable bony structures.            Review of Systems : per HPI         Vital Signs Last 24 Hrs  T(C): 36.3 (18 Mar 2024 06:33), Max: 37.9 (17 Mar 2024 23:58)  T(F): 97.4 (18 Mar 2024 06:33), Max: 100.2 (17 Mar 2024 23:58)  HR: 109 (18 Mar 2024 06:33) (98 - 109)  BP: 100/61 (18 Mar 2024 06:33) (95/58 - 109/68)  BP(mean): --  RR: 18 (18 Mar 2024 06:33) (16 - 18)  SpO2: 90% (18 Mar 2024 06:33) (90% - 100%)    Parameters below as of 18 Mar 2024 06:33  Patient On (Oxygen Delivery Method): room air            Physical Exam:  cachectic 88 y o man lying comfortably in semi Salgado's position , awake , alert , no acute complaints , feels tired     Head: normocephalic , atraumatic    Eyes: PERRLA , EOMI , no nystagmus , sclera anicteric    ENT / FACE: neg nasal discharge , uvula midline , no oropharyngeal erythema / exudate    Neck: supple , negative JVD , negative carotid bruits , no thyromegaly    Chest: jhon rhonchi, dec bs R base    Cardiovascular: irregular rate and rhythm ,  III/VI systolic murmur    Abdomen: soft , non distended , non tender to palpation in all 4 quadrants ,  normal bowel sounds     Extremities: WWP , neg cyanosis /clubbing / edema , negative     Neurologic Exam:     Alert and oriented  x 3        Motor Exam:        > 3+/5 x 4 extremities        Sensation:         intact to light touch x 4 extremities     DTR:           biceps/brachioradialis: equal                            patella/ankle: equal        Gait:  not tested          PM&R Impression: admitted for worsening of R pleural effusion     - deconditioned     - FTT        Recommendations / Plan:       1) Physical / Occupational therapy focusing on therapeutic exercises , equipment evaluation , bed mobility/transfer out of bed evaluation , progressive ambulation with assistive devices prn .    2) Current disposition plan recommendation:    CHENCHO placement    
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 Was recently admitted to [facility] for further evaluation and management of Failure to Thrive. Course was c/b urinary retention needed ruth placement on 2/22. The patient is being followed by pulmonology for the Pleurx drainage, fluid studies were sent and there was concern for infection for which patient was started on Vanc and Zosyn. S/p TPA/Dornase MIST 2 protocol. Was supposed to f/u with pulm, o/p but did not make it to his visit now presenting with worsening R pleural effusion. Per ED provider patient refused follow up and CHENCHO was able to reach pulm team who recommended increased pleurx drainage to EOD. Despite this change patient has reaccumulated pleural effusion on R (worse than at time of previous discharge). Patient on 2 L via NC incrased from baseline.     In the ED, Tmax 100.2, HR 100s, otherwise saturating well on 2L  Labs significant for WBC 33 up from 29 at discharge last hospitalization. CXR w/ increasing R pleural effusion.  Admitted to medicine, started on vancomycin/zosyn/azithromycin.     Pulmonary is consulted for Right sided pleural effusion and progressive dyspnea    Follows with Dr. Lawrence outpatient for Pulmonary. Pleurx catheter placed in January 2024 and usually drainage 1-3x /week. He has not received chemotherapy or radiation therapy for his malignancy. He had recent infection of the pleural space in Feb 2024 requiring admission, abx, and mist 2 protocol. Cyto at that time +for malignant cells. Currently denying fever, but does report sputum production.      REVIEW OF SYSTEMS:  10 point ROS negative aside from what is mentioned in HPI    PAST MEDICAL & SURGICAL HISTORY:  Lung cancer  History of BPH  S/P rotator cuff repair  H/O carpal tunnel repair        FAMILY HISTORY:      SOCIAL HISTORY:  Smoking Status: [ ] Current, [x ] Former, [ ] Never  Pack Years:    MEDICATIONS:  Pulmonary:  albuterol/ipratropium for Nebulization 3 milliLiter(s) Nebulizer every 6 hours  sodium chloride 3%  Inhalation 4 milliLiter(s) Inhalation every 6 hours    Antimicrobials:  azithromycin  IVPB      piperacillin/tazobactam IVPB.. 4.5 Gram(s) IV Intermittent every 8 hours  vancomycin  IVPB 1000 milliGRAM(s) IV Intermittent every 24 hours    Anticoagulants:    Onc:    GI/:  pantoprazole    Tablet 40 milliGRAM(s) Oral before breakfast    Endocrine:  atorvastatin 10 milliGRAM(s) Oral at bedtime  finasteride 5 milliGRAM(s) Oral daily    Cardiac:  doxazosin 4 milliGRAM(s) Oral at bedtime    Other Medications:  acetaminophen     Tablet .. 650 milliGRAM(s) Oral every 6 hours PRN  clonazePAM Oral Disintegrating Tablet 0.125 milliGRAM(s) Oral daily PRN  melatonin 3 milliGRAM(s) Oral at bedtime PRN      Allergies    No Known Allergies    Intolerances        Vital Signs Last 24 Hrs  T(C): 36.3 (18 Mar 2024 12:13), Max: 37.9 (17 Mar 2024 23:58)  T(F): 97.3 (18 Mar 2024 12:13), Max: 100.2 (17 Mar 2024 23:58)  HR: 100 (18 Mar 2024 12:13) (98 - 109)  BP: 92/56 (18 Mar 2024 12:13) (92/56 - 109/68)  BP(mean): --  RR: 18 (18 Mar 2024 12:13) (16 - 18)  SpO2: 96% (18 Mar 2024 12:13) (90% - 100%)    Parameters below as of 18 Mar 2024 12:13  Patient On (Oxygen Delivery Method): room air        03-17 @ 07:01  -  03-18 @ 07:00  --------------------------------------------------------  IN: 0 mL / OUT: 300 mL / NET: -300 mL            PHYSICAL EXAM:  Constitutional: well-appearing, in no apparent distress  Head: NC/AT  EENT: PERRL, anicteric sclera; oropharynx clear with moist mucous membranes  Neck: supple, ROM intact, no appreciable JVD or LAD  Respiratory: Lungs clear to auscultation bilaterally but reduced breath sounds at the right base. no wheezes, rhonchi or rales  Cardiovascular: +S1/S2 intact, regular rhythm and rate. No murmurs appreciated  Gastrointestinal: soft, non-tender, non distended, bowel sounds normoactive   Extremities: no edema, clubbing or cyanosis  Vascular: 2+ radial pulses B/L  Neurological: awake and alert; oriented with no appreciable focal neuro defecits    LABS:      CBC Full  -  ( 18 Mar 2024 07:16 )  WBC Count : 34.44 K/uL  RBC Count : 3.32 M/uL  Hemoglobin : 8.2 g/dL  Hematocrit : 26.7 %  Platelet Count - Automated : 458 K/uL  Mean Cell Volume : 80.4 fl  Mean Cell Hemoglobin : 24.7 pg  Mean Cell Hemoglobin Concentration : 30.7 gm/dL  Auto Neutrophil # : 30.67 K/uL  Auto Lymphocyte # : 1.76 K/uL  Auto Monocyte # : 1.37 K/uL  Auto Eosinophil # : 0.05 K/uL  Auto Basophil # : 0.08 K/uL  Auto Neutrophil % : 89.1 %  Auto Lymphocyte % : 5.1 %  Auto Monocyte % : 4.0 %  Auto Eosinophil % : 0.1 %  Auto Basophil % : 0.2 %    03-18    133<L>  |  97  |  17  ----------------------------<  117<H>  3.9   |  27  |  0.83    Ca    9.2      18 Mar 2024 07:16  Phos  3.0     03-18  Mg     2.0     03-18    TPro  4.9<L>  /  Alb  2.0<L>  /  TBili  0.4  /  DBili  x   /  AST  10  /  ALT  10  /  AlkPhos  161<H>  03-18    PT/INR - ( 18 Mar 2024 07:16 )   PT: 13.6 sec;   INR: 1.20          PTT - ( 18 Mar 2024 07:16 )  PTT:32.5 sec          RADIOLOGY & ADDITIONAL STUDIES:    CXR  - Right pleural effusion
Surgeon/Attending MD: Vikash    Requesting Physician: Chilo    HISTORY OF PRESENT ILLNESS (Need 4):    88M PMH squamous cell lung cancer with pleural effusions s/p Pleurx placement prior to this hospitalization, BPH, peripheral neuropathy, and anxiety presenting with progressive dyspnea i/s/o known MPE and IPC. Was recently admitted to [facility] for further evaluation and management of Failure to Thrive. Course was c/b urinary retention needed ruth placement on 2/22. The patient is being followed by pulmonology for the Pleurx drainage, fluid studies were sent and there was concern for infection for which patient was started on Vanc and Zosyn. S/p TPA/Dornase MIST 2 protocol. Was supposed to f/u with pulm, o/p (refused) now presenting with worsening R pleural effusion. Per ED provider patient refused follow up and CHENCHO was able to reach pulm team who recommended increased pleurx drainage to EOD. Despite this change patient has reaccumulated pleural effusion on R (worse than at time of previous discharge). Pulm may require additional pleural fluid studies/thoracentesis in AM. Patient requiring additional O2 supplementation at time of admission.     Thoracic surgery consulted for evaluation for VATS with c/f empyema.     PAST MEDICAL & SURGICAL HISTORY:  Lung cancer      History of BPH      S/P rotator cuff repair      H/O carpal tunnel repair          MEDICATIONS  (STANDING):  albuterol/ipratropium for Nebulization 3 milliLiter(s) Nebulizer every 6 hours  atorvastatin 10 milliGRAM(s) Oral at bedtime  azithromycin  IVPB      azithromycin  IVPB 500 milliGRAM(s) IV Intermittent every 24 hours  doxazosin 4 milliGRAM(s) Oral at bedtime  finasteride 5 milliGRAM(s) Oral daily  heparin   Injectable 5000 Unit(s) SubCutaneous every 8 hours  pantoprazole    Tablet 40 milliGRAM(s) Oral before breakfast  piperacillin/tazobactam IVPB.. 4.5 Gram(s) IV Intermittent every 8 hours  sodium chloride 3%  Inhalation 4 milliLiter(s) Inhalation every 6 hours  vancomycin  IVPB 1000 milliGRAM(s) IV Intermittent every 24 hours    MEDICATIONS  (PRN):  acetaminophen     Tablet .. 650 milliGRAM(s) Oral every 6 hours PRN Temp greater or equal to 38C (100.4F), Mild Pain (1 - 3)  clonazePAM Oral Disintegrating Tablet 0.125 milliGRAM(s) Oral daily PRN anxiety  melatonin 3 milliGRAM(s) Oral at bedtime PRN Sleep      Allergies    No Known Allergies    Intolerances          FAMILY HISTORY:      Review of Systems (Need 10):  A/ox1, cannot provide ROS                                                                  Vital Signs Last 24 Hrs  T(C): 36.6 (19 Mar 2024 12:20), Max: 37 (18 Mar 2024 21:20)  T(F): 97.8 (19 Mar 2024 12:20), Max: 98.6 (18 Mar 2024 21:20)  HR: 108 (19 Mar 2024 12:20) (97 - 108)  BP: 93/53 (19 Mar 2024 12:20) (93/53 - 101/53)  BP(mean): 66 (19 Mar 2024 12:20) (66 - 71)  RR: 18 (19 Mar 2024 12:20) (18 - 18)  SpO2: 96% (19 Mar 2024 12:20) (94% - 96%)    Parameters below as of 19 Mar 2024 12:20  Patient On (Oxygen Delivery Method): room air        PHYSICAL EXAM:  General: resting comfortably in bed in NAD  Neurological: AOx3. Motor skills grossly intact  Cardiovascular: Normal S1/S2. Regular rate/rhythm. No murmurs  Respiratory: Lungs CTA bilaterally. No wheezing or rales, diminished breath sounds R base, pleurex catheter in place  Gastrointestinal: +BS in all 4 quadrants. Non-distended. Soft. Non-tender  Extremities: Strength 5/5 b/l upper/lower extremities. Sensation grossly intact upper/lower extremities. No edema. No calf tenderness.  Vascular: Radial 2+bilaterally, DP 2+ b/l  Incision Sites: NA                                                            LABS:                        8.4    35.15 )-----------( 468      ( 19 Mar 2024 05:30 )             27.3     03-19    131<L>  |  95<L>  |  16  ----------------------------<  103<H>  3.6   |  25  |  0.84    Ca    8.9      19 Mar 2024 05:30  Phos  2.8     03-19  Mg     2.0     03-19    TPro  5.3<L>  /  Alb  2.2<L>  /  TBili  0.3  /  DBili  x   /  AST  14  /  ALT  10  /  AlkPhos  147<H>  03-19    PT/INR - ( 18 Mar 2024 07:16 )   PT: 13.6 sec;   INR: 1.20          PTT - ( 18 Mar 2024 07:16 )  PTT:32.5 sec  Urinalysis Basic - ( 19 Mar 2024 05:30 )    Color: x / Appearance: x / SG: x / pH: x  Gluc: 103 mg/dL / Ketone: x  / Bili: x / Urobili: x   Blood: x / Protein: x / Nitrite: x   Leuk Esterase: x / RBC: x / WBC x   Sq Epi: x / Non Sq Epi: x / Bacteria: x              RADIOLOGY & ADDITIONAL STUDIES:  
[facility]  Cm [facility] Palliative [facility] Attending  Contact Info: Call [phone] (HEAL Line) or message on Microsoft Teams    HPI:  88M PMH squamous cell lung cancer with pleural effusions s/p Pleurx placement prior to this hospitalization, BPH, peripheral neuropathy, and anxiety presenting with progressive dyspnea i/s/o known MPE and IPC. Was recently admitted to [facility] for further evaluation and management of Failure to Thrive. Course was c/b urinary retention needed ruth placement on 2/22. The patient is being followed by pulmonology for the Pleurx drainage, fluid studies were sent and there was concern for infection for which patient was started on Vanc and Zosyn. S/p TPA/Dornase MIST 2 protocol. Was supposed to f/u with pulm, o/p (refused) now presenting with worsening R pleural effusion. Per ED provider patient refused follow up and CHENCHO was able to reach pulm team who recommended increased pleurx drainage to EOD. Despite this change patient has reaccumulated pleural effusion on R (worse than at time of previous discharge). Pulm may require additional pleural fluid studies/thoracentesis in AM. Patient requiring additional O2 supplementation at time of admission.     ED Course:   Vitals T: 100.2F, HR: 98, BP: 109/68, RR: 16, SpO2: 100% on RA      Labs significant for WBC 33.87, hgb 8.7, plt 474, PT/INR/PTT all elevated, Na 134, K 4.1, Cr 0.89 (baseline 0.9), lactate 2,     CXR with worsened R pleural effusion    Of note: most recent CT CAP 02/2024with previously noted approximate 3.5 cm right lower lobe mass now appears as a nonenhancing geographic area of low attenuation/fluid attenuation. A punctate calcification is seen within the lesion, which can be seen on the pretreatment scan. Adjacent focal atelectasis noted surrounding the lesion, likely posttreatment change. New moderate bilateral pleural effusions and right hydropneumothorax with a chest tube tip positioned superiorly and medially, with the tip not in the pneumothorax itself. Multifocal indeterminate right pleural-based nodularity noted. Subcarinal and right hilar lymphadenopathy, however, the lymph nodes are of low attenuation and possible posttreatment change.     EKG: sinus tachycardia with PVCx L axis deviation and RBBB    Interventions: Tylenol 975mg PO x1 in ED    Home meds: acetaminophen 325 mg oral tablet: 2 tab(s) PO q6h PRN, amoxicillin-clavulanate 500 mg-125 mg 500mg PO q8h, finasteride 5 mg PO qd, heparin: 5,000 unit(s) SQ q8h for DVT ppx, KlonoPIN 0.125 mg PO qd for anxiety, Lipitor 10 mg PO qHS, melatonin 3 mg PO qHS PRN for sleep, pantoprazole 40 mg PO qd, terazosin 5 mg PO qHS    (18 Mar 2024 02:14)    PERTINENT PM/SXH:   Lung cancer    History of BPH      S/P rotator cuff repair    H/O carpal tunnel repair      FAMILY HISTORY:    ITEMS NOT CHECKED ARE NOT PRESENT    SOCIAL HISTORY:   Significant other/partner:  []  Children:  []   Substance hx:  []   Tobacco hx:  []   Alcohol hx: []   Home Opioid hx:  [] I-Stop Reference No:  - no active Rx's / see chart note  Living Situation: []Home  []Long term care  []Rehab []Other  Christian/Spiritual practice: ; Role of organized Yazidism [ ] important [ ] some [ ] unable to assess  Coping: [ ] well [ ] with difficulty [ ] poor coping [ ] unable to assess  Support system: [ ] strong [ ] adequate [ ] inadequate    ADVANCE DIRECTIVES:    []MOLST  []Living Will  DECISION MAKER(s):  [] Health Care Proxy(s)  [] Surrogate(s)  [] Guardian           Name(s)/Phone Number(s):     BASELINE (I)ADLs (prior to admission):  [place]: []Total  [] Moderate []Dependent    ALLERGIES:  No Known Allergies    MEDICATIONS  (STANDING):  albuterol/ipratropium for Nebulization 3 milliLiter(s) Nebulizer every 6 hours  atorvastatin 10 milliGRAM(s) Oral at bedtime  doxazosin 4 milliGRAM(s) Oral at bedtime  finasteride 5 milliGRAM(s) Oral daily  heparin   Injectable 5000 Unit(s) SubCutaneous every 8 hours  pantoprazole    Tablet 40 milliGRAM(s) Oral before breakfast  piperacillin/tazobactam IVPB.. 4.5 Gram(s) IV Intermittent every 8 hours  sodium chloride 3%  Inhalation 4 milliLiter(s) Inhalation every 6 hours    MEDICATIONS  (PRN):  acetaminophen     Tablet .. 650 milliGRAM(s) Oral every 6 hours PRN Temp greater or equal to 38C (100.4F), Mild Pain (1 - 3)  clonazePAM Oral Disintegrating Tablet 0.125 milliGRAM(s) Oral daily PRN anxiety  melatonin 3 milliGRAM(s) Oral at bedtime PRN Sleep    PRESENT SYMPTOMS: []Unable to obtain due to poor mentation/encephalopathy  Source if other than patient:  []Family   []Team     Pain: [ ] yes [ ] no  QOL impact -   Location -                    Aggravating Factors -  Quality -  Radiation -  Timing -  Severity (0-10 scale) -   Minimal Acceptable Level (0-10 scale) -    PAIN AD Score:  [email or web address] (press ctrl +  left click to view)    Dyspnea:                           [ ]Mild  [ ]Moderate [ ]Severe  Anxiety:                             [ ]Mild [ ]Moderate [ ]Severe  Fatigue:                             [ ]Mild [ ]Moderate [ ]Severe  Nausea:                             [ ]Mild [ ]Moderate [ ]Severe  Loss of Appetite:             [ ]Mild [ ]Moderate [ ]Severe  Constipation:                   [ ]Mild [ ]Moderate [ ]Severe    Other Symptoms:  [ ]All other review of systems negative     Palliative Performance Status Version 2:  %    http://npcrc.org/files/news/palliative_performance_scale_ppsv2.pdf    PHYSICAL EXAM:  GENERAL:  [ ]Alert  [ ]Oriented x   [ ]Lethargic  [ ]Cachexia  [ ]Unarousable  [ ]Verbal  [ ]Non-Verbal  Behavioral:   [ ]Anxiety  [ ]Delirium [ ]Agitation [ ]Cooperative  HEENT:  [ ]Normal   [ ]Dry mouth   [ ]ET Tube/Trach  [ ]Oral lesions  PULMONARY:   [ ]Clear []Tachypnea  []Audible excessive secretions   [ ]Rhonchi        [ ]Right [ ]Left [ ]Bilateral  [ ]Crackles        [ ]Right [ ]Left [ ]Bilateral  [ ]Wheezing     [ ]Right [ ]Left [ ]Bilateral  CARDIOVASCULAR:    [ ]Regular [ ]Irregular [ ]Tachy  [ ]Brandin [ ]Murmur [ ]Other  GASTROINTESTINAL:  [ ]Soft  [ ]Distended   [ ]+BS  [ ]Non tender [ ]Tender  [ ]PEG [ ]OGT/ NGT  Last BM:     GENITOURINARY:  [ ]Normal [ ] Incontinent   [ ]Oliguria/Anuria   [ ]Ruth  MUSCULOSKELETAL:   [ ]Normal   [ ]Weakness  [ ]Bed/Wheelchair bound [ ]Edema  NEUROLOGIC:   [ ]No focal deficits  [ ]Cognitive impairment  [ ]Dysphagia [ ]Dysarthria [ ]Paresis [ ]Encephalopathic   SKIN:   [ ]Normal   [ ]Pressure ulcer(s)  [ ]Rash    CRITICAL CARE:  [ ]Shock Present  [ ]Septic [ ]Cardiogenic [ ]Neurologic [ ]Hypovolemic  [ ]Vasopressors [ ]Inotropes   [ ]Respiratory failure present [ ]Mechanical Ventilation [ ]Non-invasive ventilatory support [ ]High-Flow  [ ]Acute  [ ]Chronic [ ]Hypoxic  [ ]Hypercarbic  [ ]Other organ failure    Vital Signs Last 24 Hrs  T(C): 36.3 (20 Mar 2024 06:35), Max: 36.7 (19 Mar 2024 21:14)  T(F): 97.3 (20 Mar 2024 06:35), Max: 98 (19 Mar 2024 21:14)  HR: 106 (20 Mar 2024 06:35) (92 - 108)  BP: 103/65 (20 Mar 2024 06:35) (93/53 - 108/61)  BP(mean): 66 (19 Mar 2024 12:20) (66 - 66)  RR: 17 (20 Mar 2024 06:35) (17 - 18)  SpO2: 98% (20 Mar 2024 06:35) (94% - 98%)    Parameters below as of 20 Mar 2024 06:35  Patient On (Oxygen Delivery Method): room air     I&O's Summary    19 Mar 2024 07:01  -  20 Mar 2024 07:00  --------------------------------------------------------  IN: 0 mL / OUT: 1600 mL / NET: -1600 mL        LABS:                        8.4    35.15 )-----------( 468      ( 19 Mar 2024 05:30 )             27.3   03-19    131<L>  |  95<L>  |  16  ----------------------------<  103<H>  3.6   |  25  |  0.84    Ca    8.9      19 Mar 2024 05:30  Phos  2.8     03-19  Mg     2.0     03-19    TPro  5.3<L>  /  Alb  2.2<L>  /  TBili  0.3  /  DBili  x   /  AST  14  /  ALT  10  /  AlkPhos  147<H>  03-19    Urinalysis Basic - ( 19 Mar 2024 05:30 )    Color: x / Appearance: x / SG: x / pH: x  Gluc: 103 mg/dL / Ketone: x  / Bili: x / Urobili: x   Blood: x / Protein: x / Nitrite: x   Leuk Esterase: x / RBC: x / WBC x   Sq Epi: x / Non Sq Epi: x / Bacteria: x      RADIOLOGY & ADDITIONAL STUDIES:      PROTEIN CALORIE MALNUTRITION PRESENT: [ ]mild [ ]moderate [ ]severe [ ]underweight [ ]morbid obesity  [ ]PPSV2 < or = to 30% [ ]significant weight loss  [ ]poor nutritional intake [ ]catabolic state [ ]anasarca     Artificial Nutrition [ ]     REFERRALS:  [x]Social Work  [ ]Case management [ ]PT/OT [ ]Chaplaincy  [ ]Hospice  [ ]Patient/Family Support    DISCUSSION OF CASE: Family - to obtain additional history and to provide emotional support; ( ) -

## 2024-03-19 NOTE — PROGRESS NOTE ADULT - SUBJECTIVE AND OBJECTIVE BOX
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 Was recently admitted to [facility] for further evaluation and management of Failure to Thrive. Course was c/b urinary retention needed ruth placement on 2/22. The patient is being followed by pulmonology for the Pleurx drainage, fluid studies were sent and there was concern for infection for which patient was started on Vanc and Zosyn. S/p TPA/Dornase MIST 2 protocol. Was supposed to f/u with pulm, o/p (refused) now presenting with worsening R pleural effusion. Per ED provider patient refused follow up and CHENCHO was able to reach pulm team who recommended increased pleurx drainage to EOD. Despite this change patient has reaccumulated pleural effusion on R (worse than at time of previous discharge). Pulm may require additional pleural fluid studies/thoracentesis in AM. Patient requiring additional O2 supplementation at time of admission.     ED Course:   Vitals T: 100.2F, HR: 98, BP: 109/68, RR: 16, SpO2: 100% on RA      Labs significant for WBC 33.87, hgb 8.7, plt 474, PT/INR/PTT all elevated, Na 134, K 4.1, Cr 0.89 (baseline 0.9), lactate 2,     CXR with worsened R pleural effusion    Of note: most recent CT CAP 02/2024with previously noted approximate 3.5 cm right lower lobe mass now appears as a nonenhancing geographic area of low attenuation/fluid attenuation. A punctate calcification is seen within the lesion, which can be seen on the pretreatment scan. Adjacent focal atelectasis noted surrounding the lesion, likely posttreatment change. New moderate bilateral pleural effusions and right hydropneumothorax with a chest tube tip positioned superiorly and medially, with the tip not in the pneumothorax itself. Multifocal indeterminate right pleural-based nodularity noted. Subcarinal and right hilar lymphadenopathy, however, the lymph nodes are of low attenuation and possible posttreatment change.     EKG: sinus tachycardia with PVCx L axis deviation and RBBB    Interventions: Tylenol 975mg PO x1 in ED    Home meds: acetaminophen 325 mg oral tablet: 2 tab(s) PO q6h PRN, amoxicillin-clavulanate 500 mg-125 mg 500mg PO q8h, finasteride 5 mg PO qd, heparin: 5,000 unit(s) SQ q8h for DVT ppx, KlonoPIN 0.125 mg PO qd for anxiety, Lipitor 10 mg PO qHS, melatonin 3 mg PO qHS PRN for sleep, pantoprazole 40 mg PO qd, terazosin 5 mg PO qHS    (18 Mar 2024 02:14)                            8.4    35.15 )-----------( 468      ( 19 Mar 2024 05:30 )             27.3       03-19    131<L>  |  95<L>  |  16  ----------------------------<  103<H>  3.6   |  25  |  0.84    Ca    8.9      19 Mar 2024 05:30  Phos  2.8     03-19  Mg     2.0     03-19    TPro  5.3<L>  /  Alb  2.2<L>  /  TBili  0.3  /  DBili  x   /  AST  14  /  ALT  10  /  AlkPhos  147<H>  03-19    Vital Signs Last 24 Hrs  T(C): 36.4 (19 Mar 2024 06:28), Max: 37 (18 Mar 2024 21:20)  T(F): 97.5 (19 Mar 2024 06:28), Max: 98.6 (18 Mar 2024 21:20)  HR: 97 (19 Mar 2024 06:28) (97 - 106)  BP: 99/57 (19 Mar 2024 06:28) (99/57 - 101/53)  BP(mean): 71 (19 Mar 2024 06:28) (69 - 71)  RR: 18 (19 Mar 2024 06:28) (18 - 18)  SpO2: 95% (19 Mar 2024 06:28) (94% - 96%)    Parameters below as of 19 Mar 2024 06:28  Patient On (Oxygen Delivery Method): room air        MEDICATIONS  (STANDING):  albuterol/ipratropium for Nebulization 3 milliLiter(s) Nebulizer every 6 hours  atorvastatin 10 milliGRAM(s) Oral at bedtime  azithromycin  IVPB      azithromycin  IVPB 500 milliGRAM(s) IV Intermittent every 24 hours  doxazosin 4 milliGRAM(s) Oral at bedtime  finasteride 5 milliGRAM(s) Oral daily  heparin   Injectable 5000 Unit(s) SubCutaneous every 8 hours  pantoprazole    Tablet 40 milliGRAM(s) Oral before breakfast  piperacillin/tazobactam IVPB.. 4.5 Gram(s) IV Intermittent every 8 hours  sodium chloride 3%  Inhalation 4 milliLiter(s) Inhalation every 6 hours  vancomycin  IVPB 1000 milliGRAM(s) IV Intermittent every 24 hours    MEDICATIONS  (PRN):  acetaminophen     Tablet .. 650 milliGRAM(s) Oral every 6 hours PRN Temp greater or equal to 38C (100.4F), Mild Pain (1 - 3)  clonazePAM Oral Disintegrating Tablet 0.125 milliGRAM(s) Oral daily PRN anxiety  melatonin 3 milliGRAM(s) Oral at bedtime PRN Sleep      T(C): 36.4 (03-19-24 @ 06:28), Max: 37 (03-18-24 @ 21:20)  HR: 97 (03-19-24 @ 06:28) (97 - 106)  BP: 99/57 (03-19-24 @ 06:28) (99/57 - 101/53)  RR: 18 (03-19-24 @ 06:28) (18 - 18)  SpO2: 95% (03-19-24 @ 06:28) (94% - 96%)        Physical Exam:     General: NAD    Head: pupils reactive    Neck: Supple; no JVD    Respiratory: R sided crackles    Cardiovascular: Regular rhythm/rate; S1/S2+, no murmurs, rubs gallops     Gastrointestinal: Soft; NTND; bowel sounds normal and present    Extremities: WWP; no edema/cyanosis    Neurological: A&Ox2, waxing and waning      Initial Functional Status Assessment :       Previous Level of Function:     · Additional Comments	Pt w/ illogical speech at times, PT unclear of PLOF information accuracy. As per patient, resides in elevator apt alone. States owning RW. Experienced 1 fall within past 6 months.    Cognitive Status Examination:   · Orientation	oriented to person, place, time and situation; however confused and forgetful during conversation  · Level of Consciousness	alert  · Follows Commands and Answers Questions	100% of the time  · Personal Safety and Judgment	intact    Range of Motion Exam:   · Active Range of Motion Examination	no Active ROM deficits were identified    Manual Muscle Testing:   · Manual Muscle Testing Results	Grossly 2+/5 throughout BUE shoulder flex/ext, and BLE hip flex and knee flex. Fair plus bilat  strength noted.    Bed Mobility: Rolling/Turning:     · Level of Cotton	minimum assist (75% patients effort)  · Physical Assist/Nonphysical Assist	1 person assist    Bed Mobility: Scooting/Bridging:     · Level of Cotton	minimum assist (75% patients effort)  · Physical Assist/Nonphysical Assist	1 person assist    Bed Mobility: Sit to Supine:     · Level of Cotton	moderate assist (50% patients effort)  · Physical Assist/Nonphysical Assist	1 person assist    Bed Mobility: Supine to Sit:     · Level of Cotton	minimum assist (75% patients effort)  · Physical Assist/Nonphysical Assist	1 person assist    Bed Mobility Analysis:     · Bed Mobility Limitations	decreased ability to use legs for bridging/pushing; impaired ability to control trunk for mobility; Kyphotic posture noted sitting at EOB.  · Impairments Contributing to Impaired Bed Mobility	impaired balance; impaired postural control; decreased strength    Transfer: Sit to Stand:     · Level of Cotton	TBA - dec sitting balance noted sitting at EOB, N/A for OOB transfers at this time.    Balance Skills Assessment:     · Sitting Balance: Static	fair balance  · Sitting Balance: Dynamic	fair balance  · Systems Impairment Contributing to Balance Disturbance	musculoskeletal  · Identified Impairments Contributing to Balance Disturbance	decreased strength; impaired postural control    Sensory Examination:   Sensory Examination:    Grossly Intact:   · Gross Sensory Examination	Grossly intact to light touch sensation throughout BLE and BUE. Denies numbness, tingling, burning or radiating symptoms along BLE and BUE.      Clinical Impressions:   · Criteria for Skilled Therapeutic Interventions	impairments found; functional limitations in following categories; rehab potential; therapy frequency; anticipated discharge recommendation  · Impairments Found (describe specific impairments)	gait, locomotion, and balance; gross motor; muscle strength; posture; ROM; aerobic capacity/endurance  · Functional Limitations in Following Categories (describe specific limitations)	self-care; home management; work; community/leisure            PM&R Impression : as above    Current disposition plan recommendation :    subacute rehab placement

## 2024-03-19 NOTE — PROGRESS NOTE ADULT - PROBLEM SELECTOR PLAN 4
seen by pulm on previous admissin for MPE s/p IPC placement by Dr. Lawrence. bedside US on previous admission in 02/2024, right sided small pleural effusion with visible septations. Per Dr. Lawrence who placed pleurx, effusion was septated when pleurx was initially placed. cell count shows 95% neutrophils. Glucose 14 and LDH >2500, concerning for complicated parapneumonic effusion. Pt now s/p MIST2 protocol. CTAP at that admission showed subcarinal and right hilar LAD and b/l pleural effusion with right sided hydropneumothorax. Pleural fluid cyto POSITIVE FOR MALIGNANT CELLS. Keratinizing  squamous cell carcinoma, predominantly dispersed cells, in background of purulent inflammation. Patient was supposed to have follwed with pulm as outpatient however refused to attend f/u appt. Subsequently required EOD drainage of pleurx while at [facility]. Pulm consulted in ED.   - f/u pulm recs  - f/u pleural fluid studies seen by pulm on previous admissin for MPE s/p IPC placement by Dr. Lawrence. bedside US on previous admission in 02/2024, right sided small pleural effusion with visible septations. Per Dr. Lawrence who placed pleurx, effusion was septated when pleurx was initially placed. cell count shows 95% neutrophils. Glucose 14 and LDH >2500, concerning for complicated parapneumonic effusion. Pt now s/p MIST2 protocol. CTAP at that admission showed subcarinal and right hilar LAD and b/l pleural effusion with right sided hydropneumothorax. Pleural fluid cyto POSITIVE FOR MALIGNANT CELLS. Keratinizing  squamous cell carcinoma, predominantly dispersed cells, in background of purulent inflammation. Patient was supposed to have follwed with pulm as outpatient however refused to attend f/u appt. Subsequently required EOD drainage of pleurx while at [facility]. Pulm consulted in ED.   - f/u pulm recs  - palliative consulted

## 2024-03-19 NOTE — PHYSICAL THERAPY INITIAL EVALUATION ADULT - ADDITIONAL COMMENTS
Pt w/ illogical speech at times, PT unclear of PLOF information accuracy. As per patient, resides in elevator apt alone. States owning RW. Experienced 1 fall within past 6 months.

## 2024-03-19 NOTE — PROGRESS NOTE ADULT - ASSESSMENT
WBC continues to trend up with fluid analysis from pleurx showing elevated LDH >2500, neutrophil predominance, low glucose c/f persistent infection despite long course of abx since february admission and MIST 2 protocol during february admission. Recommend Thoracic surgery evaluation for persistent infected pleural space c/f empyema/complicated pleural effusion for possible VATs/washout. If no VATS planned, we can consider retrying MIST2 protocol.   89 yo M w/ Squamous Cell lung ca (Stage 4)  not on active chemo or radiation, right sided pleural effusion s/p indwelling pleural catheter since jan 2024 who presents w/ worsening dyspnea, WBC 33 up from 29 at discharge, and increasing pleural fluid driange at his subacute rehab. Pulmonary is consulted for concern of increasing R pleural effusion.    WBC 33 on admission now 34  2/28 - pleural fluid cyto +malignant cells  3/18 - pleural fluid analysis: LDH >2500, glucose low, neutrophil predominant 97% cell count w/ 13k wbc, 70k rbc, pH 7.57, total protein 2.2, albumin 1, glucose <2, cholesterol 27. gram stain negative. culture NGTD    # Squamous Cell Lung Ca, stage 4  # Recurrent Right Pleural Effusion s/p IPC 1/2024  - presents w/ worsening dyspnea, increased needs for drainage at subacute rehab of his pleural effusion, and leukocytosis of 33  - fluid drained 200 cc out on 3/18, sent for analysis above, neutrophil predominant w/ high LDH and low glucose. c/f infection in pleural space  PLAN  - c/w broad abx (vancomycin/zosyn)  - f/u pleural fluid cultures and guide abx  - patient also has cough/sputum production and difficulty clearing, could have a bacterial pneumonia so recommend obtaining sputum culture, MRSA nares and aerobika for airway clearance  - WBC continues to trend up with fluid analysis from pleurx showing elevated LDH >2500, neutrophil predominance, low glucose c/f persistent infection despite long course of abx since february admission and MIST 2 protocol during february admission. Recommend Thoracic surgery evaluation for persistent infected pleural space c/f empyema/complicated pleural effusion for possible VATs/washout. If no VATS planned, we can consider retrying MIST2 protocol  - recommend palliative care related discussions as patient has primary malignancy which has not been treated w/ chemo or radiation, now infected pleural space and poor functional status    S/D/E with Dr. Plummer

## 2024-03-19 NOTE — CONSULT NOTE ADULT - ASSESSMENT
Assesment:    88M PMH squamous cell lung cancer with pleural effusions s/p Pleurx placement prior to this hospitalization, BPH, peripheral neuropathy, and anxiety presenting with progressive dyspnea i/s/o known MPE and IPC. Was recently admitted to [facility] for further evaluation and management of Failure to Thrive. Course was c/b urinary retention needed ruth placement on 2/22. The patient is being followed by pulmonology for the Pleurx drainage, fluid studies were sent and there was concern for infection for which patient was started on Vanc and Zosyn. S/p TPA/Dornase MIST 2 protocol. Was supposed to f/u with pulm, o/p (refused) now presenting with worsening R pleural effusion. Per ED provider patient refused follow up and CHENCHO was able to reach pulm team who recommended increased pleurx drainage to EOD. Despite this change patient has reaccumulated pleural effusion on R (worse than at time of previous discharge). Pulm may require additional pleural fluid studies/thoracentesis in AM. Patient requiring additional O2 supplementation at time of admission.     Thoracic surgery consulted for evaluation for VATS with c/f empyema.     ***INCOMPLETE NOTE***      Plan:  Problem 1: Recurrent R sided Pleural effusion      Problem 2: BPH  -c/w home meds terazosin and finasteride  -care per primary team      Problem 3: Malnutrition  -reccs per nutrition      Problem 4:    I have reviewed clinical labs tests and reports, radiology tests and reports, as well as old patient medical records, and discussed with the refering physician.     Assesment:    88M PMH squamous cell lung cancer with pleural effusions s/p Pleurx placement prior to this hospitalization, BPH, peripheral neuropathy, and anxiety presenting with progressive dyspnea i/s/o known MPE and IPC. Was recently admitted to [facility] for further evaluation and management of Failure to Thrive. Course was c/b urinary retention needed ruth placement on 2/22. The patient is being followed by pulmonology for the Pleurx drainage, fluid studies were sent and there was concern for infection for which patient was started on Vanc and Zosyn. S/p TPA/Dornase MIST 2 protocol. Was supposed to f/u with pulm, o/p (refused) now presenting with worsening R pleural effusion. Per ED provider patient refused follow up and CHENCHO was able to reach pulm team who recommended increased pleurx drainage to EOD. Despite this change patient has reaccumulated pleural effusion on R (worse than at time of previous discharge). Pulm may require additional pleural fluid studies/thoracentesis in AM. Patient requiring additional O2 supplementation at time of admission.     Thoracic surgery consulted for evaluation for VATS with c/f empyema.           Plan:  Problem 1: Recurrent R sided Pleural effusion  -d/w Dr Suh  -Please obtain repeat CT chest with IV contrast  -connect pleurex catheter to pleurevac to monitor drainage  -thoracic will continue to follow      Problem 2: BPH  -c/w home meds terazosin and finasteride  -care per primary team      Problem 3: Malnutrition  -reccs per nutrition      Problem 4:    I have reviewed clinical labs tests and reports, radiology tests and reports, as well as old patient medical records, and discussed with the refering physician.

## 2024-03-19 NOTE — CHART NOTE - NSCHARTNOTEFT_GEN_A_CORE
Progress Note:   · Provider Specialty	Palliative Care    Reason for Admission:    Reason for Admission:  · Reason for Admission	Worsening R malignant pleural effusion      · Subjective and Objective:   [facility]  Eddie Ortiz, Palliative Care Attending  Contact Info: Call [phone] (HEAL Line) or message on Microsoft Teams (Eddie Ortiz)    SUBJECTIVE AND OBJECTIVE:  INTERVAL HPI/OVERNIGHT EVENTS: Interval events noted. Feels very anxious but able to participate in interview. Denies pain or shortness of breath. See patient's PRN use for the past 24hrs noted below. Comprehensive symptom assessment and GOC exploration as noted below. Extensive time spent discussing plan of care with patient/family.    ALLERGIES:  No Known Allergies    MEDICATIONS  (STANDING):  acetaminophen     Tablet .. 1000 milliGRAM(s) Oral every 8 hours  albuterol/ipratropium for Nebulization 3 milliLiter(s) Nebulizer every 6 hours  clonazePAM  Tablet 0.5 milliGRAM(s) Oral at bedtime  doxazosin 4 milliGRAM(s) Oral at bedtime  finasteride 5 milliGRAM(s) Oral daily  pantoprazole    Tablet 40 milliGRAM(s) Oral before breakfast  piperacillin/tazobactam IVPB.. 4.5 Gram(s) IV Intermittent every 8 hours  scopolamine 1 mG/72 Hr(s) Patch 1 Patch Transdermal every 72 hours  sodium chloride 3%  Inhalation 4 milliLiter(s) Inhalation every 6 hours    MEDICATIONS  (PRN):  clonazePAM  Tablet 0.5 milliGRAM(s) Oral every 6 hours PRN Anxiety/Agitation  melatonin 3 milliGRAM(s) Oral at bedtime PRN Sleep  morphine  - Injectable 3 milliGRAM(s) IV Push every 4 hours PRN Severe Pain (7 - 10) or SOB/RR>22      Analgesic Use (Scheduled and PRNs) for past 24 hours:  acetaminophen     Tablet ..   650 milliGRAM(s) Oral (03-20-24 @ 12:50)   650 milliGRAM(s) Oral (03-20-24 @ 02:33)   650 milliGRAM(s) Oral (03-19-24 @ 21:40)   650 milliGRAM(s) Oral (03-19-24 @ 16:31)    clonazePAM Oral Disintegrating Tablet   0.5 milliGRAM(s) Oral (03-20-24 @ 14:38)      ITEMS UNCHECKED ARE NOT PRESENT  PRESENT SYMPTOMS/REVIEW OF SYSTEMS: []Unable to obtain due to poor mentation   Source if other than patient:  []Family   []Team     Pain: [x] yes [] no  QOL impact - tolerable  Location - generalized  Aggravating factors -  Quality -  Radiation -  Timing - constant  Severity (0-10 scale) -  Minimal acceptable level (0-10 scale) -    Dyspnea:                           []Mild  []Moderate []Severe  Anxiety:                             []Mild []Moderate [x]Severe  Fatigue:                             []Mild []Moderate []Severe  Nausea:                             []Mild []Moderate []Severe  Loss of appetite:              []Mild []Moderate []Severe  Constipation:                    []Mild []Moderate []Severe    Other Symptoms:  [x]All other review of systems negative     Palliative Performance Status Version 2: 30%    GENERAL:  [x] NAD [x]Alert []Lethargic  []Cachexia  []Unarousable  [x]Verbal  []Non-Verbal  BEHAVIORAL:   [x]Anxiety  []Delirium []Agitation []Cooperative [x]Oriented x3  HEENT:  [x]Normal  [x] Moist Mucous Membranes []Dry mouth   []ET Tube/Trach  []Oral lesions  PULMONARY:   []Clear []Tachypnea  []Audible excessive secretions  [x]Normal Work of Breathing []Labored Breathing  []Rhonchi [x]Crackles []Wheezing  CARDIOVASCULAR:    [x]Regular Rate [x]Regular Rhythm []Irregular []Tachy  []Brandin  GASTROINTESTINAL:  [x]Soft  []Distended   [x]+BS  [x]Non tender []Tender  []PEG []OGT/ NGT  Last BM:  GENITOURINARY:  []Normal [] Incontinent   []Oliguria/Anuria   [x]Gerber  MUSCULOSKELETAL:   []Normal Extremities  [x]Weakness  []Bed/Wheelchair bound []Edema  NEUROLOGIC:   [x]No focal deficits  []Cognitive impairment  []Dysphagia []Dysarthria []Paresis []Encephalopathic  SKIN:   [x]Normal   []Pressure ulcer(s)  []Rash    Vital Signs Last 24 Hrs  T(C): 36.4 (19 Mar 2024 06:28), Max: 37 (18 Mar 2024 21:20)  T(F): 97.5 (19 Mar 2024 06:28), Max: 98.6 (18 Mar 2024 21:20)  HR: 97 (19 Mar 2024 06:28) (97 - 106)  BP: 99/57 (19 Mar 2024 06:28) (92/56 - 101/53)  BP(mean): 71 (19 Mar 2024 06:28) (69 - 71)  RR: 18 (19 Mar 2024 06:28) (18 - 18)  SpO2: 95% (19 Mar 2024 06:28) (94% - 96%)    Parameters below as of 19 Mar 2024 06:28  Patient On (Oxygen Delivery Method): room air    LABS: Personally reviewed and interpreted                     8.4    35.15 )-----------( 468      ( 19 Mar 2024 05:30 )             27.3     03-19    131<L>  |  95<L>  |  16  ----------------------------<  103<H>  3.6   |  25  |  0.84    Ca    8.9      19 Mar 2024 05:30  Phos  2.8     03-19  Mg     2.0     03-19      RADIOLOGY & ADDITIONAL STUDIES: Personally reviewed and interpreted  None new    DISCUSSION OF CASE: Sisters - to provide updates and emotional support; Primary Team/RN - to discuss plan of care      \    Assessment and Plan:   · Assessment	  87yo M with PMH of Metastatic Lung CA, BPH, and Anxiety p/w dyspnea and found to have progression of disease. Palliative consulted for complex medical decision making and symptom management in the setting of life-limiting illness.    \     Problem/Plan - 1:  ·  Problem: Anxiety.   ·  Plan: .  -Clonazepam 0.5mg PO qhs  -Clonazepam 0.5mg q6h PRN for Anxiety.     Problem/Plan - 2:  ·  Problem: Shortness of breath.   ·  Plan: .  -Morphine 3mg IV q4h PRN for Severe Pain or Dyspnea/RR>22  -Scopolamine Patch q72hr.     Problem/Plan - 3:  ·  Problem: Neoplasm related pain.   ·  Plan: .  -Tylenol 1000mg PO q8h ATC  -Morphine 3mg IV q4h PRN for Severe Pain or Dyspnea/RR>22.     Problem/Plan - 4:  ·  Problem: Debility.   ·  Plan: .  PPSV = 40%, requires total assistance for all ADLs  -c/w supportive care, turning and positioning.     Problem/Plan - 5:  ·  Problem: Squamous cell lung cancer.   ·  Plan: .  Metastatic disease, progressed through previous treatment  -not a candidate for further DMT  -hospice eligible and appropriate.     Problem/Plan - 6:  ·  Problem: Advanced care planning/counseling discussion.   ·  Plan: .  Patient is DNR/DNI, MOLST in chart  -sister is HCP  -see GOC note re: hospice referral    In addition to the E/M visit, an advance care planning meeting was performed. Start time: 2:00PM; End time: 2:20PM; Total time: 20min. A face to face meeting to discuss advance care planning was held today regarding: TORRI MARTINEZ. Primary decision maker: Patient is able to participate in decision making; Alternate/surrogate: Sister. Discussed advance directives including, but not limited to, healthcare proxy and code status. Decision regarding code status: DNR/DNI; Documentation completed today: MOLST Hospice Referral GOC note.     Problem/Plan - 7:  ·  Problem: Encounter for palliative care.   ·  Plan: .  Complex medical decision making / symptom management in the setting of metastatic malignancy.    Will continue to follow for ongoing GOC discussion / titration of palliative regimen. Emotional support provided, questions answered.  Active Psychosocial Referrals:  [x]Social Work/Case management [x]PT/OT [x]Chaplaincy [x]Hospice  []Patient/Family Support []Holistic RN [x]Massage Therapy [x]Music Therapy []Ethics  Coping: [] well [x] with difficulty [] poor coping [] unable to assess  Support system: [] strong [x] adequate [] inadequate    For new or uncontrolled symptoms, please call Palliative Care at [phone] ([phone]). The service is available 24/7 (including nights & weekends) to provide symptom management recommendations over the phone as appropriate.

## 2024-03-20 DIAGNOSIS — Z51.5 ENCOUNTER FOR PALLIATIVE CARE: ICD-10-CM

## 2024-03-20 DIAGNOSIS — R06.02 SHORTNESS OF BREATH: ICD-10-CM

## 2024-03-20 LAB
ANION GAP SERPL CALC-SCNC: 8 MMOL/L — SIGNIFICANT CHANGE UP (ref 5–17)
BASOPHILS # BLD AUTO: 0.07 K/UL — SIGNIFICANT CHANGE UP (ref 0–0.2)
BASOPHILS NFR BLD AUTO: 0.2 % — SIGNIFICANT CHANGE UP (ref 0–2)
BUN SERPL-MCNC: 14 MG/DL — SIGNIFICANT CHANGE UP (ref 7–23)
CALCIUM SERPL-MCNC: 8.8 MG/DL — SIGNIFICANT CHANGE UP (ref 8.4–10.5)
CHLORIDE SERPL-SCNC: 98 MMOL/L — SIGNIFICANT CHANGE UP (ref 96–108)
CO2 SERPL-SCNC: 27 MMOL/L — SIGNIFICANT CHANGE UP (ref 22–31)
CREAT SERPL-MCNC: 0.74 MG/DL — SIGNIFICANT CHANGE UP (ref 0.5–1.3)
EGFR: 87 ML/MIN/1.73M2 — SIGNIFICANT CHANGE UP
EOSINOPHIL # BLD AUTO: 0.06 K/UL — SIGNIFICANT CHANGE UP (ref 0–0.5)
EOSINOPHIL NFR BLD AUTO: 0.2 % — SIGNIFICANT CHANGE UP (ref 0–6)
GLUCOSE SERPL-MCNC: 113 MG/DL — HIGH (ref 70–99)
HCT VFR BLD CALC: 26.5 % — LOW (ref 39–50)
HGB BLD-MCNC: 8 G/DL — LOW (ref 13–17)
IMM GRANULOCYTES NFR BLD AUTO: 1.5 % — HIGH (ref 0–0.9)
LYMPHOCYTES # BLD AUTO: 2.12 K/UL — SIGNIFICANT CHANGE UP (ref 1–3.3)
LYMPHOCYTES # BLD AUTO: 6.1 % — LOW (ref 13–44)
MAGNESIUM SERPL-MCNC: 2 MG/DL — SIGNIFICANT CHANGE UP (ref 1.6–2.6)
MCHC RBC-ENTMCNC: 24.7 PG — LOW (ref 27–34)
MCHC RBC-ENTMCNC: 30.2 GM/DL — LOW (ref 32–36)
MCV RBC AUTO: 81.8 FL — SIGNIFICANT CHANGE UP (ref 80–100)
MONOCYTES # BLD AUTO: 1.32 K/UL — HIGH (ref 0–0.9)
MONOCYTES NFR BLD AUTO: 3.8 % — SIGNIFICANT CHANGE UP (ref 2–14)
NEUTROPHILS # BLD AUTO: 30.55 K/UL — HIGH (ref 1.8–7.4)
NEUTROPHILS NFR BLD AUTO: 88.2 % — HIGH (ref 43–77)
NRBC # BLD: 0 /100 WBCS — SIGNIFICANT CHANGE UP (ref 0–0)
PHOSPHATE SERPL-MCNC: 2.5 MG/DL — SIGNIFICANT CHANGE UP (ref 2.5–4.5)
PLATELET # BLD AUTO: 404 K/UL — HIGH (ref 150–400)
POTASSIUM SERPL-MCNC: 3.4 MMOL/L — LOW (ref 3.5–5.3)
POTASSIUM SERPL-SCNC: 3.4 MMOL/L — LOW (ref 3.5–5.3)
RBC # BLD: 3.24 M/UL — LOW (ref 4.2–5.8)
RBC # FLD: 18.7 % — HIGH (ref 10.3–14.5)
SODIUM SERPL-SCNC: 133 MMOL/L — LOW (ref 135–145)
WBC # BLD: 34.63 K/UL — HIGH (ref 3.8–10.5)
WBC # FLD AUTO: 34.63 K/UL — HIGH (ref 3.8–10.5)

## 2024-03-20 PROCEDURE — 99233 SBSQ HOSP IP/OBS HIGH 50: CPT | Mod: GC

## 2024-03-20 PROCEDURE — 99233 SBSQ HOSP IP/OBS HIGH 50: CPT

## 2024-03-20 PROCEDURE — 99497 ADVNCD CARE PLAN 30 MIN: CPT | Mod: 25

## 2024-03-20 RX ORDER — SCOPALAMINE 1 MG/3D
1 PATCH, EXTENDED RELEASE TRANSDERMAL
Refills: 0 | Status: DISCONTINUED | OUTPATIENT
Start: 2024-03-20 | End: 2024-03-26

## 2024-03-20 RX ORDER — CLONAZEPAM 1 MG
0.5 TABLET ORAL EVERY 24 HOURS
Refills: 0 | Status: DISCONTINUED | OUTPATIENT
Start: 2024-03-20 | End: 2024-03-20

## 2024-03-20 RX ORDER — CLONAZEPAM 1 MG
0.5 TABLET ORAL EVERY 6 HOURS
Refills: 0 | Status: DISCONTINUED | OUTPATIENT
Start: 2024-03-20 | End: 2024-03-26

## 2024-03-20 RX ORDER — ACETAMINOPHEN 500 MG
1000 TABLET ORAL EVERY 8 HOURS
Refills: 0 | Status: DISCONTINUED | OUTPATIENT
Start: 2024-03-20 | End: 2024-03-26

## 2024-03-20 RX ORDER — MORPHINE SULFATE 50 MG/1
3 CAPSULE, EXTENDED RELEASE ORAL EVERY 4 HOURS
Refills: 0 | Status: DISCONTINUED | OUTPATIENT
Start: 2024-03-20 | End: 2024-03-23

## 2024-03-20 RX ORDER — CLONAZEPAM 1 MG
0.5 TABLET ORAL AT BEDTIME
Refills: 0 | Status: DISCONTINUED | OUTPATIENT
Start: 2024-03-20 | End: 2024-03-26

## 2024-03-20 RX ADMIN — HEPARIN SODIUM 5000 UNIT(S): 5000 INJECTION INTRAVENOUS; SUBCUTANEOUS at 05:57

## 2024-03-20 RX ADMIN — Medication 4 MILLIGRAM(S): at 21:37

## 2024-03-20 RX ADMIN — SCOPALAMINE 1 PATCH: 1 PATCH, EXTENDED RELEASE TRANSDERMAL at 18:21

## 2024-03-20 RX ADMIN — FINASTERIDE 5 MILLIGRAM(S): 5 TABLET, FILM COATED ORAL at 12:51

## 2024-03-20 RX ADMIN — PANTOPRAZOLE SODIUM 40 MILLIGRAM(S): 20 TABLET, DELAYED RELEASE ORAL at 06:54

## 2024-03-20 RX ADMIN — Medication 1000 MILLIGRAM(S): at 18:21

## 2024-03-20 RX ADMIN — Medication 0.5 MILLIGRAM(S): at 21:38

## 2024-03-20 RX ADMIN — SODIUM CHLORIDE 4 MILLILITER(S): 9 INJECTION INTRAMUSCULAR; INTRAVENOUS; SUBCUTANEOUS at 12:51

## 2024-03-20 RX ADMIN — SCOPALAMINE 1 PATCH: 1 PATCH, EXTENDED RELEASE TRANSDERMAL at 17:29

## 2024-03-20 RX ADMIN — Medication 3 MILLILITER(S): at 17:29

## 2024-03-20 RX ADMIN — SODIUM CHLORIDE 4 MILLILITER(S): 9 INJECTION INTRAMUSCULAR; INTRAVENOUS; SUBCUTANEOUS at 05:57

## 2024-03-20 RX ADMIN — Medication 3 MILLILITER(S): at 05:57

## 2024-03-20 RX ADMIN — Medication 3 MILLILITER(S): at 22:16

## 2024-03-20 RX ADMIN — PIPERACILLIN AND TAZOBACTAM 25 GRAM(S): 4; .5 INJECTION, POWDER, LYOPHILIZED, FOR SOLUTION INTRAVENOUS at 05:57

## 2024-03-20 RX ADMIN — Medication 650 MILLIGRAM(S): at 03:33

## 2024-03-20 RX ADMIN — Medication 650 MILLIGRAM(S): at 12:50

## 2024-03-20 RX ADMIN — Medication 1000 MILLIGRAM(S): at 17:29

## 2024-03-20 RX ADMIN — Medication 0.5 MILLIGRAM(S): at 14:38

## 2024-03-20 RX ADMIN — Medication 3 MILLILITER(S): at 12:51

## 2024-03-20 RX ADMIN — Medication 650 MILLIGRAM(S): at 13:11

## 2024-03-20 RX ADMIN — Medication 650 MILLIGRAM(S): at 02:33

## 2024-03-20 NOTE — PROGRESS NOTE ADULT - PROBLEM SELECTOR PROBLEM 1
"Chief Complaint  Leg Pain (L knee. Slipped on black ice last week, hurt his knee and back. Hit back of head)    Subjective        Alex Nieves presents to [facility]  Leg Pain       Pt presents today with son.    He has been having chronic left knee pain.  No hx of injury to the knee.  He is using a cream for it.    DM- BS are below 140.    HLD- on med and stable.    Objective   Vital Signs:  /70 (BP Location: Left arm, Patient Position: Sitting, Cuff Size: Large Adult)   Pulse 68   Temp 98.6 °F (37 °C) (Tympanic)   Resp 18   Ht 182.9 cm (72.01\")   Wt 116 kg (256 lb 11.2 oz)   SpO2 98%   BMI 34.81 kg/m²   Estimated body mass index is 34.81 kg/m² as calculated from the following:    Height as of this encounter: 182.9 cm (72.01\").    Weight as of this encounter: 116 kg (256 lb 11.2 oz).               Physical Exam  Vitals and nursing note reviewed.   Constitutional:       Appearance: Normal appearance. He is well-developed.   Cardiovascular:      Rate and Rhythm: Normal rate and regular rhythm.      Heart sounds: Normal heart sounds. No murmur heard.  Pulmonary:      Effort: Pulmonary effort is normal. No respiratory distress.      Breath sounds: Normal breath sounds. No stridor. No wheezing or rhonchi.   Musculoskeletal:      Comments: Left knee FROM with no swelling  No crepitations.  Tttp over superior part of patella.     Neurological:      General: No focal deficit present.      Mental Status: He is alert and oriented to person, place, and time. He is not disoriented.   Psychiatric:         Mood and Affect: Mood normal.         Behavior: Behavior normal.        Result Review :                     Assessment and Plan     Diagnoses and all orders for this visit:    1. Chronic pain of left knee (Primary)  -     meloxicam (Mobic) 15 MG tablet; Take 1 tablet by mouth Daily.  Dispense: 90 tablet; Refill: 1    2. Type 2 diabetes mellitus without complication, without " long-term current use of insulin  -     dapagliflozin Propanediol (Farxiga) 10 MG tablet; Take 10 mg by mouth Daily.  Dispense: 90 tablet; Refill: 1    3. Hyperlipemia, mixed             Follow Up     No follow-ups on file.  Patient was given instructions and counseling regarding his condition or for health maintenance advice. Please see specific information pulled into the AVS if appropriate.          Anxiety

## 2024-03-20 NOTE — PROGRESS NOTE ADULT - PROBLEM SELECTOR PLAN 4
seen by pulm on previous admissin for MPE s/p IPC placement by Dr. Lawrence. bedside US on previous admission in 02/2024, right sided small pleural effusion with visible septations. Per Dr. Lawrence who placed pleurx, effusion was septated when pleurx was initially placed. cell count shows 95% neutrophils. Glucose 14 and LDH >2500, concerning for complicated parapneumonic effusion. Pt now s/p MIST2 protocol. CTAP at that admission showed subcarinal and right hilar LAD and b/l pleural effusion with right sided hydropneumothorax. Pleural fluid cyto POSITIVE FOR MALIGNANT CELLS. Keratinizing  squamous cell carcinoma, predominantly dispersed cells, in background of purulent inflammation. Patient was supposed to have follwed with pulm as outpatient however refused to attend f/u appt. Subsequently required EOD drainage of pleurx while at [facility]. Pulm consulted in ED.   - f/u pulm recs  - palliative consulted Likely 2/2 human metapnuemo vs untreated malignancy    see pneumonia problem above Hgb on presentation 9.8, last hgb 11.7 1/2024. Pt denied hematemesis, hemoptysis, or melena. patient now with slightly pink urine draining from ruth cath. Likely AOCD and nutritional deficiencies i/s/o progressing lung cancer and significant weight loss. Iron studies from previous admission noted. U op from ruth (present at time of admission dark red.

## 2024-03-20 NOTE — PROGRESS NOTE ADULT - PROBLEM SELECTOR PLAN 4
.  PPSV = 40%, requires total assistance for all ADLs  -c/w supportive care, turning and positioning

## 2024-03-20 NOTE — CHART NOTE - NSCHARTNOTEFT_GEN_A_CORE
88M PMH squamous cell lung cancer with pleural effusions s/p Pleurx placement prior to this hospitalization, BPH, peripheral neuropathy, and anxiety presenting with progressive dyspnea i/s/o known MPE and IPC. Was recently admitted to [facility] for further evaluation and management of Failure to Thrive. Course was c/b urinary retention needed ruth placement on 2/22. The patient is being followed by pulmonology for the Pleurx drainage, fluid studies were sent and there was concern for infection for which patient was started on Vanc and Zosyn. S/p TPA/Dornase MIST 2 protocol. Was supposed to f/u with pulm, o/p (refused) now presenting with worsening R pleural effusion. Per ED provider patient refused follow up and [facility] was able to reach pulm team who recommended increased pleurx drainage to EOD. Despite this change patient has reaccumulated pleural effusion on R (worse than at time of previous discharge). Pulm may require additional pleural fluid studies/thoracentesis in AM. Patient requiring additional O2 supplementation at time of admission.     Thoracic surgery consulted for evaluation for VATS with c/f empyema.     Plan:  Problem 1: Recurrent R sided Pleural effusion  -d/w Dr Suh  -CT chest with IV contrast was discontinued per primary team. Palliative care following and pt is DNR/DNI.   -Pt will likely be hospice.   -Thoracic surgery will sign off at this time, pt is not a surgical candidate. Please re consult with any further questions or change in pt status.   -Remainder of care per primary team     Problem 2: BPH  -c/w home meds terazosin and finasteride  -care per primary team    Problem 3: Malnutrition  -reccs per nutrition    I have reviewed clinical labs tests and reports, radiology tests and reports, as well as old patient medical records, and discussed with the referring physician.

## 2024-03-20 NOTE — PROGRESS NOTE ADULT - ATTENDING COMMENTS
extensive discussion today with patient and family - mutually agreed to discuss hospice options   palliative team did a family meeting and now patient is comfort care and pending fara   appreciate pall, pulm and trell onc recs

## 2024-03-20 NOTE — PROGRESS NOTE ADULT - SUBJECTIVE AND OBJECTIVE BOX
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 The patient was initially seen with the new diagnosis of NSCLC in 12/2023, but has not received any cancer directed treatment due to significant delays, partially due to his noncompliance with follow up appointments.     ROS:  + SOB, + cough  + weakness  No chest pain  + anorexia  No nausea/vomiting  No fevers/chills/night sweats.   No leg pain or leg swelling.    ROS is otherwise negative.    PMH/PSH:  PAST MEDICAL & SURGICAL HISTORY:  Lung cancer    History of BPH    S/P rotator cuff repair    H/O carpal tunnel repair        Medications:  MEDICATIONS  (STANDING):  albuterol/ipratropium for Nebulization 3 milliLiter(s) Nebulizer every 6 hours  atorvastatin 10 milliGRAM(s) Oral at bedtime  doxazosin 4 milliGRAM(s) Oral at bedtime  finasteride 5 milliGRAM(s) Oral daily  heparin   Injectable 5000 Unit(s) SubCutaneous every 8 hours  pantoprazole    Tablet 40 milliGRAM(s) Oral before breakfast  piperacillin/tazobactam IVPB.. 4.5 Gram(s) IV Intermittent every 8 hours  sodium chloride 3%  Inhalation 4 milliLiter(s) Inhalation every 6 hours    MEDICATIONS  (PRN):  acetaminophen     Tablet .. 650 milliGRAM(s) Oral every 6 hours PRN Temp greater or equal to 38C (100.4F), Mild Pain (1 - 3)  clonazePAM Oral Disintegrating Tablet 0.125 milliGRAM(s) Oral daily PRN anxiety  melatonin 3 milliGRAM(s) Oral at bedtime PRN Sleep    heparin   Injectable 5000 Unit(s) SubCutaneous every 8 hours        No Known Allergies    Allergies    No Known Allergies    Intolerances        Exam:  Vital Signs Last 24 Hrs  T(C): 36.3 (03-20-24 @ 06:35), Max: 36.7 (03-19-24 @ 21:14)  T(F): 97.3 (03-20-24 @ 06:35), Max: 98 (03-19-24 @ 21:14)  HR: 106 (03-20-24 @ 06:35) (92 - 108)  BP: 103/65 (03-20-24 @ 06:35) (93/53 - 108/61)  BP(mean): 66 (03-19-24 @ 12:20) (66 - 66)  RR: 17 (03-20-24 @ 06:35) (17 - 18)  SpO2: 98% (03-20-24 @ 06:35) (94% - 98%)  ill appearing, thin man  HEENT: pale mucosae, anicteric sclerae  No palpable peripheral lymphadenopathy  COR: tachycardic no murmurs, rubs or gallops  PULMO: + coarse rhonchi, decreased BS on R  ABD: soft, no palpable masses, no splenomegaly  EXT: no edema    Labs:  CBC Full  -  ( 20 Mar 2024 07:56 )  WBC Count : 34.63 K/uL  RBC Count : 3.24 M/uL  Hemoglobin : 8.0 g/dL  Hematocrit : 26.5 %  Platelet Count - Automated : 404 K/uL  Mean Cell Volume : 81.8 fl  Mean Cell Hemoglobin : 24.7 pg  Mean Cell Hemoglobin Concentration : 30.2 gm/dL  Auto Neutrophil # : 30.55 K/uL  Auto Lymphocyte # : 2.12 K/uL  Auto Monocyte # : 1.32 K/uL  Auto Eosinophil # : 0.06 K/uL  Auto Basophil # : 0.07 K/uL  Auto Neutrophil % : 88.2 %  Auto Lymphocyte % : 6.1 %  Auto Monocyte % : 3.8 %  Auto Eosinophil % : 0.2 %  Auto Basophil % : 0.2 %        03-20    133<L>  |  98  |  14  ----------------------------<  113<H>  3.4<L>   |  27  |  0.74    Ca    8.8      20 Mar 2024 07:56  Phos  2.5     03-20  Mg     2.0     03-20    TPro  5.3<L>  /  Alb  2.2<L>  /  TBili  0.3  /  DBili  x   /  AST  14  /  ALT  10  /  AlkPhos  147<H>  03-19      Pertinent imaging studies: reviewed    Assessment:    Stage IV NSCLC (squamous cell ca)    Plan:    Significant clinical decline since his last outpatient visit  Now readmitted with worsening pleural effusion  On abx, evaluated by pulmonary service  thoracic surgery consulted for management of effusion/pleurX cath  I recommend palliative care evaluation for hospice  His ECOG PS is very poor, he is not a candidate for systemic therapy    Thank you    Lauren Hubbard MD  [phone]

## 2024-03-20 NOTE — PROGRESS NOTE ADULT - PROBLEM SELECTOR PLAN 7
I/s/o multiple medical problems including known malignancy, chronic gait instability. Progressive weight loss since lung cancer diagnosis. Previously lived alone but was unable to cook for self i/s/o fatigue and unstable gait. Was precviously discharged with regular diet, supplements and vitamins. PT evaluated and recommended [facility]. Palliative follow up was scheduled with Dr Rodriguez   - c/w Regular diet, recommend Ensure Max Protein 2x/day (150 kcal, 30 g protein per serving)   - f/u palliative consult  - delirium precautions Patient with Hx of BPH on medications outpatient. Retaining on previous admission. Trial of straight cath was unsuccessful therefore urology was called for Ruth placement. Trial of straight cath was unsuccessful therefore urology was called for Ruth placement, and requirment volume rescucitation i/s/o post-obstructive diuresis. Patient with ruth on present on this admission. Pending TOV.  - c/w terazosin + finasteride  - failed TOV, ruth replaced 3/19

## 2024-03-20 NOTE — PROGRESS NOTE ADULT - SUBJECTIVE AND OBJECTIVE BOX
O/N Events:    Subjective/ROS: Patient seen and examined at bedside.     Denies Fever/Chills, HA, CP, SOB, n/v, changes in bowel/urinary habits.  12pt ROS otherwise negative.    VITALS  Vital Signs Last 24 Hrs  T(C): 36.3 (20 Mar 2024 06:35), Max: 36.7 (19 Mar 2024 21:14)  T(F): 97.3 (20 Mar 2024 06:35), Max: 98 (19 Mar 2024 21:14)  HR: 106 (20 Mar 2024 06:35) (92 - 108)  BP: 103/65 (20 Mar 2024 06:35) (93/53 - 108/61)  BP(mean): 66 (19 Mar 2024 12:20) (66 - 66)  RR: 17 (20 Mar 2024 06:35) (17 - 18)  SpO2: 98% (20 Mar 2024 06:35) (94% - 98%)    Parameters below as of 20 Mar 2024 06:35  Patient On (Oxygen Delivery Method): room air        CAPILLARY BLOOD GLUCOSE          PHYSICAL EXAM  General: NAD  Head: NC/AT; MMM; PERRL; EOMI;  Neck: Supple; no JVD  Respiratory: CTAB; no wheezes/rales/rhonchi  Cardiovascular: Regular rhythm/rate; S1/S2+, no murmurs, rubs gallops   Gastrointestinal: Soft; NTND; bowel sounds normal and present  Extremities: WWP; no edema/cyanosis  Neurological: A&Ox3, CNII-XII grossly intact; no obvious focal deficits    MEDICATIONS  (STANDING):  albuterol/ipratropium for Nebulization 3 milliLiter(s) Nebulizer every 6 hours  atorvastatin 10 milliGRAM(s) Oral at bedtime  doxazosin 4 milliGRAM(s) Oral at bedtime  finasteride 5 milliGRAM(s) Oral daily  heparin   Injectable 5000 Unit(s) SubCutaneous every 8 hours  pantoprazole    Tablet 40 milliGRAM(s) Oral before breakfast  piperacillin/tazobactam IVPB.. 4.5 Gram(s) IV Intermittent every 8 hours  sodium chloride 3%  Inhalation 4 milliLiter(s) Inhalation every 6 hours    MEDICATIONS  (PRN):  acetaminophen     Tablet .. 650 milliGRAM(s) Oral every 6 hours PRN Temp greater or equal to 38C (100.4F), Mild Pain (1 - 3)  clonazePAM Oral Disintegrating Tablet 0.125 milliGRAM(s) Oral daily PRN anxiety  melatonin 3 milliGRAM(s) Oral at bedtime PRN Sleep      No Known Allergies      LABS                        8.4    35.15 )-----------( 468      ( 19 Mar 2024 05:30 )             27.3     03-19    131<L>  |  95<L>  |  16  ----------------------------<  103<H>  3.6   |  25  |  0.84    Ca    8.9      19 Mar 2024 05:30  Phos  2.8     03-19  Mg     2.0     03-19    TPro  5.3<L>  /  Alb  2.2<L>  /  TBili  0.3  /  DBili  x   /  AST  14  /  ALT  10  /  AlkPhos  147<H>  03-19      Urinalysis Basic - ( 19 Mar 2024 05:30 )    Color: x / Appearance: x / SG: x / pH: x  Gluc: 103 mg/dL / Ketone: x  / Bili: x / Urobili: x   Blood: x / Protein: x / Nitrite: x   Leuk Esterase: x / RBC: x / WBC x   Sq Epi: x / Non Sq Epi: x / Bacteria: x              IMAGING/EKG/ETC   O/N Events: MAYANK    Subjective/ROS: Patient seen and examined at bedside.   Patient had a bowel movement last night.  Reports feeling "very tired, and just needs to rest."       VITALS  Vital Signs Last 24 Hrs  T(C): 36.3 (20 Mar 2024 06:35), Max: 36.7 (19 Mar 2024 21:14)  T(F): 97.3 (20 Mar 2024 06:35), Max: 98 (19 Mar 2024 21:14)  HR: 106 (20 Mar 2024 06:35) (92 - 108)  BP: 103/65 (20 Mar 2024 06:35) (93/53 - 108/61)  BP(mean): 66 (19 Mar 2024 12:20) (66 - 66)  RR: 17 (20 Mar 2024 06:35) (17 - 18)  SpO2: 98% (20 Mar 2024 06:35) (94% - 98%)    Parameters below as of 20 Mar 2024 06:35  Patient On (Oxygen Delivery Method): room air        CAPILLARY BLOOD GLUCOSE          PHYSICAL EXAM  General: NAD  Head: extraocular movements intact, pupils reactive   Neck: Supple; no JVD  Respiratory: R sided crackles in the bases, R side clear in the apex  Cardiovascular: Regular rhythm/rate; S1/S2+, no murmurs, rubs gallops   Gastrointestinal: Soft, non-distended  Extremities: WWP; no edema/cyanosis  Neurological: A&Ox2, waxing and waning     MEDICATIONS  (STANDING):  albuterol/ipratropium for Nebulization 3 milliLiter(s) Nebulizer every 6 hours  atorvastatin 10 milliGRAM(s) Oral at bedtime  doxazosin 4 milliGRAM(s) Oral at bedtime  finasteride 5 milliGRAM(s) Oral daily  heparin   Injectable 5000 Unit(s) SubCutaneous every 8 hours  pantoprazole    Tablet 40 milliGRAM(s) Oral before breakfast  piperacillin/tazobactam IVPB.. 4.5 Gram(s) IV Intermittent every 8 hours  sodium chloride 3%  Inhalation 4 milliLiter(s) Inhalation every 6 hours    MEDICATIONS  (PRN):  acetaminophen     Tablet .. 650 milliGRAM(s) Oral every 6 hours PRN Temp greater or equal to 38C (100.4F), Mild Pain (1 - 3)  clonazePAM Oral Disintegrating Tablet 0.125 milliGRAM(s) Oral daily PRN anxiety  melatonin 3 milliGRAM(s) Oral at bedtime PRN Sleep      No Known Allergies      LABS                        8.4    35.15 )-----------( 468      ( 19 Mar 2024 05:30 )             27.3     03-19    131<L>  |  95<L>  |  16  ----------------------------<  103<H>  3.6   |  25  |  0.84    Ca    8.9      19 Mar 2024 05:30  Phos  2.8     03-19  Mg     2.0     03-19    TPro  5.3<L>  /  Alb  2.2<L>  /  TBili  0.3  /  DBili  x   /  AST  14  /  ALT  10  /  AlkPhos  147<H>  03-19      Urinalysis Basic - ( 19 Mar 2024 05:30 )    Color: x / Appearance: x / SG: x / pH: x  Gluc: 103 mg/dL / Ketone: x  / Bili: x / Urobili: x   Blood: x / Protein: x / Nitrite: x   Leuk Esterase: x / RBC: x / WBC x   Sq Epi: x / Non Sq Epi: x / Bacteria: x              IMAGING/EKG/ETC   O/N Events: MAYANK    Subjective/ROS: Patient seen and examined at bedside. Patient had a bowel movement last night. Reports feeling "very tired, and just needs to rest." At bedside, pt noted to have increased WOB, however satting 93%. Placed on 2L NC    VITALS  Vital Signs Last 24 Hrs  T(C): 36.3 (20 Mar 2024 06:35), Max: 36.7 (19 Mar 2024 21:14)  T(F): 97.3 (20 Mar 2024 06:35), Max: 98 (19 Mar 2024 21:14)  HR: 106 (20 Mar 2024 06:35) (92 - 108)  BP: 103/65 (20 Mar 2024 06:35) (93/53 - 108/61)  BP(mean): 66 (19 Mar 2024 12:20) (66 - 66)  RR: 17 (20 Mar 2024 06:35) (17 - 18)  SpO2: 98% (20 Mar 2024 06:35) (94% - 98%)    Parameters below as of 20 Mar 2024 06:35  Patient On (Oxygen Delivery Method): room air        CAPILLARY BLOOD GLUCOSE          PHYSICAL EXAM  General: NAD  Head: extraocular movements intact, pupils reactive   Neck: Supple; no JVD  Respiratory: R sided crackles in the bases, R side clear in the apex  Cardiovascular: Regular rhythm/rate; S1/S2+, no murmurs, rubs gallops   Gastrointestinal: Soft, non-distended  Extremities: WWP; no edema/cyanosis  Neurological: A&Ox2, waxing and waning     MEDICATIONS  (STANDING):  albuterol/ipratropium for Nebulization 3 milliLiter(s) Nebulizer every 6 hours  atorvastatin 10 milliGRAM(s) Oral at bedtime  doxazosin 4 milliGRAM(s) Oral at bedtime  finasteride 5 milliGRAM(s) Oral daily  heparin   Injectable 5000 Unit(s) SubCutaneous every 8 hours  pantoprazole    Tablet 40 milliGRAM(s) Oral before breakfast  piperacillin/tazobactam IVPB.. 4.5 Gram(s) IV Intermittent every 8 hours  sodium chloride 3%  Inhalation 4 milliLiter(s) Inhalation every 6 hours    MEDICATIONS  (PRN):  acetaminophen     Tablet .. 650 milliGRAM(s) Oral every 6 hours PRN Temp greater or equal to 38C (100.4F), Mild Pain (1 - 3)  clonazePAM Oral Disintegrating Tablet 0.125 milliGRAM(s) Oral daily PRN anxiety  melatonin 3 milliGRAM(s) Oral at bedtime PRN Sleep      No Known Allergies      LABS                        8.4    35.15 )-----------( 468      ( 19 Mar 2024 05:30 )             27.3     03-19    131<L>  |  95<L>  |  16  ----------------------------<  103<H>  3.6   |  25  |  0.84    Ca    8.9      19 Mar 2024 05:30  Phos  2.8     03-19  Mg     2.0     03-19    TPro  5.3<L>  /  Alb  2.2<L>  /  TBili  0.3  /  DBili  x   /  AST  14  /  ALT  10  /  AlkPhos  147<H>  03-19      Urinalysis Basic - ( 19 Mar 2024 05:30 )    Color: x / Appearance: x / SG: x / pH: x  Gluc: 103 mg/dL / Ketone: x  / Bili: x / Urobili: x   Blood: x / Protein: x / Nitrite: x   Leuk Esterase: x / RBC: x / WBC x   Sq Epi: x / Non Sq Epi: x / Bacteria: x              IMAGING/EKG/ETC

## 2024-03-20 NOTE — PROGRESS NOTE ADULT - PROBLEM SELECTOR PLAN 2
Patient meeting SIRS criteria (HR >90 and Leukocytosis). Leukocytosis worsening from previous admission, however probably due to untreated SCC w/ worsening pleural effusion vs human metapneumovirus. Completed augmentin outpatient after last admission. Patient with worsened productive cough on admission however unable to mobilize secretions. BCx NGTD. RVP +human metapneumo. MRSA neg, urine legionella and strep negative.  - c/w zosyn (pseudomonal coverage)   - f/u SCx   - pulm toilet q6h, aerobika  - azithro 500mg IVPB q24h  - f/u CT Chest w/ IV contrast Pt with known RLL effusion 2/2 SCC, previously seen last admission 2/2024. s/p tpa/dornase MIST 2 protocol, discharged to [facility] w/ Abx. Pleural studies c/f complicated parapneumonic effusion. Showing elevated LDH >2500, neutrophil predominance, low glucose c/f persistent infection despite tx.  - pulm recs  - r/u repeat CT Chest w/ IV contrast  - CTSx consult for possible VATS/washout, f/u recs  - pending hospice evaluation with patient and family Pt with known RLL effusion 2/2 SCC, previously seen last admission 2/2024. s/p tpa/dornase MIST 2 protocol, discharged to [facility] w/ Abx. Pleural studies c/f complicated parapneumonic effusion. Showing elevated LDH >2500, neutrophil predominance, low glucose c/f persistent infection despite tx.  - pending hospice   - c/w q6 duonebs

## 2024-03-20 NOTE — PROGRESS NOTE ADULT - SUBJECTIVE AND OBJECTIVE BOX
PULMONARY CONSULT SERVICE FOLLOW-UP NOTE    INTERVAL HPI:  Reviewed chart and overnight events; patient seen and examined. Persistent cough, productive but difficult to fully cough up 1 out of 5 times per patient. denies fever. SOB present but cough most bothersome. Reports not feeling benefits from pleurx drainages.     MEDICATIONS:  Pulmonary:  albuterol/ipratropium for Nebulization 3 milliLiter(s) Nebulizer every 6 hours  sodium chloride 3%  Inhalation 4 milliLiter(s) Inhalation every 6 hours    Antimicrobials:  piperacillin/tazobactam IVPB.. 4.5 Gram(s) IV Intermittent every 8 hours    Anticoagulants:  heparin   Injectable 5000 Unit(s) SubCutaneous every 8 hours    Cardiac:  doxazosin 4 milliGRAM(s) Oral at bedtime      Allergies    No Known Allergies    Intolerances        Vital Signs Last 24 Hrs  T(C): 36.3 (20 Mar 2024 06:35), Max: 36.7 (19 Mar 2024 21:14)  T(F): 97.3 (20 Mar 2024 06:35), Max: 98 (19 Mar 2024 21:14)  HR: 106 (20 Mar 2024 06:35) (92 - 108)  BP: 103/65 (20 Mar 2024 06:35) (93/53 - 108/61)  BP(mean): 66 (19 Mar 2024 12:20) (66 - 66)  RR: 17 (20 Mar 2024 06:35) (17 - 18)  SpO2: 98% (20 Mar 2024 06:35) (94% - 98%)    Parameters below as of 20 Mar 2024 06:35  Patient On (Oxygen Delivery Method): room air        03-19 @ 07:01  -  03-20 @ 07:00  --------------------------------------------------------  IN: 0 mL / OUT: 1600 mL / NET: -1600 mL          PHYSICAL EXAM:  Constitutional: well-appearing, in no apparent respiratory distress  HEENT: NC/AT; PERRL, anicteric sclera; moist mucous membranes  Neck: supple, no JVD or LAD appreciated  Cardiovascular: +S1/S2, Regular rate and rhythm, no murmurs appreciated   Respiratory: Clear breath sounds on left, rhonchi in upper right w/ decreaesd breath sounds righ tbase. No wheezes, rhonchi or rales   Gastrointestinal: soft, non-tender, non-distended. Normoactive bowel sounds.   Extremities: no edema, clubbing or cyanosis  Vascular: 2+ radial pulses B/L  Neurological: awake and alert; oriented     LABS:      CBC Full  -  ( 20 Mar 2024 07:56 )  WBC Count : 34.63 K/uL  RBC Count : 3.24 M/uL  Hemoglobin : 8.0 g/dL  Hematocrit : 26.5 %  Platelet Count - Automated : 404 K/uL  Mean Cell Volume : 81.8 fl  Mean Cell Hemoglobin : 24.7 pg  Mean Cell Hemoglobin Concentration : 30.2 gm/dL  Auto Neutrophil # : 30.55 K/uL  Auto Lymphocyte # : 2.12 K/uL  Auto Monocyte # : 1.32 K/uL  Auto Eosinophil # : 0.06 K/uL  Auto Basophil # : 0.07 K/uL  Auto Neutrophil % : 88.2 %  Auto Lymphocyte % : 6.1 %  Auto Monocyte % : 3.8 %  Auto Eosinophil % : 0.2 %  Auto Basophil % : 0.2 %    03-20    x   |  98  |  14  ----------------------------<  113<H>  x    |  27  |  0.74    Ca    8.8      20 Mar 2024 07:56  Phos  2.5     03-20  Mg     2.0     03-20    TPro  5.3<L>  /  Alb  2.2<L>  /  TBili  0.3  /  DBili  x   /  AST  14  /  ALT  10  /  AlkPhos  147<H>  03-19                    RADIOLOGY & ADDITIONAL STUDIES:    CT chest pending

## 2024-03-20 NOTE — PROGRESS NOTE ADULT - ASSESSMENT
·	s/p family meeting at bedside with patient and his two sisters: goal is symptom-directed care, prioritize comfort  ·	discussed hospice options: Home Hospice in [place] with sister vs [facility]  ·	symptom regimen ordered and adjusted  ·	DNR/DNI, MEWS, CMO     ·	s/p family meeting at bedside with patient and his two sisters: goal is symptom-directed care, prioritize comfort  ·	discussed hospice options, patient and family are amenable: Home Hospice in [place] with sister vs [facility]  ·	symptom regimen ordered and adjusted  ·	DNR/DNI, MEWS, CMO     ·	s/p family meeting at bedside with patient and his two sisters: goal is symptom-directed care, prioritize comfort  ·	discussed hospice options, patient and family are amenable: to be determined whether Home Hospice in [place] with sister vs [facility]  ·	symptom regimen ordered and adjusted  ·	DNR/DNI, MEWS, CMO 89yo M with PMH of Metastatic Lung CA, BPH, and Anxiety p/w dyspnea and found to have progression of disease. Palliative consulted for complex medical decision making and symptom management in the setting of life-limiting illness.    ·	s/p family meeting at bedside with patient and his two sisters: goal is symptom-directed care, prioritize comfort  ·	discussed hospice options, patient and family are amenable: to be determined whether Home Hospice in [place] with sister vs [facility]  ·	symptom regimen ordered and adjusted  ·	DNR/DNI, MEWS, CMO

## 2024-03-20 NOTE — PROGRESS NOTE ADULT - PROBLEM SELECTOR PLAN 8
Patient with Hx of BPH on medications outpatient. Retaining on previous admission. Trial of straight cath was unsuccessful therefore urology was called for Ruth placement. Trial of straight cath was unsuccessful therefore urology was called for Ruth placement, and requirment volume rescucitation i/s/o post-obstructive diuresis. Patient with ruth on present on this admission. Pending TOV.  - c/w terazosin + finasteride  - failed TOV, ruth replaced 3/19 Last seen urologist prior to COVID, per HIE was seen in 2017. Takes terazosin 5mg and finasteride 5mg at home. Reports chronic nocturia and polyuria that has not particularly worsened.  - c/w home meds

## 2024-03-20 NOTE — PROGRESS NOTE ADULT - PROBLEM SELECTOR PLAN 5
Hgb on presentation 9.8, last hgb 11.7 1/2024. Pt denied hematemesis, hemoptysis, or melena. patient now with slightly pink urine draining from ruth cath. Likely AOCD and nutritional deficiencies i/s/o progressing lung cancer and significant weight loss. Iron studies from previous admission noted. U op from ruth (present at time of admission dark red.   - maintain active T&S likely reactive in nature, with increase also likely d/t iron deficiency, and likely intravascular volume depletion.

## 2024-03-20 NOTE — PROGRESS NOTE ADULT - PROBLEM SELECTOR PLAN 10
BMI 18, significant weight loss due to FTT and untreated malignancy. per nutrition consult from last visit recommended regular diet with Ensure Enlive twice per day.  - f/u palliative  - f/u nutrition Comfort care, mews exempt  - klonopin 0.5 at bedtime standing, klonopin 0.5 q6 PRN

## 2024-03-20 NOTE — DIETITIAN INITIAL EVALUATION ADULT - NSFNSPHYEXAMSKINFT_GEN_A_CORE
Pressure Injury 1: Left:, scapula , Stage II  Pressure Injury 2: none, none  Pressure Injury 3: none, none  Pressure Injury 4: none, none  Pressure Injury 5: none, none  Pressure Injury 6: none, none  Pressure Injury 7: none, none  Pressure Injury 8: none, none  Pressure Injury 9: none, none  Pressure Injury 10: none, none  Pressure Injury 11: none, none, Pressure Injury 1: Left:, scapula, Stage II  Pressure Injury 2: none, none  Pressure Injury 3: none, none  Pressure Injury 4: none, none  Pressure Injury 5: none, none  Pressure Injury 6: none, none  Pressure Injury 7: none, none  Pressure Injury 8: none, none  Pressure Injury 9: none, none  Pressure Injury 10: none, none  Pressure Injury 11: none, none

## 2024-03-20 NOTE — PROGRESS NOTE ADULT - PROBLEM SELECTOR PLAN 6
likely reactive in nature, with increase also likely d/t iron deficiency, and likely intravascular volume depletion.   - c/t monitor I/s/o multiple medical problems including known malignancy, chronic gait instability. Progressive weight loss since lung cancer diagnosis. Previously lived alone but was unable to cook for self i/s/o fatigue and unstable gait. Was precviously discharged with regular diet, supplements and vitamins. PT evaluated and recommended CHENCHO. Palliative follow up was scheduled with Dr Rodriguez   - c/w Regular diet, recommend Ensure Max Protein 2x/day (150 kcal, 30 g protein per serving)   - comfort care, mews exempt

## 2024-03-20 NOTE — DIETITIAN INITIAL EVALUATION ADULT - PROBLEM SELECTOR PLAN 3
seen by pulm on previous admissin for MPE s/p IPC placement by Dr. Lawrence. bedside US on previous admission in 02/2024, right sided small pleural effusion with visible septations. Per Dr. Lawrence who placed pleurx, effusion was septated when pleurx was initially placed. cell count shows 95% neutrophils. Glucose 14 and LDH >2500, concerning for complicated parapneumonic effusion. Pt now s/p MIST2 protocol. CTAP at that admission showed subcarinal and right hilar LAD and b/l pleural effusion with right sided hydropneumothorax. Pleural fluid cyto POSITIVE FOR MALIGNANT CELLS. Keratinizing  squamous cell carcinoma, predominantly dispersed cells, in background of purulent inflammation. Patient was supposed to have follwed with pulm as outpatient however refused to attend f/u appt. Subsequently required EOD drainage of pleurx while at [facility]. Pulm consulted in ED.   - f/u with pulm in AM

## 2024-03-20 NOTE — PROGRESS NOTE ADULT - PROBLEM SELECTOR PLAN 9
Last seen urologist prior to COVID, per HIE was seen in 2017. Takes terazosin 5mg and finasteride 5mg at home. Reports chronic nocturia and polyuria that has not particularly worsened.  - c/w home meds BMI 18, significant weight loss due to FTT and untreated malignancy. per nutrition consult from last visit recommended regular diet with Ensure Enlive twice per day.  - regular diet

## 2024-03-20 NOTE — PROGRESS NOTE ADULT - PROBLEM SELECTOR PLAN 6
.  Patient is DNR/DNI, MOLST in chart  -sister is HCP  -see GOC note re: hospice referral .  Patient is DNR/DNI, MOLST in chart  -sister is HCP  -see GOC note re: hospice referral    In addition to the E/M visit, an advance care planning meeting was performed. Start time: 2:00PM; End time: 2:20PM; Total time: 20min. A face to face meeting to discuss advance care planning was held today regarding: TORRI MARTINEZ. Primary decision maker: Patient is able to participate in decision making; Alternate/surrogate: Sister. Discussed advance directives including, but not limited to, healthcare proxy and code status. Decision regarding code status: DNR/DNI; Documentation completed today: MOLST Hospice Referral GOC note

## 2024-03-20 NOTE — DIETITIAN INITIAL EVALUATION ADULT - PERTINENT MEDS FT
MEDICATIONS  (STANDING):  acetaminophen     Tablet .. 1000 milliGRAM(s) Oral every 8 hours  albuterol/ipratropium for Nebulization 3 milliLiter(s) Nebulizer every 6 hours  clonazePAM  Tablet 0.5 milliGRAM(s) Oral at bedtime  doxazosin 4 milliGRAM(s) Oral at bedtime  finasteride 5 milliGRAM(s) Oral daily  pantoprazole    Tablet 40 milliGRAM(s) Oral before breakfast  piperacillin/tazobactam IVPB.. 4.5 Gram(s) IV Intermittent every 8 hours  scopolamine 1 mG/72 Hr(s) Patch 1 Patch Transdermal every 72 hours  sodium chloride 3%  Inhalation 4 milliLiter(s) Inhalation every 6 hours    MEDICATIONS  (PRN):  clonazePAM  Tablet 0.5 milliGRAM(s) Oral every 6 hours PRN Anxiety/Agitation  melatonin 3 milliGRAM(s) Oral at bedtime PRN Sleep  morphine  - Injectable 3 milliGRAM(s) IV Push every 4 hours PRN Severe Pain (7 - 10) or SOB/RR>22

## 2024-03-20 NOTE — PROGRESS NOTE ADULT - PROBLEM SELECTOR PLAN 7
.  Complex medical decision making / symptom management in the setting of metastatic malignancy.    Will continue to follow for ongoing GOC discussion / titration of palliative regimen. Emotional support provided, questions answered.  Active Psychosocial Referrals:  [x]Social Work/Case management [x]PT/OT [x]Chaplaincy [x]Hospice  []Patient/Family Support []Holistic RN [x]Massage Therapy [x]Music Therapy []Ethics  Coping: [] well [x] with difficulty [] poor coping [] unable to assess  Support system: [] strong [x] adequate [] inadequate    For new or uncontrolled symptoms, please call Palliative Care at [phone]). The service is available 24/7 (including nights & weekends) to provide symptom management recommendations over the phone as appropriate

## 2024-03-20 NOTE — PROGRESS NOTE ADULT - PROBLEM SELECTOR PLAN 11
on home KlonoPIN 0.125 mg PO qd PRN for anxiety  - c/w home med F: None  E: K>4 Mg>2  N: Regular  GI: None  DVT: None  Dispo: [facility] --> hospice

## 2024-03-20 NOTE — DIETITIAN INITIAL EVALUATION ADULT - OTHER INFO
88M PMH squamous cell lung cancer, BPH, peripheral neuropathy, and anxiety presenting with progressive dyspnea i/s/o known MPE and IPC. Was recently seen in 02/2024 and was supposed to f/u with pulm, o/p (refused) now presenting with worsening R pleural effusion.     Pt seen for nutrition assessment, however interview limited as pt appeared confused during assessment. Subjective information obtained from EMR. No nausea/vomiting/diarrhea/constipation noted, last recorded BM 3/18. Nonverbal indicators of pain absent - pain regimen ordered. Noted with +2 edema to scrotum and penis, stage II pressure injury to L scapula, Jose Alberto score 15. Per visual assessment, pt noted with moderate to severe muscle wasting to temples, clavicles, and shoulders. Pt unable to provide diet/weight hx PTA, however noted with severe protein calorie malnutrition on previous admit 2/21/24. Pt likely at risk for malnutrition, however RD unable to identify malnutrition according to ASPEN objective criteria at this time. Pt with GOC aligned with comfort measures - order in chart. Pt can be determined at low complexity and follow up by dietitian by consult only. See nutrition recommendations.

## 2024-03-20 NOTE — DIETITIAN INITIAL EVALUATION ADULT - PERTINENT LABORATORY DATA
03-20    133<L>  |  98  |  14  ----------------------------<  113<H>  3.4<L>   |  27  |  0.74    Ca    8.8      20 Mar 2024 07:56  Phos  2.5     03-20  Mg     2.0     03-20    TPro  5.3<L>  /  Alb  2.2<L>  /  TBili  0.3  /  DBili  x   /  AST  14  /  ALT  10  /  AlkPhos  147<H>  03-19

## 2024-03-20 NOTE — DIETITIAN INITIAL EVALUATION ADULT - ADD RECOMMEND
1. Continue diet as ordered.   >>Maintain nutrition within GOC at all times.   >>Do not force feed. Honor all food preferences as able.  2. Monitor GI tolerance, weight trends, labs, & skin integrity as appropriate.  3. Defer bowel and pain regimens to team.   4. RD to remain available for dietary intervention prn.

## 2024-03-20 NOTE — PROGRESS NOTE ADULT - ASSESSMENT
89 yo M w/ Squamous Cell lung ca (Stage 4)  not on active chemo or radiation, right sided pleural effusion s/p indwelling pleural catheter since jan 2024 who presents w/ worsening dyspnea, WBC 33 up from 29 at discharge, and increasing pleural fluid driange at his subacute rehab. Pulmonary is consulted for concern of increasing R pleural effusion.    WBC 34  2/28 - pleural fluid cyto +malignant cells  3/18 - pleural fluid analysis: LDH >2500, glucose low, neutrophil predominant 97% cell count w/ 13k wbc, 70k rbc, pH 7.57, total protein 2.2, albumin 1, glucose <2, cholesterol 27. gram stain negative. culture NGTD    # Squamous Cell Lung Ca, stage 4  # Recurrent Right Pleural Effusion s/p IPC 1/2024  - presents w/ worsening dyspnea, increased needs for drainage at subacute rehab of his pleural effusion, and leukocytosis of 33  - fluid drained 200 cc out on 3/18, sent for analysis above, neutrophil predominant w/ high LDH and low glucose. c/f infection in pleural space  PLAN  - c/w broad abx (vancomycin/zosyn)  - f/u pleural fluid cultures and guide abx  - patient also has cough/sputum production and difficulty clearing, could have a bacterial pneumonia so recommend obtaining sputum culture, MRSA nares and aerobika for airway clearance  - Thoracic surgery consulted, recommend CT Chest (will follow this up) and connecting pleurx to pleurevac (will do this later today). Will consider tpa-dornase MIST2 protocol as well  - recommend palliative care related discussions as patient has primary malignancy which has not been treated w/ chemo or radiation, now infected pleural space and poor functional status    # Humanmetapneumovirus  - supportive care    S/D/E with Dr. Plummer   89 yo M w/ Squamous Cell lung ca (Stage 4)  not on active chemo or radiation, right sided pleural effusion s/p indwelling pleural catheter since jan 2024 who presents w/ worsening dyspnea, WBC 33 up from 29 at discharge, and increasing pleural fluid driange at his subacute rehab. Pulmonary is consulted for concern of increasing R pleural effusion.    WBC 34  2/28 - pleural fluid cyto +malignant cells  3/18 - pleural fluid analysis: LDH >2500, glucose low, neutrophil predominant 97% cell count w/ 13k wbc, 70k rbc, pH 7.57, total protein 2.2, albumin 1, glucose <2, cholesterol 27. gram stain negative. culture NGTD    # Squamous Cell Lung Ca, stage 4  # Recurrent Right Pleural Effusion s/p IPC 1/2024  - presents w/ worsening dyspnea, increased needs for drainage at subacute rehab of his pleural effusion, and leukocytosis of 33  - fluid drained 200 cc out on 3/18, sent for analysis above, neutrophil predominant w/ high LDH and low glucose. c/f infection in pleural space  PLAN  - c/w broad abx (vancomycin/zosyn)  - f/u pleural fluid cultures and guide abx  - patient also has cough/sputum production and difficulty clearing, could have a bacterial pneumonia so recommend obtaining sputum culture, MRSA nares and aerobika for airway clearance  - Thoracic surgery consulted, recommend against VATS as he is poor candidate  - palliative care on board, planning for hospice and comfort focused care  - will recommend PRN pleurx drainages for comfort    # Humanmetapneumovirus  - supportive care    S/D/E with Dr. Plummer

## 2024-03-20 NOTE — PROGRESS NOTE ADULT - PROBLEM SELECTOR PLAN 3
Likely 2/2 human metapnuemo vs untreated malignancy    see pneumonia problem above Patient meeting SIRS criteria (HR >90 and Leukocytosis). Leukocytosis worsening from previous admission, however probably due to untreated SCC w/ worsening pleural effusion vs human metapneumovirus. Completed augmentin outpatient after last admission. Patient with worsened productive cough on admission however unable to mobilize secretions. BCx NGTD. RVP +human metapneumo. MRSA neg, urine legionella and strep negative.  - c/w zosyn (pseudomonal coverage)   - f/u SCx   - pulm toilet q6h, aerobika  - azithro 500mg IVPB q24h  - f/u CT Chest w/ IV contrast Meeting 3/4 SIRS criteria on admission +source. Likely 2/2 human metapnuemo vs untreated malignancy  - comfort care

## 2024-03-20 NOTE — PROGRESS NOTE ADULT - SUBJECTIVE AND OBJECTIVE BOX
[facility]  Eddie Ortiz, Palliative Care Attending  Contact Info: Call [phone] (HEAL Line) or message on Microsoft Teams (Eddie Ortiz)    SUBJECTIVE AND OBJECTIVE:  INTERVAL HPI/OVERNIGHT EVENTS: Interval events noted. See patient's PRN use for the past 24hrs noted below. Comprehensive symptom assessment and GOC exploration as noted below. Extensive time spent discussing plan of care with patient/family.    ALLERGIES:  No Known Allergies    MEDICATIONS  (STANDING):  acetaminophen     Tablet .. 1000 milliGRAM(s) Oral every 8 hours  albuterol/ipratropium for Nebulization 3 milliLiter(s) Nebulizer every 6 hours  clonazePAM  Tablet 0.5 milliGRAM(s) Oral at bedtime  doxazosin 4 milliGRAM(s) Oral at bedtime  finasteride 5 milliGRAM(s) Oral daily  pantoprazole    Tablet 40 milliGRAM(s) Oral before breakfast  piperacillin/tazobactam IVPB.. 4.5 Gram(s) IV Intermittent every 8 hours  scopolamine 1 mG/72 Hr(s) Patch 1 Patch Transdermal every 72 hours  sodium chloride 3%  Inhalation 4 milliLiter(s) Inhalation every 6 hours    MEDICATIONS  (PRN):  clonazePAM  Tablet 0.5 milliGRAM(s) Oral every 6 hours PRN Anxiety/Agitation  melatonin 3 milliGRAM(s) Oral at bedtime PRN Sleep  morphine  - Injectable 3 milliGRAM(s) IV Push every 4 hours PRN Severe Pain (7 - 10) or SOB/RR>22      Analgesic Use (Scheduled and PRNs) for past 24 hours:  acetaminophen     Tablet ..   650 milliGRAM(s) Oral (03-20-24 @ 12:50)   650 milliGRAM(s) Oral (03-20-24 @ 02:33)   650 milliGRAM(s) Oral (03-19-24 @ 21:40)   650 milliGRAM(s) Oral (03-19-24 @ 16:31)    clonazePAM Oral Disintegrating Tablet   0.5 milliGRAM(s) Oral (03-20-24 @ 14:38)      ITEMS UNCHECKED ARE NOT PRESENT  PRESENT SYMPTOMS/REVIEW OF SYSTEMS: []Unable to obtain due to poor mentation   Source if other than patient:  []Family   []Team         Vital Signs Last 24 Hrs  T(C): 36.3 (20 Mar 2024 12:38), Max: 36.7 (19 Mar 2024 21:14)  T(F): 97.4 (20 Mar 2024 12:38), Max: 98 (19 Mar 2024 21:14)  HR: 110 (20 Mar 2024 12:38) (92 - 110)  BP: 104/62 (20 Mar 2024 12:38) (103/65 - 108/61)  BP(mean): --  RR: 18 (20 Mar 2024 12:38) (17 - 18)  SpO2: 97% (20 Mar 2024 12:38) (94% - 98%)    Parameters below as of 20 Mar 2024 12:38  Patient On (Oxygen Delivery Method): room air        LABS: Personally reviewed and interpreted                        8.0    34.63 )-----------( 404      ( 20 Mar 2024 07:56 )             26.5   03-20    133<L>  |  98  |  14  ----------------------------<  113<H>  3.4<L>   |  27  |  0.74    Ca    8.8      20 Mar 2024 07:56  Phos  2.5     03-20  Mg     2.0     03-20    TPro  5.3<L>  /  Alb  2.2<L>  /  TBili  0.3  /  DBili  x   /  AST  14  /  ALT  10  /  AlkPhos  147<H>  03-19      RADIOLOGY & ADDITIONAL STUDIES: Personally reviewed and interpreted  None new    DISCUSSION OF CASE: Family - to provide updates and emotional support; Primary Team/RN - to discuss plan of care [facility]  Eddie Ortiz, Palliative Care Attending  Contact Info: Call [phone] (HEAL Line) or message on Microsoft Teams (Eddie Ortiz)    SUBJECTIVE AND OBJECTIVE:  INTERVAL HPI/OVERNIGHT EVENTS: Interval events noted. Feels very anxious but able to participate in interview. Denies pain or shortness of breath. See patient's PRN use for the past 24hrs noted below. Comprehensive symptom assessment and GOC exploration as noted below. Extensive time spent discussing plan of care with patient/family.    ALLERGIES:  No Known Allergies    MEDICATIONS  (STANDING):  acetaminophen     Tablet .. 1000 milliGRAM(s) Oral every 8 hours  albuterol/ipratropium for Nebulization 3 milliLiter(s) Nebulizer every 6 hours  clonazePAM  Tablet 0.5 milliGRAM(s) Oral at bedtime  doxazosin 4 milliGRAM(s) Oral at bedtime  finasteride 5 milliGRAM(s) Oral daily  pantoprazole    Tablet 40 milliGRAM(s) Oral before breakfast  piperacillin/tazobactam IVPB.. 4.5 Gram(s) IV Intermittent every 8 hours  scopolamine 1 mG/72 Hr(s) Patch 1 Patch Transdermal every 72 hours  sodium chloride 3%  Inhalation 4 milliLiter(s) Inhalation every 6 hours    MEDICATIONS  (PRN):  clonazePAM  Tablet 0.5 milliGRAM(s) Oral every 6 hours PRN Anxiety/Agitation  melatonin 3 milliGRAM(s) Oral at bedtime PRN Sleep  morphine  - Injectable 3 milliGRAM(s) IV Push every 4 hours PRN Severe Pain (7 - 10) or SOB/RR>22      Analgesic Use (Scheduled and PRNs) for past 24 hours:  acetaminophen     Tablet ..   650 milliGRAM(s) Oral (03-20-24 @ 12:50)   650 milliGRAM(s) Oral (03-20-24 @ 02:33)   650 milliGRAM(s) Oral (03-19-24 @ 21:40)   650 milliGRAM(s) Oral (03-19-24 @ 16:31)    clonazePAM Oral Disintegrating Tablet   0.5 milliGRAM(s) Oral (03-20-24 @ 14:38)      ITEMS UNCHECKED ARE NOT PRESENT  PRESENT SYMPTOMS/REVIEW OF SYSTEMS: []Unable to obtain due to poor mentation   Source if other than patient:  []Family   []Team     Pain: [x] yes [] no  QOL impact - tolerable  Location - generalized  Aggravating factors -  Quality -  Radiation -  Timing - constant  Severity (0-10 scale) -  Minimal acceptable level (0-10 scale) -    Dyspnea:                           []Mild  []Moderate []Severe  Anxiety:                             []Mild []Moderate [x]Severe  Fatigue:                             []Mild []Moderate []Severe  Nausea:                             []Mild []Moderate []Severe  Loss of appetite:              []Mild []Moderate []Severe  Constipation:                    []Mild []Moderate []Severe    Other Symptoms:  [x]All other review of systems negative     Palliative Performance Status Version 2: 30%    GENERAL:  [x] NAD [x]Alert []Lethargic  []Cachexia  []Unarousable  [x]Verbal  []Non-Verbal  BEHAVIORAL:   [x]Anxiety  []Delirium []Agitation []Cooperative [x]Oriented x3  HEENT:  [x]Normal  [x] Moist Mucous Membranes []Dry mouth   []ET Tube/Trach  []Oral lesions  PULMONARY:   []Clear []Tachypnea  []Audible excessive secretions  [x]Normal Work of Breathing []Labored Breathing  []Rhonchi [x]Crackles []Wheezing  CARDIOVASCULAR:    [x]Regular Rate [x]Regular Rhythm []Irregular []Tachy  []Brandin  GASTROINTESTINAL:  [x]Soft  []Distended   [x]+BS  [x]Non tender []Tender  []PEG []OGT/ NGT  Last BM:  GENITOURINARY:  []Normal [] Incontinent   []Oliguria/Anuria   [x]Gerber  MUSCULOSKELETAL:   []Normal Extremities  [x]Weakness  []Bed/Wheelchair bound []Edema  NEUROLOGIC:   [x]No focal deficits  []Cognitive impairment  []Dysphagia []Dysarthria []Paresis []Encephalopathic  SKIN:   [x]Normal   []Pressure ulcer(s)  []Rash    Vital Signs Last 24 Hrs  T(C): 36.3 (20 Mar 2024 12:38), Max: 36.7 (19 Mar 2024 21:14)  T(F): 97.4 (20 Mar 2024 12:38), Max: 98 (19 Mar 2024 21:14)  HR: 110 (20 Mar 2024 12:38) (92 - 110)  BP: 104/62 (20 Mar 2024 12:38) (103/65 - 108/61)  BP(mean): --  RR: 18 (20 Mar 2024 12:38) (17 - 18)  SpO2: 97% (20 Mar 2024 12:38) (94% - 98%)    Parameters below as of 20 Mar 2024 12:38  Patient On (Oxygen Delivery Method): room air    LABS: Personally reviewed and interpreted                     8.0    34.63 )-----------( 404      ( 20 Mar 2024 07:56 )             26.5   03-20    133<L>  |  98  |  14  ----------------------------<  113<H>  3.4<L>   |  27  |  0.74    Ca    8.8      20 Mar 2024 07:56  Phos  2.5     03-20  Mg     2.0     03-20  TPro  5.3<L>  /  Alb  2.2<L>  /  TBili  0.3  /  DBili  x   /  AST  14  /  ALT  10  /  AlkPhos  147<H>  03-19      RADIOLOGY & ADDITIONAL STUDIES: Personally reviewed and interpreted  None new    DISCUSSION OF CASE: Sisters - to provide updates and emotional support; Primary Team/RN - to discuss plan of care

## 2024-03-20 NOTE — CHART NOTE - NSCHARTNOTEFT_GEN_A_CORE
ISTOP Reference #: [id number]    Practitioner Count: 1  Pharmacy Count: 1  Current Opioid Prescriptions: 0  Current Benzodiazepine Prescriptions: 1  Current Stimulant Prescriptions: 0    PDI	My Rx	Current Rx	Drug Type	Rx Written	Rx Dispensed	Drug	Quantity	Days Supply	Prescriber Name	Prescriber EDEN #	Payment Method	Dispenser  A	N	N	B	12/11/2023	12/12/2023	clonazepam 1 mg tablet	30	30	Chirag Hernandez MD	[id number]	Medicare	[id number]  A	N	N	B	09/27/2023	09/30/2023	clonazepam 1 mg tablet	30	30	Chirag Hernandez MD	[id number]	Medicare	[id number]  A	N	N	B	07/31/2023	08/05/2023	clonazepam 1 mg tablet	30	30	Chirag Hernandez MD	[id number]	Medicare	[id number]  A	N	N	B	05/09/2023	05/12/2023	clonazepam 1 mg tablet	30	30	Chirag Hernandez MD	[id number]	Medicare	[id number]  B	N	Y	B	03/05/2024	03/07/2024	clonazepam 0.125 mg dis tab	14	14	Janie Diaz	[id number]	[place]	[facility]  B	N	N	B	02/29/2024	03/01/2024	clonazepam 0.125 mg dis tab	7	7	Janie Diaz	[id number]	[place]	[facility]

## 2024-03-20 NOTE — PROGRESS NOTE ADULT - PROBLEM SELECTOR PLAN 1
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 s/p tpa/dornase MIST 2 protocol, discharged to [facility] w/ Abx. Pleural studies c/f complicated parapneumonic effusion. Showing elevated LDH >2500, neutrophil predominance, low glucose c/f persistent infection despite tx.  - pulm recs  - c/w empiric abx w/ vanc and zosyn  - r/u repeat CT Chest w/ IV contrast  - CTSx consult for possible VATS/washout, f/u recs seen by pulm on previous admission for MPE s/p IPC placement by Dr. Lawrence. bedside US on previous admission in 02/2024, right sided small pleural effusion with visible septations. Per Dr. Lawrence who placed pleurx, effusion was septated when pleurx was initially placed. cell count shows 95% neutrophils. Glucose 14 and LDH >2500, concerning for complicated parapneumonic effusion. Pt now s/p MIST2 protocol. CTAP at that admission showed subcarinal and right hilar LAD and b/l pleural effusion with right sided hydropneumothorax. Pleural fluid cyto POSITIVE FOR MALIGNANT CELLS. Keratinizing  squamous cell carcinoma, predominantly dispersed cells, in background of purulent inflammation. Patient was supposed to have follwed with pulm as outpatient however refused to attend f/u appt. Subsequently required EOD drainage of pleurx while at [facility]. Pulm consulted in ED.   - f/u pulm recs  - palliative consulted Diagnosed 10/2023, c/b worsening R pleural effusion. s/p bronchoscopy, pleurx placement, and thoracentesis outpatient. EBUS FNA bx and BAL 11/2023 +malignant cells. RLL bx +SCC. Seen by oncology 12/2023 and was determined to be a good candidate for chemoradiation at that time however did not receive tx due to large pleural effusion. Admitted 2/2024 due to FTT, progressive dyspnea, fatigue, weight loss, night sweats. s/p MIST 2 protocol last admission and was discharged to [facility]. Presented again 3/18 with worsening sx despite treatment.     Dr Hubbard notified of pt admission, determined that pt was not a candidate for tx or chemo. Also consulted pulm and CTSx for further intervention, however given progression of disease pt is likely poor candidate. On further discussion of GOC w/ patient and sisters at bedside, states that he would like to be comfortable. Palliative consulted for hospice evaluation. s/p family meeting at bedside with patient and his two sisters: goal is symptom-directed care, prioritize comfort.    - pending home hospice vs [facility]  - comfort care, MEWS exempt  - tylenol 1g q8 standing  - klonopin 0.5 at bedtime standing, klonopin 0.5 q6 for anxiety/agitation  - morphine 3mg q4 PRN for severe pain or RR>22

## 2024-03-21 ENCOUNTER — NON-APPOINTMENT (OUTPATIENT)
Age: 89
End: 2024-03-21

## 2024-03-21 DIAGNOSIS — Z71.89 OTHER SPECIFIED COUNSELING: ICD-10-CM

## 2024-03-21 DIAGNOSIS — R53.81 OTHER MALAISE: ICD-10-CM

## 2024-03-21 DIAGNOSIS — G89.3 NEOPLASM RELATED PAIN (ACUTE) (CHRONIC): ICD-10-CM

## 2024-03-21 PROCEDURE — 99233 SBSQ HOSP IP/OBS HIGH 50: CPT

## 2024-03-21 PROCEDURE — 99233 SBSQ HOSP IP/OBS HIGH 50: CPT | Mod: GC

## 2024-03-21 RX ADMIN — Medication 1000 MILLIGRAM(S): at 13:48

## 2024-03-21 RX ADMIN — PANTOPRAZOLE SODIUM 40 MILLIGRAM(S): 20 TABLET, DELAYED RELEASE ORAL at 06:22

## 2024-03-21 RX ADMIN — Medication 4 MILLIGRAM(S): at 22:08

## 2024-03-21 RX ADMIN — SCOPALAMINE 1 PATCH: 1 PATCH, EXTENDED RELEASE TRANSDERMAL at 08:32

## 2024-03-21 RX ADMIN — Medication 0.5 MILLIGRAM(S): at 22:07

## 2024-03-21 RX ADMIN — Medication 3 MILLILITER(S): at 23:07

## 2024-03-21 RX ADMIN — Medication 3 MILLILITER(S): at 06:22

## 2024-03-21 RX ADMIN — Medication 3 MILLILITER(S): at 12:26

## 2024-03-21 RX ADMIN — Medication 1000 MILLIGRAM(S): at 01:34

## 2024-03-21 RX ADMIN — Medication 3 MILLILITER(S): at 18:26

## 2024-03-21 RX ADMIN — Medication 0.5 MILLIGRAM(S): at 08:36

## 2024-03-21 RX ADMIN — SCOPALAMINE 1 PATCH: 1 PATCH, EXTENDED RELEASE TRANSDERMAL at 18:26

## 2024-03-21 RX ADMIN — FINASTERIDE 5 MILLIGRAM(S): 5 TABLET, FILM COATED ORAL at 12:26

## 2024-03-21 RX ADMIN — Medication 1000 MILLIGRAM(S): at 12:26

## 2024-03-21 NOTE — PROGRESS NOTE ADULT - PROBLEM SELECTOR PLAN 4
Hgb on presentation 9.8, last hgb 11.7 1/2024. Pt denied hematemesis, hemoptysis, or melena. patient now with slightly pink urine draining from ruth cath. Likely AOCD and nutritional deficiencies i/s/o progressing lung cancer and significant weight loss. Iron studies from previous admission noted. U op from ruth (present at time of admission dark red.

## 2024-03-21 NOTE — PROGRESS NOTE ADULT - PROBLEM SELECTOR PLAN 2
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 s/p tpa/dornase MIST 2 protocol, discharged to [facility] w/ Abx. Pleural studies c/f complicated parapneumonic effusion. Showing elevated LDH >2500, neutrophil predominance, low glucose c/f persistent infection despite tx.  - pending hospice   - c/w q6 duonebs Pt with known RLL effusion 2/2 SCC, previously seen last admission 2/2024. s/p tpa/dornase MIST 2 protocol, discharged to [facility] w/ Abx. Pleural studies c/f complicated parapneumonic effusion. Showing elevated LDH >2500, neutrophil predominance, low glucose c/f persistent infection despite tx.  - pending hospice   - c/w q6 LASHAWN chavez

## 2024-03-21 NOTE — PROGRESS NOTE ADULT - PROBLEM SELECTOR PLAN 1
Diagnosed 10/2023, c/b worsening R pleural effusion. s/p bronchoscopy, pleurx placement, and thoracentesis outpatient. EBUS FNA bx and BAL 11/2023 +malignant cells. RLL bx +SCC. Seen by oncology 12/2023 and was determined to be a good candidate for chemoradiation at that time however did not receive tx due to large pleural effusion. Admitted 2/2024 due to FTT, progressive dyspnea, fatigue, weight loss, night sweats. s/p MIST 2 protocol last admission and was discharged to [facility]. Presented again 3/18 with worsening sx despite treatment.     Dr Hubbard notified of pt admission, determined that pt was not a candidate for tx or chemo. Also consulted pulm and CTSx for further intervention, however given progression of disease pt is likely poor candidate. On further discussion of GOC w/ patient and sisters at bedside, states that he would like to be comfortable. Palliative consulted for hospice evaluation. s/p family meeting at bedside with patient and his two sisters: goal is symptom-directed care, prioritize comfort.    - pending home hospice vs [facility]  - comfort care, MEWS exempt  - tylenol 1g q8 standing  - klonopin 0.5 at bedtime standing, klonopin 0.5 q6 for anxiety/agitation  - morphine 3mg q4 PRN for severe pain or RR>22 Diagnosed 10/2023, c/b worsening R pleural effusion. s/p bronchoscopy, pleurx placement, and thoracentesis outpatient. EBUS FNA bx and BAL 11/2023 +malignant cells. RLL bx +SCC. Seen by oncology 12/2023 and was determined to be a good candidate for chemoradiation at that time however did not receive tx due to large pleural effusion. Admitted 2/2024 due to FTT, progressive dyspnea, fatigue, weight loss, night sweats. s/p MIST 2 protocol last admission and was discharged to [facility]. Presented again 3/18 with worsening sx despite treatment.     Dr Hubbard notified of pt admission, determined that pt was not a candidate for tx or chemo. Also consulted pulm and CTSx for further intervention, however given progression of disease pt is likely poor candidate. On further discussion of C w/ patient and sisters at bedside, states that he would like to be comfortable. Palliative consulted for hospice evaluation. s/p family meeting at bedside with patient and his two sisters: goal is symptom-directed care, prioritize comfort.    - pending home hospice vs [facility]  - comfort care, MEWS exempt  - tylenol 1g q8 standing  - klonopin 0.5 at bedtime standing, klonopin 0.5 q6 for anxiety/agitation  - morphine 3mg q4 PRN for severe pain or RR>22  - intermittent pleurx drainage for comfort

## 2024-03-21 NOTE — PROGRESS NOTE ADULT - PROBLEM SELECTOR PLAN 5
likely reactive in nature, with increase also likely d/t iron deficiency, and likely intravascular volume depletion.

## 2024-03-21 NOTE — PROGRESS NOTE ADULT - PROBLEM SELECTOR PLAN 3
Meeting 3/4 SIRS criteria on admission +source. Likely 2/2 human metapnuemo vs untreated malignancy  - comfort care

## 2024-03-21 NOTE — PROGRESS NOTE ADULT - PROBLEM SELECTOR PLAN 10
Comfort care, mews exempt  - klonopin 0.5 at bedtime standing, klonopin 0.5 q6 PRN Bactrim Counseling:  I discussed with the patient the risks of sulfa antibiotics including but not limited to GI upset, allergic reaction, drug rash, diarrhea, dizziness, photosensitivity, and yeast infections.  Rarely, more serious reactions can occur including but not limited to aplastic anemia, agranulocytosis, methemoglobinemia, blood dyscrasias, liver or kidney failure, lung infiltrates or desquamative/blistering drug rashes.

## 2024-03-21 NOTE — PROGRESS NOTE ADULT - SUBJECTIVE AND OBJECTIVE BOX
TORRI Champagne  MRN-[id number]    INTERVAL Hx: The patient is in no mood to talk. He c/o cough.     HPI: 88M with stage IV squamous cell lung cancer (malignant pleural effusion) recently hospitalized at [facility] with failure to thrive, discharged to [facility], now admitted with worsening R pleural effusion. The patient was initially seen with the new diagnosis of NSCLC in 12/2023, but has not received any cancer directed treatment due to significant delays, partially due to his noncompliance with follow up appointments.     ROS:  + SOB, + cough  + weakness  No chest pain  + anorexia  No nausea/vomiting  No fevers/chills/night sweats.   No leg pain or leg swelling.    ROS is otherwise negative.    PMH/PSH:  PAST MEDICAL & SURGICAL HISTORY:  Lung cancer    History of BPH    S/P rotator cuff repair    H/O carpal tunnel repair        Medications:  MEDICATIONS  (STANDING):  albuterol/ipratropium for Nebulization 3 milliLiter(s) Nebulizer every 6 hours  atorvastatin 10 milliGRAM(s) Oral at bedtime  doxazosin 4 milliGRAM(s) Oral at bedtime  finasteride 5 milliGRAM(s) Oral daily  heparin   Injectable 5000 Unit(s) SubCutaneous every 8 hours  pantoprazole    Tablet 40 milliGRAM(s) Oral before breakfast  piperacillin/tazobactam IVPB.. 4.5 Gram(s) IV Intermittent every 8 hours  sodium chloride 3%  Inhalation 4 milliLiter(s) Inhalation every 6 hours    MEDICATIONS  (PRN):  acetaminophen     Tablet .. 650 milliGRAM(s) Oral every 6 hours PRN Temp greater or equal to 38C (100.4F), Mild Pain (1 - 3)  clonazePAM Oral Disintegrating Tablet 0.125 milliGRAM(s) Oral daily PRN anxiety  melatonin 3 milliGRAM(s) Oral at bedtime PRN Sleep    heparin   Injectable 5000 Unit(s) SubCutaneous every 8 hours        No Known Allergies    Allergies    No Known Allergies    Intolerances        Exam:  Vital Signs Last 24 Hrs  T(C): 36.5 (21 Mar 2024 07:34), Max: 36.5 (21 Mar 2024 07:34)  T(F): 97.7 (21 Mar 2024 07:34), Max: 97.7 (21 Mar 2024 07:34)  HR: 105 (21 Mar 2024 07:34) (90 - 110)  BP: 85/50 (21 Mar 2024 07:34) (85/50 - 104/62)  BP(mean): 62 (21 Mar 2024 07:34) (62 - 62)  RR: 18 (21 Mar 2024 07:34) (17 - 18)  SpO2: 99% (21 Mar 2024 07:34) (97% - 99%)    Parameters below as of 21 Mar 2024 07:34  Patient On (Oxygen Delivery Method): room air      ill appearing, thin man  HEENT: pale mucosae, anicteric sclerae  No palpable peripheral lymphadenopathy  COR: tachycardic no murmurs, rubs or gallops  PULMO: + coarse rhonchi, decreased BS on R  ABD: soft, no palpable masses, no splenomegaly                        8.0    34.63 )-----------( 404      ( 20 Mar 2024 07:56 )             26.5         CBC Full  -  ( 20 Mar 2024 07:56 )  WBC Count : 34.63 K/uL  RBC Count : 3.24 M/uL  Hemoglobin : 8.0 g/dL  Hematocrit : 26.5 %  Platelet Count - Automated : 404 K/uL  Mean Cell Volume : 81.8 fl  Mean Cell Hemoglobin : 24.7 pg  Mean Cell Hemoglobin Concentration : 30.2 gm/dL  Auto Neutrophil # : 30.55 K/uL  Auto Lymphocyte # : 2.12 K/uL  Auto Monocyte # : 1.32 K/uL  Auto Eosinophil # : 0.06 K/uL  Auto Basophil # : 0.07 K/uL  Auto Neutrophil % : 88.2 %  Auto Lymphocyte % : 6.1 %  Auto Monocyte % : 3.8 %  Auto Eosinophil % : 0.2 %  Auto Basophil % : 0.2 %        03-20    133<L>  |  98  |  14  ----------------------------<  113<H>  3.4<L>   |  27  |  0.74    Ca    8.8      20 Mar 2024 07:56  Phos  2.5     03-20  Mg     2.0     03-20          EXT: no edema    Labs:       Pertinent imaging studies: reviewed    Assessment:    Stage IV NSCLC (squamous cell ca)    Plan:    Significant clinical decline/readmitted with worsening pleural effusion  Poor ECOG PS and functional status  Appreciate palliative care input  Agree with plans for transition of care to hospice    Thank you    Lauren Hubbard MD  [phone]

## 2024-03-21 NOTE — PROGRESS NOTE ADULT - PROBLEM SELECTOR PLAN 7
Patient with Hx of BPH on medications outpatient. Retaining on previous admission. Trial of straight cath was unsuccessful therefore urology was called for Ruth placement. Trial of straight cath was unsuccessful therefore urology was called for Ruth placement, and requirment volume rescucitation i/s/o post-obstructive diuresis. Patient with ruth on present on this admission. Pending TOV.  - c/w terazosin + finasteride  - failed TOV, ruth replaced 3/19

## 2024-03-21 NOTE — CHART NOTE - NSCHARTNOTEFT_GEN_A_CORE
SUBJECTIVE AND OBJECTIVE:  INTERVAL HPI/OVERNIGHT EVENTS: Interval events noted. Feels very anxious but able to participate in interview. Denies pain or shortness of breath. See patient's PRN use for the past 24hrs noted below. Comprehensive symptom assessment and GOC exploration as noted below. Extensive time spent discussing plan of care with patient/family.    ALLERGIES:  No Known Allergies    MEDICATIONS  (STANDING):  acetaminophen     Tablet .. 1000 milliGRAM(s) Oral every 8 hours  albuterol/ipratropium for Nebulization 3 milliLiter(s) Nebulizer every 6 hours  clonazePAM  Tablet 0.5 milliGRAM(s) Oral at bedtime  doxazosin 4 milliGRAM(s) Oral at bedtime  finasteride 5 milliGRAM(s) Oral daily  pantoprazole    Tablet 40 milliGRAM(s) Oral before breakfast  piperacillin/tazobactam IVPB.. 4.5 Gram(s) IV Intermittent every 8 hours  scopolamine 1 mG/72 Hr(s) Patch 1 Patch Transdermal every 72 hours  sodium chloride 3%  Inhalation 4 milliLiter(s) Inhalation every 6 hours    MEDICATIONS  (PRN):  clonazePAM  Tablet 0.5 milliGRAM(s) Oral every 6 hours PRN Anxiety/Agitation  melatonin 3 milliGRAM(s) Oral at bedtime PRN Sleep  morphine  - Injectable 3 milliGRAM(s) IV Push every 4 hours PRN Severe Pain (7 - 10) or SOB/RR>22      Analgesic Use (Scheduled and PRNs) for past 24 hours:  acetaminophen     Tablet ..   650 milliGRAM(s) Oral (03-20-24 @ 12:50)   650 milliGRAM(s) Oral (03-20-24 @ 02:33)   650 milliGRAM(s) Oral (03-19-24 @ 21:40)   650 milliGRAM(s) Oral (03-19-24 @ 16:31)    clonazePAM Oral Disintegrating Tablet   0.5 milliGRAM(s) Oral (03-20-24 @ 14:38)      ITEMS UNCHECKED ARE NOT PRESENT  PRESENT SYMPTOMS/REVIEW OF SYSTEMS: []Unable to obtain due to poor mentation   Source if other than patient:  []Family   []Team     Pain: [x] yes [] no  QOL impact - tolerable  Location - generalized  Aggravating factors -  Quality -  Radiation -  Timing - constant  Severity (0-10 scale) -  Minimal acceptable level (0-10 scale) -    Dyspnea:                           []Mild  []Moderate []Severe  Anxiety:                             []Mild []Moderate [x]Severe  Fatigue:                             []Mild []Moderate []Severe  Nausea:                             []Mild []Moderate []Severe  Loss of appetite:              []Mild []Moderate []Severe  Constipation:                    []Mild []Moderate []Severe    Other Symptoms:  [x]All other review of systems negative     Palliative Performance Status Version 2: 30%    GENERAL:  [x] NAD [x]Alert []Lethargic  []Cachexia  []Unarousable  [x]Verbal  []Non-Verbal  BEHAVIORAL:   [x]Anxiety  []Delirium []Agitation []Cooperative [x]Oriented x3  HEENT:  [x]Normal  [x] Moist Mucous Membranes []Dry mouth   []ET Tube/Trach  []Oral lesions  PULMONARY:   []Clear []Tachypnea  []Audible excessive secretions  [x]Normal Work of Breathing []Labored Breathing  []Rhonchi [x]Crackles []Wheezing  CARDIOVASCULAR:    [x]Regular Rate [x]Regular Rhythm []Irregular []Tachy  []Brandin  GASTROINTESTINAL:  [x]Soft  []Distended   [x]+BS  [x]Non tender []Tender  []PEG []OGT/ NGT  Last BM:  GENITOURINARY:  []Normal [] Incontinent   []Oliguria/Anuria   [x]Gerber  MUSCULOSKELETAL:   []Normal Extremities  [x]Weakness  []Bed/Wheelchair bound []Edema  NEUROLOGIC:   [x]No focal deficits  []Cognitive impairment  []Dysphagia []Dysarthria []Paresis []Encephalopathic  SKIN:   [x]Normal   []Pressure ulcer(s)  []Rash    Vital Signs Last 24 Hrs  T(C): 36.5 (21 Mar 2024 07:34), Max: 36.5 (21 Mar 2024 07:34)  T(F): 97.7 (21 Mar 2024 07:34), Max: 97.7 (21 Mar 2024 07:34)  HR: 105 (21 Mar 2024 07:34) (90 - 110)  BP: 85/50 (21 Mar 2024 07:34) (85/50 - 104/62)  BP(mean): 62 (21 Mar 2024 07:34) (62 - 62)  RR: 18 (21 Mar 2024 07:34) (17 - 18)  SpO2: 99% (21 Mar 2024 07:34) (97% - 99%)    Parameters below as of 21 Mar 2024 07:34  Patient On (Oxygen Delivery Method): room air    LABS: Personally reviewed and interpreted                     8.0    34.63 )-----------( 404      ( 20 Mar 2024 07:56 )             26.5   03-20    133<L>  |  98  |  14  ----------------------------<  113<H>  3.4<L>   |  27  |  0.74    Ca    8.8      20 Mar 2024 07:56  Phos  2.5     03-20  Mg     2.0     03-20  TPro  5.3<L>  /  Alb  2.2<L>  /  TBili  0.3  /  DBili  x   /  AST  14  /  ALT  10  /  AlkPhos  147<H>  03-19      RADIOLOGY & ADDITIONAL STUDIES: Personally reviewed and interpreted  None new    DISCUSSION OF CASE: Sisters - to provide updates and emotional support; Primary Team/RN - to discuss plan of care      Goals of Care:    GOALS OF CARE:  · Participants	Patient; Family  · Relative	Sisters     Conversation Discussion:  · Conversation	Diagnosis; Prognosis; MOLST Discussed; Treatment Options; Hospice Referral  · Conversation Details	Reviewed patient's hospital course and interval developments. Discussed advanced directives including code status, HCP completion, and hospice eligibility. Patient and sisters are understanding that he is a poor candidate for DMT and is not being offered treatment for his cancer. Patient states he would like to prioritize his symptom management. In agreement with foregoing interventions that will not improve his symptom burden. DNR/DNI, CMO. Introduced the role of hospice services and delineated the benefits provided. Differentiated inpatient vs home hospice and explained the intended philosophy of care. Patient is appropriate for home hospice at this time and amenable to referral. Patient is unable to have home hospice in [facility] due to lack of support but his sisters would like to explore referral in [place] at their homes. Alternatively, patient would be eligible for [facility] for end of life care. Will send both referrals and continue conversation with patient and family.     Treatment Guidelines:  · Decision Maker	Patient  · Treatment Guidelines	DNR; DNI; Comfort measures only; No blood draws; No artificial nutrition     MOLST:  · Completed	20-Mar-2024     Location of Discussion:  · Location of discussion	Face to face     Time Spent on Advance Care Planning:  Attending or LUIS Only.     I personally spent 20 minutes on advance care planning services with the patient. This time is separate and distinct from any other care management services provided on this date.    Assessment and Plan:   · Assessment	  89yo M with PMH of Metastatic Lung CA, BPH, and Anxiety p/w dyspnea and found to have progression of disease. Palliative consulted for complex medical decision making and symptom management in the setting of life-limiting illness.    s/p family meeting at bedside with patient and his two sisters: goal is symptom-directed care, prioritize comfort  discussed hospice options, patient and family are amenable: to be determined whether Home Hospice in [place] with sister vs [facility]  symptom regimen ordered and adjusted  DNR/DNI, MEWS, CMO       Problem/Plan - 1:  ·  Problem: Anxiety.   ·  Plan: .  -Clonazepam 0.5mg PO qhs  -Clonazepam 0.5mg q6h PRN for Anxiety.     Problem/Plan - 2:  ·  Problem: Shortness of breath.   ·  Plan: .  -Morphine 3mg IV q4h PRN for Severe Pain or Dyspnea/RR>22  -Scopolamine Patch q72hr.     Problem/Plan - 3:  ·  Problem: Neoplasm related pain.   ·  Plan: .  -Tylenol 1000mg PO q8h ATC  -Morphine 3mg IV q4h PRN for Severe Pain or Dyspnea/RR>22.     Problem/Plan - 4:  ·  Problem: Debility.   ·  Plan: .  PPSV = 40%, requires total assistance for all ADLs  -c/w supportive care, turning and positioning.     Problem/Plan - 5:  ·  Problem: Squamous cell lung cancer.   ·  Plan: .  Metastatic disease, progressed through previous treatment  -not a candidate for further DMT  -hospice eligible and appropriate.     Problem/Plan - 6:  ·  Problem: Advanced care planning/counseling discussion.   ·  Plan: .  Patient is DNR/DNI, MOLST in chart  -sister is HCP  -see [facility] note re: hospice referral    In addition to the E/M visit, an advance care planning meeting was performed. Start time: 2:00PM; End time: 2:20PM; Total time: 20min. A face to face meeting to discuss advance care planning was held today regarding: TORRI MARTINEZ. Primary decision maker: Patient is able to participate in decision making; Alternate/surrogate: Sister. Discussed advance directives including, but not limited to, healthcare proxy and code status. Decision regarding code status: DNR/DNI; Documentation completed today: [facility] Hospice Referral [facility] note.     Problem/Plan - 7:  ·  Problem: Encounter for palliative care.   ·  Plan: .  Complex medical decision making / symptom management in the setting of metastatic malignancy.    Will continue to follow for ongoing GOC discussion / titration of palliative regimen. Emotional support provided, questions answered.  Active Psychosocial Referrals:  [x]Social Work/Case management [x]PT/OT [x]Chaplaincy [x]Hospice  []Patient/Family Support []Holistic RN [x]Massage Therapy [x]Music Therapy []Ethics  Coping: [] well [x] with difficulty [] poor coping [] unable to assess  Support system: [] strong [x] adequate [] inadequate    For new or uncontrolled symptoms, please call Palliative Care at [phone]). The service is available 24/7 (including nights & weekends) to provide symptom management recommendations over the phone as appropriate.

## 2024-03-21 NOTE — PROGRESS NOTE ADULT - PROBLEM SELECTOR PLAN 9
BMI 18, significant weight loss due to FTT and untreated malignancy. per nutrition consult from last visit recommended regular diet with Ensure Enlive twice per day.  - regular diet

## 2024-03-21 NOTE — PROGRESS NOTE ADULT - SUBJECTIVE AND OBJECTIVE BOX
O/N Events:    Subjective/ROS: Patient seen and examined at bedside.     Denies Fever/Chills, HA, CP, SOB, n/v, changes in bowel/urinary habits.  12pt ROS otherwise negative.    VITALS  Vital Signs Last 24 Hrs  T(C): 36.5 (21 Mar 2024 07:34), Max: 36.5 (21 Mar 2024 07:34)  T(F): 97.7 (21 Mar 2024 07:34), Max: 97.7 (21 Mar 2024 07:34)  HR: 105 (21 Mar 2024 07:34) (90 - 110)  BP: 85/50 (21 Mar 2024 07:34) (85/50 - 104/62)  BP(mean): 62 (21 Mar 2024 07:34) (62 - 62)  RR: 18 (21 Mar 2024 07:34) (17 - 18)  SpO2: 99% (21 Mar 2024 07:34) (97% - 99%)    Parameters below as of 21 Mar 2024 07:34  Patient On (Oxygen Delivery Method): room air        CAPILLARY BLOOD GLUCOSE          PHYSICAL EXAM  General: NAD  Head: NC/AT; MMM; PERRL; EOMI;  Neck: Supple; no JVD  Respiratory: CTAB; no wheezes/rales/rhonchi  Cardiovascular: Regular rhythm/rate; S1/S2+, no murmurs, rubs gallops   Gastrointestinal: Soft; NTND; bowel sounds normal and present  Extremities: WWP; no edema/cyanosis  Neurological: A&Ox3, CNII-XII grossly intact; no obvious focal deficits    MEDICATIONS  (STANDING):  acetaminophen     Tablet .. 1000 milliGRAM(s) Oral every 8 hours  albuterol/ipratropium for Nebulization 3 milliLiter(s) Nebulizer every 6 hours  clonazePAM  Tablet 0.5 milliGRAM(s) Oral at bedtime  doxazosin 4 milliGRAM(s) Oral at bedtime  finasteride 5 milliGRAM(s) Oral daily  pantoprazole    Tablet 40 milliGRAM(s) Oral before breakfast  scopolamine 1 mG/72 Hr(s) Patch 1 Patch Transdermal every 72 hours    MEDICATIONS  (PRN):  clonazePAM  Tablet 0.5 milliGRAM(s) Oral every 6 hours PRN Anxiety/Agitation  melatonin 3 milliGRAM(s) Oral at bedtime PRN Sleep  morphine  - Injectable 3 milliGRAM(s) IV Push every 4 hours PRN Severe Pain (7 - 10) or SOB/RR>22      No Known Allergies      LABS                        8.0    34.63 )-----------( 404      ( 20 Mar 2024 07:56 )             26.5     03-20    133<L>  |  98  |  14  ----------------------------<  113<H>  3.4<L>   |  27  |  0.74    Ca    8.8      20 Mar 2024 07:56  Phos  2.5     03-20  Mg     2.0     03-20        Urinalysis Basic - ( 20 Mar 2024 07:56 )    Color: x / Appearance: x / SG: x / pH: x  Gluc: 113 mg/dL / Ketone: x  / Bili: x / Urobili: x   Blood: x / Protein: x / Nitrite: x   Leuk Esterase: x / RBC: x / WBC x   Sq Epi: x / Non Sq Epi: x / Bacteria: x              IMAGING/EKG/ETC   O/N Events: MAYANK    Subjective/ROS: Patient seen and examined at bedside. Resting comfortably,     Denies Fever/Chills, HA, CP, SOB, n/v, changes in bowel/urinary habits.  12pt ROS otherwise negative.    VITALS  Vital Signs Last 24 Hrs  T(C): 36.5 (21 Mar 2024 07:34), Max: 36.5 (21 Mar 2024 07:34)  T(F): 97.7 (21 Mar 2024 07:34), Max: 97.7 (21 Mar 2024 07:34)  HR: 105 (21 Mar 2024 07:34) (90 - 110)  BP: 85/50 (21 Mar 2024 07:34) (85/50 - 104/62)  BP(mean): 62 (21 Mar 2024 07:34) (62 - 62)  RR: 18 (21 Mar 2024 07:34) (17 - 18)  SpO2: 99% (21 Mar 2024 07:34) (97% - 99%)    Parameters below as of 21 Mar 2024 07:34  Patient On (Oxygen Delivery Method): room air        CAPILLARY BLOOD GLUCOSE          PHYSICAL EXAM  General: NAD  Head: NC/AT; MMM; PERRL; EOMI;  Neck: Supple; no JVD  Respiratory: CTAB; no wheezes/rales/rhonchi  Cardiovascular: Regular rhythm/rate; S1/S2+, no murmurs, rubs gallops   Gastrointestinal: Soft; NTND; bowel sounds normal and present  Extremities: WWP; no edema/cyanosis  Neurological: A&Ox3, CNII-XII grossly intact; no obvious focal deficits    MEDICATIONS  (STANDING):  acetaminophen     Tablet .. 1000 milliGRAM(s) Oral every 8 hours  albuterol/ipratropium for Nebulization 3 milliLiter(s) Nebulizer every 6 hours  clonazePAM  Tablet 0.5 milliGRAM(s) Oral at bedtime  doxazosin 4 milliGRAM(s) Oral at bedtime  finasteride 5 milliGRAM(s) Oral daily  pantoprazole    Tablet 40 milliGRAM(s) Oral before breakfast  scopolamine 1 mG/72 Hr(s) Patch 1 Patch Transdermal every 72 hours    MEDICATIONS  (PRN):  clonazePAM  Tablet 0.5 milliGRAM(s) Oral every 6 hours PRN Anxiety/Agitation  melatonin 3 milliGRAM(s) Oral at bedtime PRN Sleep  morphine  - Injectable 3 milliGRAM(s) IV Push every 4 hours PRN Severe Pain (7 - 10) or SOB/RR>22      No Known Allergies      LABS                        8.0    34.63 )-----------( 404      ( 20 Mar 2024 07:56 )             26.5     03-20    133<L>  |  98  |  14  ----------------------------<  113<H>  3.4<L>   |  27  |  0.74    Ca    8.8      20 Mar 2024 07:56  Phos  2.5     03-20  Mg     2.0     03-20        Urinalysis Basic - ( 20 Mar 2024 07:56 )    Color: x / Appearance: x / SG: x / pH: x  Gluc: 113 mg/dL / Ketone: x  / Bili: x / Urobili: x   Blood: x / Protein: x / Nitrite: x   Leuk Esterase: x / RBC: x / WBC x   Sq Epi: x / Non Sq Epi: x / Bacteria: x              IMAGING/EKG/ETC   O/N Events: MAYANK    Subjective/ROS: Patient seen and examined at bedside. Resting comfortably, on NC. Pleurx drain being done by pulm at bedside, pt upset about drainage however not uncomfortable.    VITALS  Vital Signs Last 24 Hrs  T(C): 36.5 (21 Mar 2024 07:34), Max: 36.5 (21 Mar 2024 07:34)  T(F): 97.7 (21 Mar 2024 07:34), Max: 97.7 (21 Mar 2024 07:34)  HR: 105 (21 Mar 2024 07:34) (90 - 110)  BP: 85/50 (21 Mar 2024 07:34) (85/50 - 104/62)  BP(mean): 62 (21 Mar 2024 07:34) (62 - 62)  RR: 18 (21 Mar 2024 07:34) (17 - 18)  SpO2: 99% (21 Mar 2024 07:34) (97% - 99%)    Parameters below as of 21 Mar 2024 07:34  Patient On (Oxygen Delivery Method): room air        CAPILLARY BLOOD GLUCOSE          PHYSICAL EXAM  General: NAD  Head: pupils reactive  Neck: Supple; no JVD  Respiratory: diminished breath sounds b/l  Cardiovascular: Regular rhythm/rate; S1/S2+, no murmurs, rubs gallops   Gastrointestinal: Soft; NTND; bowel sounds normal and present  Extremities: WWP; no edema/cyanosis  Neurological: A&Ox2    MEDICATIONS  (STANDING):  acetaminophen     Tablet .. 1000 milliGRAM(s) Oral every 8 hours  albuterol/ipratropium for Nebulization 3 milliLiter(s) Nebulizer every 6 hours  clonazePAM  Tablet 0.5 milliGRAM(s) Oral at bedtime  doxazosin 4 milliGRAM(s) Oral at bedtime  finasteride 5 milliGRAM(s) Oral daily  pantoprazole    Tablet 40 milliGRAM(s) Oral before breakfast  scopolamine 1 mG/72 Hr(s) Patch 1 Patch Transdermal every 72 hours    MEDICATIONS  (PRN):  clonazePAM  Tablet 0.5 milliGRAM(s) Oral every 6 hours PRN Anxiety/Agitation  melatonin 3 milliGRAM(s) Oral at bedtime PRN Sleep  morphine  - Injectable 3 milliGRAM(s) IV Push every 4 hours PRN Severe Pain (7 - 10) or SOB/RR>22      No Known Allergies      LABS                        8.0    34.63 )-----------( 404      ( 20 Mar 2024 07:56 )             26.5     03-20    133<L>  |  98  |  14  ----------------------------<  113<H>  3.4<L>   |  27  |  0.74    Ca    8.8      20 Mar 2024 07:56  Phos  2.5     03-20  Mg     2.0     03-20        Urinalysis Basic - ( 20 Mar 2024 07:56 )    Color: x / Appearance: x / SG: x / pH: x  Gluc: 113 mg/dL / Ketone: x  / Bili: x / Urobili: x   Blood: x / Protein: x / Nitrite: x   Leuk Esterase: x / RBC: x / WBC x   Sq Epi: x / Non Sq Epi: x / Bacteria: x              IMAGING/EKG/ETC

## 2024-03-21 NOTE — PROGRESS NOTE ADULT - PROBLEM SELECTOR PLAN 6
I/s/o multiple medical problems including known malignancy, chronic gait instability. Progressive weight loss since lung cancer diagnosis. Previously lived alone but was unable to cook for self i/s/o fatigue and unstable gait. Was precviously discharged with regular diet, supplements and vitamins. PT evaluated and recommended CHENCHO. Palliative follow up was scheduled with Dr Rodriguez   - c/w Regular diet, recommend Ensure Max Protein 2x/day (150 kcal, 30 g protein per serving)   - comfort care, mews exempt

## 2024-03-21 NOTE — CHART NOTE - NSCHARTNOTEFT_GEN_A_CORE
300 cc drained via pleurx. Patient did not complain of discomfort during procedure. He is now comfort measures/DNR/DNI pending hospice placement. Recommend pleurx drainage for comfort 2x/week should be sufficient but would not drain if patient reports no benefit or reports discomfort with the procedure.     If nursing staff can drain pleurx on 7UR, recommend drainage 2x /week (every 3-4 days).     Raymond Ortiz  Pulmonary Fellow 300 cc drained via pleurx. Patient did not complain of discomfort during procedure. He is now comfort measures/DNR/DNI pending hospice placement. Recommend pleurx drainage for comfort 2x/week should be sufficient but would not drain if patient reports no benefit or reports discomfort with the procedure. Can pretreat w/ pain medications prior to drainages if that helps.    If nursing staff can drain pleurx on 7UR, recommend drainage 2x /week (every 3-4 days).     Raymond Ortiz  Pulmonary Fellow

## 2024-03-22 ENCOUNTER — TRANSCRIPTION ENCOUNTER (OUTPATIENT)
Age: 89
End: 2024-03-22

## 2024-03-22 ENCOUNTER — APPOINTMENT (OUTPATIENT)
Dept: PULMONOLOGY | Facility: CLINIC | Age: 89
End: 2024-03-22

## 2024-03-22 PROCEDURE — 99233 SBSQ HOSP IP/OBS HIGH 50: CPT | Mod: GC

## 2024-03-22 PROCEDURE — 99232 SBSQ HOSP IP/OBS MODERATE 35: CPT

## 2024-03-22 RX ORDER — MORPHINE SULFATE 50 MG/1
5 CAPSULE, EXTENDED RELEASE ORAL
Qty: 50 | Refills: 0
Start: 2024-03-22

## 2024-03-22 RX ORDER — CLONAZEPAM 1 MG
1 TABLET ORAL
Qty: 10 | Refills: 0
Start: 2024-03-22

## 2024-03-22 RX ADMIN — Medication 1000 MILLIGRAM(S): at 17:27

## 2024-03-22 RX ADMIN — Medication 0.5 MILLIGRAM(S): at 23:03

## 2024-03-22 RX ADMIN — PANTOPRAZOLE SODIUM 40 MILLIGRAM(S): 20 TABLET, DELAYED RELEASE ORAL at 06:26

## 2024-03-22 RX ADMIN — Medication 1000 MILLIGRAM(S): at 10:13

## 2024-03-22 RX ADMIN — Medication 1000 MILLIGRAM(S): at 09:43

## 2024-03-22 RX ADMIN — FINASTERIDE 5 MILLIGRAM(S): 5 TABLET, FILM COATED ORAL at 12:10

## 2024-03-22 RX ADMIN — SCOPALAMINE 1 PATCH: 1 PATCH, EXTENDED RELEASE TRANSDERMAL at 18:05

## 2024-03-22 RX ADMIN — Medication 0.5 MILLIGRAM(S): at 12:37

## 2024-03-22 RX ADMIN — Medication 1000 MILLIGRAM(S): at 17:57

## 2024-03-22 RX ADMIN — Medication 1000 MILLIGRAM(S): at 01:25

## 2024-03-22 RX ADMIN — Medication 3 MILLILITER(S): at 06:26

## 2024-03-22 RX ADMIN — Medication 3 MILLILITER(S): at 12:10

## 2024-03-22 RX ADMIN — Medication 0.5 MILLIGRAM(S): at 18:32

## 2024-03-22 RX ADMIN — Medication 0.5 MILLIGRAM(S): at 01:26

## 2024-03-22 RX ADMIN — Medication 3 MILLILITER(S): at 17:28

## 2024-03-22 NOTE — PROGRESS NOTE ADULT - PROBLEM SELECTOR PLAN 1
Diagnosed 10/2023, c/b worsening R pleural effusion. s/p bronchoscopy, pleurx placement, and thoracentesis outpatient. EBUS FNA bx and BAL 11/2023 +malignant cells. RLL bx +SCC. Seen by oncology 12/2023 and was determined to be a good candidate for chemoradiation at that time however did not receive tx due to large pleural effusion. Admitted 2/2024 due to FTT, progressive dyspnea, fatigue, weight loss, night sweats. s/p MIST 2 protocol last admission and was discharged to [facility]. Presented again 3/18 with worsening sx despite treatment.     Dr Hubbard notified of pt admission, determined that pt was not a candidate for tx or chemo. Also consulted pulm and CTSx for further intervention, however given progression of disease pt is likely poor candidate. On further discussion of C w/ patient and sisters at bedside, states that he would like to be comfortable. Palliative consulted for hospice evaluation. s/p family meeting at bedside with patient and his two sisters: goal is symptom-directed care, prioritize comfort.    - pending home hospice vs [facility]  - comfort care, MEWS exempt  - tylenol 1g q8 standing  - klonopin 0.5 at bedtime standing, klonopin 0.5 q6 for anxiety/agitation  - morphine 3mg q4 PRN for severe pain or RR>22  - intermittent pleurx drainage for comfort

## 2024-03-22 NOTE — PROGRESS NOTE ADULT - PROBLEM SELECTOR PLAN 2
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 s/p tpa/dornase MIST 2 protocol, discharged to [facility] w/ Abx. Pleural studies c/f complicated parapneumonic effusion. Showing elevated LDH >2500, neutrophil predominance, low glucose c/f persistent infection despite tx.  - [facility] hosp[facility]  - c/w q6 LASHAWN chavez

## 2024-03-22 NOTE — DISCHARGE NOTE NURSING/CASE MANAGEMENT/SOCIAL WORK - NSDCPEFALRISK_GEN_ALL_CORE
For information on Fall & Injury Prevention, visit: [email or web address] OR  [email or web address] OR  https://www.cdc.gov/steadi/patient.html

## 2024-03-22 NOTE — PROGRESS NOTE ADULT - SUBJECTIVE AND OBJECTIVE BOX
O/N Events: MAYANK    Subjective/ROS: Patient seen and examined at bedside. Resting comfortably, on NC, feeling anxious this morning.    Denies Fever/Chills, HA, CP, SOB, n/v, changes in bowel/urinary habits.  12pt ROS otherwise negative.    VITALS  Vital Signs Last 24 Hrs  T(C): 36.4 (22 Mar 2024 06:51), Max: 36.9 (21 Mar 2024 12:08)  T(F): 97.6 (22 Mar 2024 06:51), Max: 98.5 (21 Mar 2024 12:08)  HR: 100 (22 Mar 2024 06:51) (96 - 106)  BP: 95/55 (22 Mar 2024 06:51) (93/52 - 98/64)  BP(mean): 65 (21 Mar 2024 20:40) (65 - 65)  RR: 17 (22 Mar 2024 06:51) (17 - 18)  SpO2: 95% (22 Mar 2024 06:51) (95% - 96%)    Parameters below as of 21 Mar 2024 20:40  Patient On (Oxygen Delivery Method): nasal cannula  O2 Flow (L/min): 2      CAPILLARY BLOOD GLUCOSE          PHYSICAL EXAM  General: elderly ill appearing male  Head: pupils reactive  Neck: Supple; no JVD  Respiratory: diminished breath sounds b/l  Cardiovascular: Regular rhythm/rate; S1/S2+, no murmurs, rubs gallops   Gastrointestinal: Soft; NTND; bowel sounds normal and present  Extremities: WWP; no edema/cyanosis  Neurological: A&Ox2    MEDICATIONS  (STANDING):  acetaminophen     Tablet .. 1000 milliGRAM(s) Oral every 8 hours  albuterol/ipratropium for Nebulization 3 milliLiter(s) Nebulizer every 6 hours  clonazePAM  Tablet 0.5 milliGRAM(s) Oral at bedtime  doxazosin 4 milliGRAM(s) Oral at bedtime  finasteride 5 milliGRAM(s) Oral daily  pantoprazole    Tablet 40 milliGRAM(s) Oral before breakfast  scopolamine 1 mG/72 Hr(s) Patch 1 Patch Transdermal every 72 hours    MEDICATIONS  (PRN):  clonazePAM  Tablet 0.5 milliGRAM(s) Oral every 6 hours PRN Anxiety/Agitation  melatonin 3 milliGRAM(s) Oral at bedtime PRN Sleep  morphine  - Injectable 3 milliGRAM(s) IV Push every 4 hours PRN Severe Pain (7 - 10) or SOB/RR>22      No Known Allergies

## 2024-03-22 NOTE — PROGRESS NOTE ADULT - ATTENDING COMMENTS
extensive discussion today with patient and family - mutually agreed to discuss hospice options   palliative team did a family meeting and now patient is comfort care and pending [facility] ( [facility] acceptance)   appreciate pall, pulm and trlel onc recs extensive discussion today with patient and family - mutually agreed to discuss hospice options   palliative team did a family meeting and now patient is comfort care and pending [facility] vs home hospice ( plan for discharge monday to [place] 9 am )   appreciate pall, pulm and trell onc recs

## 2024-03-22 NOTE — DISCHARGE NOTE NURSING/CASE MANAGEMENT/SOCIAL WORK - PATIENT PORTAL LINK FT
You can access the FollowMyHealth Patient Portal offered by [facility] by registering at the following website: [email or web address]. By joining [facility]’s FollowMyHealth portal, you will also be able to view your health information using other applications (apps) compatible with our system.

## 2024-03-22 NOTE — CHART NOTE - NSCHARTNOTEFT_GEN_A_CORE
SUBJECTIVE AND OBJECTIVE:  INTERVAL HPI/OVERNIGHT EVENTS: Interval events noted. Feels very anxious but able to participate in interview. Denies pain or shortness of breath. See patient's PRN use for the past 24hrs noted below. Comprehensive symptom assessment and GOC exploration as noted below. Extensive time spent discussing plan of care with patient/family.    ALLERGIES:  No Known Allergies    MEDICATIONS  (STANDING):  acetaminophen     Tablet .. 1000 milliGRAM(s) Oral every 8 hours  albuterol/ipratropium for Nebulization 3 milliLiter(s) Nebulizer every 6 hours  clonazePAM  Tablet 0.5 milliGRAM(s) Oral at bedtime  doxazosin 4 milliGRAM(s) Oral at bedtime  finasteride 5 milliGRAM(s) Oral daily  pantoprazole    Tablet 40 milliGRAM(s) Oral before breakfast  piperacillin/tazobactam IVPB.. 4.5 Gram(s) IV Intermittent every 8 hours  scopolamine 1 mG/72 Hr(s) Patch 1 Patch Transdermal every 72 hours  sodium chloride 3%  Inhalation 4 milliLiter(s) Inhalation every 6 hours    MEDICATIONS  (PRN):  clonazePAM  Tablet 0.5 milliGRAM(s) Oral every 6 hours PRN Anxiety/Agitation  melatonin 3 milliGRAM(s) Oral at bedtime PRN Sleep  morphine  - Injectable 3 milliGRAM(s) IV Push every 4 hours PRN Severe Pain (7 - 10) or SOB/RR>22      Analgesic Use (Scheduled and PRNs) for past 24 hours:  acetaminophen     Tablet ..   650 milliGRAM(s) Oral (03-20-24 @ 12:50)   650 milliGRAM(s) Oral (03-20-24 @ 02:33)   650 milliGRAM(s) Oral (03-19-24 @ 21:40)   650 milliGRAM(s) Oral (03-19-24 @ 16:31)    clonazePAM Oral Disintegrating Tablet   0.5 milliGRAM(s) Oral (03-20-24 @ 14:38)      ITEMS UNCHECKED ARE NOT PRESENT  PRESENT SYMPTOMS/REVIEW OF SYSTEMS: []Unable to obtain due to poor mentation   Source if other than patient:  []Family   []Team     Pain: [x] yes [] no  QOL impact - tolerable  Location - generalized  Aggravating factors -  Quality -  Radiation -  Timing - constant  Severity (0-10 scale) -  Minimal acceptable level (0-10 scale) -    Dyspnea:                           []Mild  []Moderate []Severe  Anxiety:                             []Mild []Moderate [x]Severe  Fatigue:                             []Mild []Moderate []Severe  Nausea:                             []Mild []Moderate []Severe  Loss of appetite:              []Mild []Moderate []Severe  Constipation:                    []Mild []Moderate []Severe    Other Symptoms:  [x]All other review of systems negative     Palliative Performance Status Version 2: 30%    GENERAL:  [x] NAD [x]Alert []Lethargic  []Cachexia  []Unarousable  [x]Verbal  []Non-Verbal  BEHAVIORAL:   [x]Anxiety  []Delirium []Agitation []Cooperative [x]Oriented x3  HEENT:  [x]Normal  [x] Moist Mucous Membranes []Dry mouth   []ET Tube/Trach  []Oral lesions  PULMONARY:   []Clear []Tachypnea  []Audible excessive secretions  [x]Normal Work of Breathing []Labored Breathing  []Rhonchi [x]Crackles []Wheezing  CARDIOVASCULAR:    [x]Regular Rate [x]Regular Rhythm []Irregular []Tachy  []Brandin  GASTROINTESTINAL:  [x]Soft  []Distended   [x]+BS  [x]Non tender []Tender  []PEG []OGT/ NGT  Last BM:  GENITOURINARY:  []Normal [] Incontinent   []Oliguria/Anuria   [x]Gerber  MUSCULOSKELETAL:   []Normal Extremities  [x]Weakness  []Bed/Wheelchair bound []Edema  NEUROLOGIC:   [x]No focal deficits  []Cognitive impairment  []Dysphagia []Dysarthria []Paresis []Encephalopathic  SKIN:   [x]Normal   []Pressure ulcer(s)  []Rash    Vital Signs Last 24 Hrs  T(C): 36.4 (22 Mar 2024 06:51), Max: 36.9 (21 Mar 2024 12:08)  T(F): 97.6 (22 Mar 2024 06:51), Max: 98.5 (21 Mar 2024 12:08)  HR: 100 (22 Mar 2024 06:51) (96 - 106)  BP: 95/55 (22 Mar 2024 06:51) (93/52 - 98/64)  BP(mean): 65 (21 Mar 2024 20:40) (65 - 65)  RR: 17 (22 Mar 2024 06:51) (17 - 18)  SpO2: 95% (22 Mar 2024 06:51) (95% - 96%)      LABS: Personally reviewed and interpreted            none    RADIOLOGY & ADDITIONAL STUDIES: Personally reviewed and interpreted  None new    DISCUSSION OF CASE: Sisters - to provide updates and emotional support; Primary Team/RN - to discuss plan of care      Goals of Care:    GOALS OF CARE:  · Participants	Patient; Family  · Relative	Sisters     Conversation Discussion:  · Conversation	Diagnosis; Prognosis; MOLST Discussed; Treatment Options; Hospice Referral  · Conversation Details	Reviewed patient's hospital course and interval developments. Discussed advanced directives including code status, HCP completion, and hospice eligibility. Patient and sisters are understanding that he is a poor candidate for DMT and is not being offered treatment for his cancer. Patient states he would like to prioritize his symptom management. In agreement with foregoing interventions that will not improve his symptom burden. DNR/DNI, CMO. Introduced the role of hospice services and delineated the benefits provided. Differentiated inpatient vs home hospice and explained the intended philosophy of care. Patient is appropriate for home hospice at this time and amenable to referral. Patient is unable to have home hospice in [facility] due to lack of support but his sisters would like to explore referral in [place] at their homes. Alternatively, patient would be eligible for [facility] for end of life care. Will send both referrals and continue conversation with patient and family.     Treatment Guidelines:  · Decision Maker	Patient  · Treatment Guidelines	DNR; DNI; Comfort measures only; No blood draws; No artificial nutrition     MOLST:  · Completed	20-Mar-2024     Location of Discussion:  · Location of discussion	Face to face     Time Spent on Advance Care Planning:  Attending or LUIS Only.     I personally spent 20 minutes on advance care planning services with the patient. This time is separate and distinct from any other care management services provided on this date.    Assessment and Plan:   · Assessment	  87yo M with PMH of Metastatic Lung CA, BPH, and Anxiety p/w dyspnea and found to have progression of disease. Palliative consulted for complex medical decision making and symptom management in the setting of life-limiting illness.    s/p family meeting at bedside with patient and his two sisters: goal is symptom-directed care, prioritize comfort  discussed hospice options, patient and family are amenable: to be determined whether Home Hospice in [place] with sister vs [facility]  symptom regimen ordered and adjusted  DNR/DNI, MEWS, CMO       Problem/Plan - 1:  ·  Problem: Anxiety.   ·  Plan: .  -Clonazepam 0.5mg PO qhs  -Clonazepam 0.5mg q6h PRN for Anxiety.     Problem/Plan - 2:  ·  Problem: Shortness of breath.   ·  Plan: .  -Morphine 3mg IV q4h PRN for Severe Pain or Dyspnea/RR>22  -Scopolamine Patch q72hr.     Problem/Plan - 3:  ·  Problem: Neoplasm related pain.   ·  Plan: .  -Tylenol 1000mg PO q8h ATC  -Morphine 3mg IV q4h PRN for Severe Pain or Dyspnea/RR>22.     Problem/Plan - 4:  ·  Problem: Debility.   ·  Plan: .  PPSV = 40%, requires total assistance for all ADLs  -c/w supportive care, turning and positioning.     Problem/Plan - 5:  ·  Problem: Squamous cell lung cancer.   ·  Plan: .  Metastatic disease, progressed through previous treatment  -not a candidate for further DMT  -hospice eligible and appropriate.     Problem/Plan - 6:  ·  Problem: Advanced care planning/counseling discussion.   ·  Plan: .  Patient is DNR/DNI, MOLST in chart  -sister is HCP  -see GOC note re: hospice referral    In addition to the E/M visit, an advance care planning meeting was performed. Start time: 2:00PM; End time: 2:20PM; Total time: 20min. A face to face meeting to discuss advance care planning was held today regarding: TORRI MARTINEZ. Primary decision maker: Patient is able to participate in decision making; Alternate/surrogate: Sister. Discussed advance directives including, but not limited to, healthcare proxy and code status. Decision regarding code status: DNR/DNI; Documentation completed today: MOLST Hospice Referral GOC note.     Problem/Plan - 7:  ·  Problem: Encounter for palliative care.   ·  Plan: .  Complex medical decision making / symptom management in the setting of metastatic malignancy.    Will continue to follow for ongoing GOC discussion / titration of palliative regimen. Emotional support provided, questions answered.  Active Psychosocial Referrals:  [x]Social Work/Case management [x]PT/OT [x]Chaplaincy [x]Hospice  []Patient/Family Support []Holistic RN [x]Massage Therapy [x]Music Therapy []Ethics  Coping: [] well [x] with difficulty [] poor coping [] unable to assess  Support system: [] strong [x] adequate [] inadequate    For new or uncontrolled symptoms, please call Palliative Care at [phone] ([phone]). The service is available 24/7 (including nights & weekends) to provide symptom management recommendations over the phone as appropriate.

## 2024-03-23 LAB
CULTURE RESULTS: NO GROWTH — SIGNIFICANT CHANGE UP
GLUCOSE BLDC GLUCOMTR-MCNC: 118 MG/DL — HIGH (ref 70–99)
GLUCOSE BLDC GLUCOMTR-MCNC: 132 MG/DL — HIGH (ref 70–99)
SPECIMEN SOURCE: SIGNIFICANT CHANGE UP

## 2024-03-23 PROCEDURE — 99232 SBSQ HOSP IP/OBS MODERATE 35: CPT | Mod: GC

## 2024-03-23 RX ORDER — SODIUM CHLORIDE 9 MG/ML
1000 INJECTION, SOLUTION INTRAVENOUS
Refills: 0 | Status: DISCONTINUED | OUTPATIENT
Start: 2024-03-23 | End: 2024-03-23

## 2024-03-23 RX ORDER — ROBINUL 0.2 MG/ML
0.2 INJECTION INTRAMUSCULAR; INTRAVENOUS EVERY 6 HOURS
Refills: 0 | Status: DISCONTINUED | OUTPATIENT
Start: 2024-03-23 | End: 2024-03-26

## 2024-03-23 RX ORDER — MORPHINE SULFATE 50 MG/1
2 CAPSULE, EXTENDED RELEASE ORAL EVERY 4 HOURS
Refills: 0 | Status: DISCONTINUED | OUTPATIENT
Start: 2024-03-23 | End: 2024-03-26

## 2024-03-23 RX ADMIN — SCOPALAMINE 1 PATCH: 1 PATCH, EXTENDED RELEASE TRANSDERMAL at 18:00

## 2024-03-23 RX ADMIN — Medication 1000 MILLIGRAM(S): at 10:14

## 2024-03-23 RX ADMIN — ROBINUL 0.2 MILLIGRAM(S): 0.2 INJECTION INTRAMUSCULAR; INTRAVENOUS at 19:12

## 2024-03-23 RX ADMIN — SODIUM CHLORIDE 30 MILLILITER(S): 9 INJECTION, SOLUTION INTRAVENOUS at 11:49

## 2024-03-23 RX ADMIN — FINASTERIDE 5 MILLIGRAM(S): 5 TABLET, FILM COATED ORAL at 11:49

## 2024-03-23 RX ADMIN — ROBINUL 0.2 MILLIGRAM(S): 0.2 INJECTION INTRAMUSCULAR; INTRAVENOUS at 11:49

## 2024-03-23 RX ADMIN — SCOPALAMINE 1 PATCH: 1 PATCH, EXTENDED RELEASE TRANSDERMAL at 07:20

## 2024-03-23 RX ADMIN — Medication 3 MILLILITER(S): at 07:05

## 2024-03-23 RX ADMIN — Medication 3 MILLILITER(S): at 18:00

## 2024-03-23 RX ADMIN — Medication 3 MILLILITER(S): at 11:49

## 2024-03-23 RX ADMIN — Medication 1000 MILLIGRAM(S): at 09:44

## 2024-03-23 RX ADMIN — SCOPALAMINE 1 PATCH: 1 PATCH, EXTENDED RELEASE TRANSDERMAL at 17:39

## 2024-03-23 RX ADMIN — Medication 3 MILLILITER(S): at 02:11

## 2024-03-23 NOTE — PROGRESS NOTE ADULT - ATTENDING COMMENTS
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 The patient was initially seen with the new diagnosis of NSCLC in 12/2023, but has not received any cancer directed treatment. Pt was made DNR/DNI and comfort care measures only. Pt is awaiting hospice placement. Pt aspirated his food overnight.   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 The patient was initially seen with the new diagnosis of NSCLC in 12/2023, but has not received any cancer directed treatment. Pt was made DNR/DNI and comfort care measures only. Pt is awaiting hospice placement. Pt aspirated his food overnight.     #Stage IV squamous cell lung cancer   #Malignant pleural effusion  #Failure to thrive  #Aspiration   - Oncology following   -Pt was made DNR/DNI and comfort care measure only   -Will place NPO for now and start gentle hydration   -Aspiration precautions  -When pt is more alert then will resume a puree diet with assistance  -Pt is for home hospice on 3/25

## 2024-03-23 NOTE — PROGRESS NOTE ADULT - PROBLEM SELECTOR PLAN 1
Diagnosed 10/2023, c/b worsening R pleural effusion. s/p bronchoscopy, pleurx placement, and thoracentesis outpatient. EBUS FNA bx and BAL 11/2023 +malignant cells. RLL bx +SCC. Seen by oncology 12/2023 and was determined to be a good candidate for chemoradiation at that time however did not receive tx due to large pleural effusion. Admitted 2/2024 due to FTT, progressive dyspnea, fatigue, weight loss, night sweats. s/p MIST 2 protocol last admission and was discharged to [facility]. Presented again 3/18 with worsening sx despite treatment.     Dr Hubbard notified of pt admission, determined that pt was not a candidate for tx or chemo. Also consulted pulm and CTSx for further intervention, however given progression of disease pt is likely poor candidate. On further discussion of GOC w/ patient and sisters at bedside, states that he would like to be comfortable. Palliative consulted for hospice evaluation. s/p family meeting at bedside with patient and his two sisters: goal is symptom-directed care, prioritize comfort.    - pending home hospice vs [facility]  - comfort care, MEWS exempt  - tylenol 1g q8 standing  - klonopin 0.5 at bedtime standing, klonopin 0.5 q6 for anxiety/agitation  - morphine 2mg q4 PRN for severe pain or RR>22  - robinul 0.2 q6 standing for cough  - gentle hydration D5LR @30cc/hr x6 hours  - NPO due to aspiration, moist sponges to mouth  - intermittent pleurx drainage for comfort

## 2024-03-23 NOTE — PROGRESS NOTE ADULT - PROBLEM SELECTOR PLAN 2
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 s/p tpa/dornase MIST 2 protocol, discharged to [facility] w/ Abx. Pleural studies c/f complicated parapneumonic effusion. Showing elevated LDH >2500, neutrophil predominance, low glucose c/f persistent infection despite tx.  - [facility]  - c/w q6 LASHAWN chavez

## 2024-03-23 NOTE — PROGRESS NOTE ADULT - SUBJECTIVE AND OBJECTIVE BOX
O/N Events:    Subjective/ROS: Patient seen and examined at bedside.     Denies Fever/Chills, HA, CP, SOB, n/v, changes in bowel/urinary habits.  12pt ROS otherwise negative.    VITALS  Vital Signs Last 24 Hrs  T(C): 36.7 (23 Mar 2024 06:35), Max: 36.8 (22 Mar 2024 21:10)  T(F): 98.1 (23 Mar 2024 06:35), Max: 98.2 (22 Mar 2024 21:10)  HR: 100 (23 Mar 2024 06:35) (94 - 104)  BP: 104/59 (23 Mar 2024 06:35) (93/51 - 104/67)  BP(mean): 65 (22 Mar 2024 21:10) (65 - 80)  RR: 18 (23 Mar 2024 06:35) (18 - 19)  SpO2: 99% (23 Mar 2024 06:35) (95% - 99%)    Parameters below as of 23 Mar 2024 06:35  Patient On (Oxygen Delivery Method): nasal cannula  O2 Flow (L/min): 2      CAPILLARY BLOOD GLUCOSE          PHYSICAL EXAM  General: NAD  Head: NC/AT; MMM; PERRL; EOMI;  Neck: Supple; no JVD  Respiratory: CTAB; no wheezes/rales/rhonchi  Cardiovascular: Regular rhythm/rate; S1/S2+, no murmurs, rubs gallops   Gastrointestinal: Soft; NTND; bowel sounds normal and present  Extremities: WWP; no edema/cyanosis  Neurological: A&Ox3, CNII-XII grossly intact; no obvious focal deficits    MEDICATIONS  (STANDING):  acetaminophen     Tablet .. 1000 milliGRAM(s) Oral every 8 hours  albuterol/ipratropium for Nebulization 3 milliLiter(s) Nebulizer every 6 hours  clonazePAM  Tablet 0.5 milliGRAM(s) Oral at bedtime  dextrose 5% + lactated ringers. 1000 milliLiter(s) (30 mL/Hr) IV Continuous <Continuous>  doxazosin 4 milliGRAM(s) Oral at bedtime  finasteride 5 milliGRAM(s) Oral daily  glycopyrrolate Injectable 0.2 milliGRAM(s) IV Push every 6 hours  pantoprazole    Tablet 40 milliGRAM(s) Oral before breakfast  scopolamine 1 mG/72 Hr(s) Patch 1 Patch Transdermal every 72 hours    MEDICATIONS  (PRN):  clonazePAM  Tablet 0.5 milliGRAM(s) Oral every 6 hours PRN Anxiety/Agitation  melatonin 3 milliGRAM(s) Oral at bedtime PRN Sleep  morphine  - Injectable 2 milliGRAM(s) IV Push every 4 hours PRN Severe Pain (7 - 10)      No Known Allergies      LABS                      IMAGING/EKG/ETC

## 2024-03-24 PROCEDURE — 99232 SBSQ HOSP IP/OBS MODERATE 35: CPT

## 2024-03-24 PROCEDURE — 99358 PROLONG SERVICE W/O CONTACT: CPT | Mod: NC

## 2024-03-24 RX ORDER — SODIUM CHLORIDE 9 MG/ML
1000 INJECTION INTRAMUSCULAR; INTRAVENOUS; SUBCUTANEOUS
Refills: 0 | Status: DISCONTINUED | OUTPATIENT
Start: 2024-03-24 | End: 2024-03-26

## 2024-03-24 RX ADMIN — ROBINUL 0.2 MILLIGRAM(S): 0.2 INJECTION INTRAMUSCULAR; INTRAVENOUS at 01:30

## 2024-03-24 RX ADMIN — MORPHINE SULFATE 2 MILLIGRAM(S): 50 CAPSULE, EXTENDED RELEASE ORAL at 10:40

## 2024-03-24 RX ADMIN — Medication 0.5 MILLIGRAM(S): at 21:24

## 2024-03-24 RX ADMIN — ROBINUL 0.2 MILLIGRAM(S): 0.2 INJECTION INTRAMUSCULAR; INTRAVENOUS at 11:57

## 2024-03-24 RX ADMIN — MORPHINE SULFATE 2 MILLIGRAM(S): 50 CAPSULE, EXTENDED RELEASE ORAL at 23:30

## 2024-03-24 RX ADMIN — Medication 3 MILLILITER(S): at 17:32

## 2024-03-24 RX ADMIN — ROBINUL 0.2 MILLIGRAM(S): 0.2 INJECTION INTRAMUSCULAR; INTRAVENOUS at 17:32

## 2024-03-24 RX ADMIN — SODIUM CHLORIDE 70 MILLILITER(S): 9 INJECTION INTRAMUSCULAR; INTRAVENOUS; SUBCUTANEOUS at 10:19

## 2024-03-24 RX ADMIN — Medication 3 MILLILITER(S): at 11:57

## 2024-03-24 RX ADMIN — ROBINUL 0.2 MILLIGRAM(S): 0.2 INJECTION INTRAMUSCULAR; INTRAVENOUS at 23:36

## 2024-03-24 RX ADMIN — MORPHINE SULFATE 2 MILLIGRAM(S): 50 CAPSULE, EXTENDED RELEASE ORAL at 22:45

## 2024-03-24 RX ADMIN — ROBINUL 0.2 MILLIGRAM(S): 0.2 INJECTION INTRAMUSCULAR; INTRAVENOUS at 06:35

## 2024-03-24 RX ADMIN — Medication 4 MILLIGRAM(S): at 22:59

## 2024-03-24 RX ADMIN — MORPHINE SULFATE 2 MILLIGRAM(S): 50 CAPSULE, EXTENDED RELEASE ORAL at 10:19

## 2024-03-24 RX ADMIN — SCOPALAMINE 1 PATCH: 1 PATCH, EXTENDED RELEASE TRANSDERMAL at 07:30

## 2024-03-24 RX ADMIN — SCOPALAMINE 1 PATCH: 1 PATCH, EXTENDED RELEASE TRANSDERMAL at 18:56

## 2024-03-24 RX ADMIN — Medication 3 MILLILITER(S): at 23:37

## 2024-03-24 RX ADMIN — Medication 3 MILLILITER(S): at 06:35

## 2024-03-24 RX ADMIN — Medication 0.5 MILLIGRAM(S): at 19:09

## 2024-03-24 NOTE — PROGRESS NOTE ADULT - SUBJECTIVE AND OBJECTIVE BOX
Patient was seen and examined at bedside. Case discuss with resident. Pt is more alert this morning. Pt stated that his mouth is dry and he was requesting to have some water.     OBJECTIVE:  Vital Signs Last 24 Hrs  T(C): 36.3 (24 Mar 2024 06:05), Max: 36.4 (23 Mar 2024 12:58)  T(F): 97.3 (24 Mar 2024 06:05), Max: 97.6 (23 Mar 2024 12:58)  HR: 89 (24 Mar 2024 06:05) (89 - 106)  BP: 102/59 (24 Mar 2024 06:05) (93/55 - 125/64)  BP(mean): 88 (23 Mar 2024 20:55) (68 - 88)  RR: 18 (24 Mar 2024 06:05) (18 - 18)  SpO2: 98% (24 Mar 2024 06:05) (94% - 100%)    Parameters below as of 24 Mar 2024 06:05  Patient On (Oxygen Delivery Method): nasal cannula      PE: NAD laying in bed ; Pt is more alert today compared to 3/23  coarse b/l breath sounds   Nl S1,S2 no mumur   soft NT/ND + BS   Additional exam as per resident note     CAPILLARY BLOOD GLUCOSE  POCT Blood Glucose.: 118 mg/dL (23 Mar 2024 18:04)  POCT Blood Glucose.: 132 mg/dL (23 Mar 2024 13:15)      acetaminophen     Tablet .. 1000 milliGRAM(s) Oral every 8 hours  albuterol/ipratropium for Nebulization 3 milliLiter(s) Nebulizer every 6 hours  clonazePAM  Tablet 0.5 milliGRAM(s) Oral at bedtime  clonazePAM  Tablet 0.5 milliGRAM(s) Oral every 6 hours PRN  doxazosin 4 milliGRAM(s) Oral at bedtime  finasteride 5 milliGRAM(s) Oral daily  glycopyrrolate Injectable 0.2 milliGRAM(s) IV Push every 6 hours  melatonin 3 milliGRAM(s) Oral at bedtime PRN  morphine  - Injectable 2 milliGRAM(s) IV Push every 4 hours PRN  pantoprazole    Tablet 40 milliGRAM(s) Oral before breakfast  scopolamine 1 mG/72 Hr(s) Patch 1 Patch Transdermal every 72 hours  sodium chloride 0.9%. 1000 milliLiter(s) IV Continuous <Continuous>

## 2024-03-24 NOTE — PROGRESS NOTE ADULT - ASSESSMENT
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 The patient was initially seen with the new diagnosis of NSCLC in 12/2023, but has not received any cancer directed treatment. Pt was made DNR/DNI and comfort care measures only. Pt is awaiting hospice placement. Pt aspirated his food overnight.     #Stage IV squamous cell lung cancer   #Malignant pleural effusion  #Failure to thrive  #Aspiration   - Oncology following   -Pt was made DNR/DNI and comfort care measure only   -Will continue gentle hydration   -Aspiration precautions  -The pt is more alert this morning therefore we will start a puree diet with assistance  -Pt is for home hospice on 3/25.  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 with failure to thrive, discharged to [facility], now admitted with worsening R pleural effusion. The patient was initially seen with the new diagnosis of NSCLC in 12/2023, but has not received any cancer directed treatment. Pt was made DNR/DNI and comfort care measures only. Pt is awaiting hospice placement.     #Stage IV squamous cell lung cancer   #Malignant pleural effusion  #Failure to thrive  #Aspiration   - Oncology following   -Pt was made DNR/DNI and comfort care measure only   -Will continue gentle hydration   -Aspiration precautions  -The pt is more alert this morning therefore we will start a puree diet with assistance  -Pt is for home hospice on 3/25.

## 2024-03-24 NOTE — CHART NOTE - NSCHARTNOTEFT_GEN_A_CORE
PALLIATIVE MEDICINE COORDINATION OF CARE NOTE FOR TORRI BLACK  [  ] ED Trigger   [  ] MICU Trigger     [  x] Consult    Patient last assessed: _3/22/24____  to manage: GOC/AD, Symptoms, and Support was recommended:__3/22/24____    ___40___ Minutes; Start: __0720___  End: _0800___, of non-face-to-face prolonged service provided that relates to (face-to-face) care that has or will occur and ongoing patient management, including one or more of the following:   - Reviewed records from other physicians or other health care professional services, including one or more of the following: other medical records and diagnostic / radiology study results     HPI:  88M PMH squamous cell lung cancer with pleural effusions s/p Pleurx placement prior to this hospitalization, BPH, peripheral neuropathy, and anxiety presenting with progressive dyspnea i/s/o known MPE and IPC. Was recently admitted to [facility] for further evaluation and management of Failure to Thrive. Course was c/b urinary retention needed ruth placement on 2/22. The patient is being followed by pulmonology for the Pleurx drainage, fluid studies were sent and there was concern for infection for which patient was started on Vanc and Zosyn. S/p TPA/Dornase MIST 2 protocol. Was supposed to f/u with pulm, o/p (refused) now presenting with worsening R pleural effusion. Per ED provider patient refused follow up and CHENCHO was able to reach pulm team who recommended increased pleurx drainage to EOD. Despite this change patient has reaccumulated pleural effusion on R (worse than at time of previous discharge). Pulm may require additional pleural fluid studies/thoracentesis in AM. Patient requiring additional O2 supplementation at time of admission.     ED Course:   Vitals T: 100.2F, HR: 98, BP: 109/68, RR: 16, SpO2: 100% on RA      Labs significant for WBC 33.87, hgb 8.7, plt 474, PT/INR/PTT all elevated, Na 134, K 4.1, Cr 0.89 (baseline 0.9), lactate 2,     CXR with worsened R pleural effusion    Of note: most recent CT CAP 02/2024with previously noted approximate 3.5 cm right lower lobe mass now appears as a nonenhancing geographic area of low attenuation/fluid attenuation. A punctate calcification is seen within the lesion, which can be seen on the pretreatment scan. Adjacent focal atelectasis noted surrounding the lesion, likely posttreatment change. New moderate bilateral pleural effusions and right hydropneumothorax with a chest tube tip positioned superiorly and medially, with the tip not in the pneumothorax itself. Multifocal indeterminate right pleural-based nodularity noted. Subcarinal and right hilar lymphadenopathy, however, the lymph nodes are of low attenuation and possible posttreatment change.     EKG: sinus tachycardia with PVCx L axis deviation and RBBB    Interventions: Tylenol 975mg PO x1 in ED    Home meds: acetaminophen 325 mg oral tablet: 2 tab(s) PO q6h PRN, amoxicillin-clavulanate 500 mg-125 mg 500mg PO q8h, finasteride 5 mg PO qd, heparin: 5,000 unit(s) SQ q8h for DVT ppx, KlonoPIN 0.125 mg PO qd for anxiety, Lipitor 10 mg PO qHS, melatonin 3 mg PO qHS PRN for sleep, pantoprazole 40 mg PO qd, terazosin 5 mg PO qHS    (18 Mar 2024 02:14)      - Other: iStop reviewed.    - Other: Medication reviewed.    The patient HAS NOT used PRN's in the last 24h.    MEDICATIONS  (STANDING):    MEDICATIONS  (PRN):      - Other: Advanced directives     Full Code     No documented MOLST form found on [facility]     No documented HCP form found on [facility]     No Living will / POA / Advance directives found on [facility] / [facility].     No documented GOC notes on [facility]    - Other: Coordination/Plan of care     _4___ admissions in 1 year     Current admission LOS: _6__ days     LACE score: _14___ ADVANCE ILLNESS PATIENT.     Patient NOT previously seen by palliative medicine consult service.      Discussed with  Consult request for: " Advanced lung cancer, goals of care discussion   "    Patient is an Advanced Illness patient hence the primary team will need to complete GOC document of any prior GOC discussions that were done during this admission so far as well as information available for Proxy/surrogates/NOK/guardians prior to a palliative contact.

## 2024-03-25 ENCOUNTER — TRANSCRIPTION ENCOUNTER (OUTPATIENT)
Age: 89
End: 2024-03-25

## 2024-03-25 DIAGNOSIS — R64 CACHEXIA: ICD-10-CM

## 2024-03-25 DIAGNOSIS — J69.0 PNEUMONITIS DUE TO INHALATION OF FOOD AND VOMIT: ICD-10-CM

## 2024-03-25 PROCEDURE — 99497 ADVNCD CARE PLAN 30 MIN: CPT | Mod: 25

## 2024-03-25 PROCEDURE — 99233 SBSQ HOSP IP/OBS HIGH 50: CPT | Mod: GC

## 2024-03-25 PROCEDURE — 99233 SBSQ HOSP IP/OBS HIGH 50: CPT

## 2024-03-25 PROCEDURE — 99232 SBSQ HOSP IP/OBS MODERATE 35: CPT | Mod: GC

## 2024-03-25 RX ADMIN — Medication 1000 MILLIGRAM(S): at 21:20

## 2024-03-25 RX ADMIN — ROBINUL 0.2 MILLIGRAM(S): 0.2 INJECTION INTRAMUSCULAR; INTRAVENOUS at 06:56

## 2024-03-25 RX ADMIN — Medication 0.5 MILLIGRAM(S): at 21:19

## 2024-03-25 RX ADMIN — SCOPALAMINE 1 PATCH: 1 PATCH, EXTENDED RELEASE TRANSDERMAL at 07:28

## 2024-03-25 RX ADMIN — Medication 1000 MILLIGRAM(S): at 14:42

## 2024-03-25 RX ADMIN — SCOPALAMINE 1 PATCH: 1 PATCH, EXTENDED RELEASE TRANSDERMAL at 18:33

## 2024-03-25 RX ADMIN — Medication 1 TABLET(S): at 13:46

## 2024-03-25 RX ADMIN — Medication 1000 MILLIGRAM(S): at 01:29

## 2024-03-25 RX ADMIN — ROBINUL 0.2 MILLIGRAM(S): 0.2 INJECTION INTRAMUSCULAR; INTRAVENOUS at 18:33

## 2024-03-25 RX ADMIN — SODIUM CHLORIDE 70 MILLILITER(S): 9 INJECTION INTRAMUSCULAR; INTRAVENOUS; SUBCUTANEOUS at 13:46

## 2024-03-25 RX ADMIN — Medication 3 MILLILITER(S): at 13:45

## 2024-03-25 RX ADMIN — Medication 3 MILLILITER(S): at 06:55

## 2024-03-25 RX ADMIN — ROBINUL 0.2 MILLIGRAM(S): 0.2 INJECTION INTRAMUSCULAR; INTRAVENOUS at 13:45

## 2024-03-25 RX ADMIN — FINASTERIDE 5 MILLIGRAM(S): 5 TABLET, FILM COATED ORAL at 13:46

## 2024-03-25 RX ADMIN — Medication 1000 MILLIGRAM(S): at 22:20

## 2024-03-25 RX ADMIN — Medication 3 MILLIGRAM(S): at 21:20

## 2024-03-25 RX ADMIN — PANTOPRAZOLE SODIUM 40 MILLIGRAM(S): 20 TABLET, DELAYED RELEASE ORAL at 06:55

## 2024-03-25 RX ADMIN — Medication 1000 MILLIGRAM(S): at 13:46

## 2024-03-25 RX ADMIN — Medication 3 MILLILITER(S): at 18:33

## 2024-03-25 NOTE — PROGRESS NOTE ADULT - SUBJECTIVE AND OBJECTIVE BOX
SUBJECTIVE AND OBJECTIVE:  Waxing/waning sensorium  Intermittent shortness of breath  INTERVAL HPI/OVERNIGHT EVENTS:  Patient with concern over aspiration event over the weekend as well as infected pleural effusion,   DNR on chart:   Allergies    No Known Allergies    Intolerances    MEDICATIONS  (STANDING):  acetaminophen     Tablet .. 1000 milliGRAM(s) Oral every 8 hours  albuterol/ipratropium for Nebulization 3 milliLiter(s) Nebulizer every 6 hours  amoxicillin  875 milliGRAM(s)/clavulanate 1 Tablet(s) Oral every 12 hours  clonazePAM  Tablet 0.5 milliGRAM(s) Oral at bedtime  doxazosin 4 milliGRAM(s) Oral at bedtime  finasteride 5 milliGRAM(s) Oral daily  glycopyrrolate Injectable 0.2 milliGRAM(s) IV Push every 6 hours  pantoprazole    Tablet 40 milliGRAM(s) Oral before breakfast  scopolamine 1 mG/72 Hr(s) Patch 1 Patch Transdermal every 72 hours  sodium chloride 0.9%. 1000 milliLiter(s) (70 mL/Hr) IV Continuous <Continuous>    MEDICATIONS  (PRN):  clonazePAM  Tablet 0.5 milliGRAM(s) Oral every 6 hours PRN Anxiety/Agitation  melatonin 3 milliGRAM(s) Oral at bedtime PRN Sleep  morphine  - Injectable 2 milliGRAM(s) IV Push every 4 hours PRN Severe Pain (7 - 10)      ITEMS UNCHECKED ARE NOT PRESENT    PRESENT SYMPTOMS: [ x]Unable to obtain due to poor mentation   Source if other than patient:  [ ]Family   [ ]Team     Pain (Impact on QOL):  none  Location:  Minimal acceptable level (0-10 scale):                   Aggravating factors:  Quality:  Radiation:  Severity (0-10 scale):    Timing:    Dyspnea:                           [ ]Mild [x ]Moderate [ ]Severe  Anxiety:                             [ ]Mild [x ]Moderate [ ]Severe  Fatigue:                             [ ]Mild [ ]Moderate [ x]Severe  Nausea:                             [ ]Mild [ ]Moderate [ ]Severe  Loss of appetite:              [ ]Mild [ ]Moderate [ ]Severe  Constipation:                    [ ]Mild [ ]Moderate [ ]Severe  Grief Present                    [ ] Yes  [ ] No   PAIN AD Score:	  http://geriatrictoolkit.[facility]/cog/painad.pdf (Ctrl + left click to view)    Other Symptoms:  [x ]All other review of systems negative     Karnofsky Performance Score/Palliative Performance Status Version 2: 30        %    http://palliative.info/resource_material/[email or web address].pdf  PHYSICAL EXAM:  Vital Signs Last 24 Hrs  T(C): 36.7 (25 Mar 2024 21:18), Max: 36.7 (25 Mar 2024 21:18)  T(F): 98 (25 Mar 2024 21:18), Max: 98 (25 Mar 2024 21:18)  HR: 115 (25 Mar 2024 21:18) (104 - 115)  BP: 106/59 (25 Mar 2024 21:18) (90/51 - 107/63)  BP(mean): --  RR: 18 (25 Mar 2024 21:18) (17 - 18)  SpO2: 95% (25 Mar 2024 21:18) (95% - 99%)    Parameters below as of 25 Mar 2024 12:48  Patient On (Oxygen Delivery Method): nasal cannula  O2 Flow (L/min): 2   I&O's Summary   GENERAL:  [ ]Alert  [ ]Oriented x   [ x]Lethargic  [x ]Cachexia  [ ]Unarousable  [ ]Verbal  [ ]Non-Verbal  Behavioral:   [ ] Anxiety  [ ] Delirium [ ] Agitation [x ] Other  HEENT:  [ ]Normal   [x ]Dry mouth   [ ]ET Tube/Trach  [ ]Oral lesions  PULMONARY:   [ ]Clear [ ]Tachypnea  [ ]Audible excessive secretions   [ x]Rhonchi        [ ]Right [ ]Left [x ]Bilateral  [ ]Crackles        [ ]Right [ ]Left [ ]Bilateral  [ ]Wheezing     [ ]Right [ ]Left [ ]Bilateral  CARDIOVASCULAR:    [x ]Regular [ ]Irregular [ ]Tachy  [ ]Brandin [ ]Murmur [ ]Other  GASTROINTESTINAL:  [ x]Soft  [ ]Distended   [ x]+BS  [ x]Non tender [ ]Tender  [ ]PEG [ ]OGT/ NGT   Last BM:  GENITOURINARY:  [ ]Normal [x ] Incontinent   [ ]Oliguria/Anuria   [ ]Gerber  MUSCULOSKELETAL:   [ ]Normal   [ ]Weakness  [ ]Bed/Wheelchair bound [ ]Edema  NEUROLOGIC:   [ ]No focal deficits  [x ] Cognitive impairment  [ ] Dysphagia [ ]Dysarthria [ ] Paresis [ ]Other   SKIN:   [ x]Normal   [ ]Pressure ulcer(s)  [ ]Rash    CRITICAL CARE:  [ ] Shock Present  [ ]Septic [ ]Cardiogenic [ ]Neurologic [ ]Hypovolemic  [ ]  Vasopressors [ ]  Inotropes   [ ] Respiratory failure present  [ ] Acute  [ ] Chronic [ ] Hypoxic  [ ] Hypercarbic [ ] Other  [ ] Other organ failure     LABS:            RADIOLOGY & ADDITIONAL STUDIES:    Protein Calorie Malnutrition Present: [ ] yes [ ] no  [ ] PPSV2 < or = 30%  [ ] significant weight loss [ ] poor nutritional intake [ ] anasarca [ ] catabolic state Artificial Nutrition [ ]     REFERRALS:   [ ]Chaplaincy  [ ] Hospice  [ ]Child Life  [ ]Social Work  [ ]Case management [ ]Holistic Therapy   Goals of Care Document:

## 2024-03-25 NOTE — DISCHARGE NOTE PROVIDER - ATTENDING DISCHARGE PHYSICAL EXAMINATION:
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 with failure to thrive, discharged to [facility], now admitted with worsening R pleural effusion. The patient was initially seen with the new diagnosis of NSCLC in 12/2023, but has not received any cancer directed treatment. Pt was made DNR/DNI and comfort care measures only. Pt is awaiting hospice placement. Pt aspirated his food overnight.     #Stage IV squamous cell lung cancer   #gram negative pneumonia   #Malignant/ infected pleural effusion  #Failure to thrive  #Aspiration   - Oncology following   -Pt was made DNR/DNI and comfort care measure only   -Aspiration precautions and comfort feeds   -discharged to [facility] with indefinite augmentin per pulm recs     Physical exam:  GENERAL: NAD, lying in bed comfortably, cachetic appearing  HEAD:  Atraumatic, Normocephalic  EYES: EOMI, PERRLA, conjunctiva and sclera clear  ENT: dry mucous membranes  NECK: Supple, No JVD  CHEST/LUNG: Coarse bs b/l , dec in RLL  HEART: Regular rate and rhythm; S1+ S2+   ABDOMEN: Bowel sounds present; Soft, Nontender, Nondistended   EXTREMITIES:  2+ Peripheral Pulses, brisk capillary refill.   NERVOUS SYSTEM:  Alert & Oriented to self and place  MSK: FROM all 4 extremities weak strength   SKIN: age appropriate skin

## 2024-03-25 NOTE — PROGRESS NOTE ADULT - REASON FOR ADMISSION
Worsening R malignant pleural effusion

## 2024-03-25 NOTE — PROGRESS NOTE ADULT - PROVIDER SPECIALTY LIST ADULT
Internal Medicine
Heme/Onc
Heme/Onc
Hospitalist
Internal Medicine
Palliative Care
Pulmonology
Rehab Medicine
Palliative Care
Internal Medicine

## 2024-03-25 NOTE — PROGRESS NOTE ADULT - ASSESSMENT
87 yo M w/ Squamous Cell lung ca (Stage 4)  not on active chemo or radiation, right sided pleural effusion s/p indwelling pleural catheter since jan 2024 who presents w/ worsening dyspnea, WBC 33 up from 29 at discharge, and increasing pleural fluid driange at his subacute rehab. Pulmonary is consulted for concern of increasing R pleural effusion in the setting of Pleurx.     WBC 34  2/28 - pleural fluid cyto +malignant cells  3/18 - pleural fluid analysis: LDH >2500, glucose low, neutrophil predominant 97% cell count w/ 13k wbc, 70k rbc, pH 7.57, total protein 2.2, albumin 1, glucose <2, cholesterol 27. gram stain negative. culture NGTD    # Squamous Cell Lung Ca, stage 4  # Recurrent Right Pleural Effusion s/p IPC 1/2024  - presents w/ worsening dyspnea, increased needs for drainage at subacute rehab of his pleural effusion, and leukocytosis of 33  - fluid drained 200 cc out on 3/18, sent for analysis above, neutrophil predominant w/ high LDH and low glucose. c/f infection in pleural space  PLAN  - c/w abx extended period, atleast 4-6 weeks of PO augmentin  - palliative care on board, planning for hospice and comfort focused care  - will recommend PRN pleurx drainages for comfort (2 times per week w/ premedication for pain)      S/D/E with Dr. Lawrence

## 2024-03-25 NOTE — DISCHARGE NOTE PROVIDER - NSDCMRMEDTOKEN_GEN_ALL_CORE_FT
acetaminophen 325 mg oral tablet: 2 tab(s) orally every 6 hours As needed Temp greater or equal to 38C (100.4F), Mild Pain (1 - 3)  amoxicillin-clavulanate 500 mg-125 mg oral tablet: 1 tab(s) orally every 12 hours  doxazosin 4 mg oral tablet: 1 tab(s) orally once a day (at bedtime)  finasteride 5 mg oral tablet: 1 tab(s) orally once a day  heparin: 5,000 unit(s) subcutaneous every 8 hours 5000 units Subq every 8 hours for prophylaxis  KlonoPIN 0.125 mg oral tablet: 1 tab(s) orally once a day as needed for  anxiety  KlonoPIN 0.5 mg oral tablet: 1 tab(s) orally once a day (at bedtime) MDD: 0.5 mg  Lipitor 10 mg oral tablet: 1 tab(s) orally once a day  melatonin 3 mg oral tablet: 1 tab(s) orally once a day (at bedtime) As needed Sleep  morphine 10 mg/5 mL oral solution: 5 milliliter(s) orally every 4 hours as needed for severe pain or shortness of breath MDD: 20 mL  pantoprazole 40 mg oral delayed release tablet: 1 tab(s) orally once a day (before a meal)   acetaminophen 325 mg oral tablet: 2 tab(s) orally every 6 hours As needed Temp greater or equal to 38C (100.4F), Mild Pain (1 - 3)  amoxicillin-clavulanate 875 mg-125 mg oral tablet: 1 tab(s) orally every 12 hours  clonazePAM 0.5 mg oral tablet: 1 tab(s) orally once a day (at bedtime)  clonazePAM 0.5 mg oral tablet: 1 tab(s) orally every 6 hours As needed Anxiety/Agitation  doxazosin 4 mg oral tablet: 1 tab(s) orally once a day (at bedtime)  finasteride 5 mg oral tablet: 1 tab(s) orally once a day  glycopyrrolate 0.2 mg/mL injectable solution: 0.2 milligram(s) injectable every 6 hours  ipratropium-albuterol 0.5 mg-2.5 mg/3 mL inhalation solution: 3 milliliter(s) inhaled every 6 hours  melatonin 3 mg oral tablet: 1 tab(s) orally once a day (at bedtime) As needed Sleep  morphine 2 mg/mL-NaCl 0.9% intravenous solution: 2 milligram(s) intravenous every 4 hours as needed for  severe pain  pantoprazole 40 mg oral delayed release tablet: 1 tab(s) orally once a day (before a meal)  scopolamine: 1 patch every 24 hours please apply scopolamine patch as needed

## 2024-03-25 NOTE — PROGRESS NOTE ADULT - CONVERSATION/DISCUSSION
Diagnosis/Prognosis/MOLST Discussed
Diagnosis/Prognosis/MOLST Discussed/Treatment Options/Hospice Referral

## 2024-03-25 NOTE — DISCHARGE NOTE PROVIDER - HOSPITAL COURSE
88M PMH squamous cell lung cancer, BPH, peripheral neuropathy, and anxiety presenting with progressive dyspnea due to worsening pleural effusion. Pulm, heme/onc, and CTSx consulted on admission. Pt poor candidate for further treatment or chemo/radiation. Evaluated by palliative, pt now comfort care, pending home hospice.    Problem List/Main Diagnoses:    #Squamous cell lung cancer.   Diagnosed 10/2023, c/b worsening R pleural effusion. s/p bronchoscopy, pleurx placement, and thoracentesis outpatient. EBUS FNA bx and BAL 11/2023 +malignant cells. RLL bx +SCC. Seen by oncology 12/2023 and was determined to be a good candidate for chemoradiation at that time however did not receive tx due to large pleural effusion. Admitted 2/2024 due to FTT, progressive dyspnea, fatigue, weight loss, night sweats. s/p MIST 2 protocol last admission and was discharged to [facility]. Presented again 3/18 with worsening sx despite treatment. Dr Hubbard notified of pt admission, determined that pt was not a candidate for tx or chemo. Also consulted pulm and CTSx for further intervention, however given progression of disease pt is likely poor candidate. On further discussion of GOC w/ patient and sisters at bedside, states that he would like to be comfortable. Palliative consulted for hospice evaluation. s/p family meeting at bedside with patient and his two sisters: goal is symptom-directed care, prioritize comfort.    - tylenol 1g q8 standing  - klonopin 0.5 at bedtime standing, klonopin 0.5 q6 for anxiety/agitation  - morphine 2mg q4 PRN for severe pain or RR>22  - robinul 0.2 q6 standing for cough  - pureed diet with strict aspiration precautions  - intermittent pleurx drainage for comfort.    #Exudative pleural effusion.   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 Now w/ worsening effusion despite treatment.  - c/w q6 duonebs, nasal cannula  - intermittent pleurx drainage for comfort    Patient was discharged to: home hospice    New medications:   Changes to old medications:  Medications that were stopped:    Items to follow up as outpatient:    Physical exam at the time of discharge:       LABS & STUDIES:  SARS-CoV-2: Randy (18 Mar 2024 08:51)   88M PMH squamous cell lung cancer, BPH, peripheral neuropathy, and anxiety presenting with progressive dyspnea due to worsening pleural effusion. Pulm, heme/onc, and CTSx consulted on admission. Pt poor candidate for further treatment or chemo/radiation. Evaluated by palliative, pt now comfort care, pending home hospice.    Problem List/Main Diagnoses:    #Squamous cell lung cancer.   Diagnosed 10/2023, c/b worsening R pleural effusion. s/p bronchoscopy, pleurx placement, and thoracentesis outpatient. EBUS FNA bx and BAL 11/2023 +malignant cells. RLL bx +SCC. Seen by oncology 12/2023 and was determined to be a good candidate for chemoradiation at that time however did not receive tx due to large pleural effusion. Admitted 2/2024 due to FTT, progressive dyspnea, fatigue, weight loss, night sweats. s/p MIST 2 protocol last admission and was discharged to [facility]. Presented again 3/18 with worsening sx despite treatment. Dr Hubbard notified of pt admission, determined that pt was not a candidate for tx or chemo. Also consulted pulm and CTSx for further intervention, however given progression of disease pt is likely poor candidate. On further discussion of GOC w/ patient and sisters at bedside, states that he would like to be comfortable. Palliative consulted for hospice evaluation. s/p family meeting at bedside with patient and his two sisters: goal is symptom-directed care, prioritize comfort.    - tylenol 1g q8 standing  - klonopin 0.5 at bedtime standing, klonopin 0.5 q6 for anxiety/agitation  - morphine 2mg q4 PRN for severe pain or RR>22  - robinul 0.2 q6 standing for cough  - pureed diet with strict aspiration precautions  - intermittent pleurx drainage for comfort.    #Exudative pleural effusion.   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 Now w/ worsening effusion despite treatment.  - c/w q6 duonebs, nasal cannula  - intermittent pleurx drainage for comfort    Patient was discharged to: home hospice    New medications:   Changes to old medications:  Medications that were stopped:    Items to follow up as outpatient:    Physical exam at the time of discharge:   Vital Signs Last 24 Hrs  T(C): 36.1 (25 Mar 2024 06:38), Max: 36.6 (24 Mar 2024 21:31)  T(F): 97 (25 Mar 2024 06:38), Max: 97.9 (24 Mar 2024 21:31)  HR: 106 (25 Mar 2024 06:38) (106 - 114)  RR: 17 (25 Mar 2024 06:38) (17 - 18)  SpO2: 98% (25 Mar 2024 06:38) (97% - 98%)  O2 Parameters below as of 25 Mar 2024 06:38  Patient On (Oxygen Delivery Method): nasal cannula    General: NAD  Head: NC/AT; MMM; PERRL; EOMI;  Neck: Supple; no JVD  Respiratory: CTAB; no wheezes/rales/rhonchi  Cardiovascular: Regular rhythm/rate; S1/S2+, no murmurs, rubs gallops   Gastrointestinal: Soft; NTND; bowel sounds normal and present  Extremities: WWP; no edema/cyanosis  Neurological: A&Ox3, CNII-XII grossly intact; no obvious focal deficits       88M PMH squamous cell lung cancer, BPH, peripheral neuropathy, and anxiety presenting with progressive dyspnea due to worsening pleural effusion. Pulm, heme/onc, and CTSx consulted on admission. Pt poor candidate for further treatment or chemo/radiation. Evaluated by palliative, pt now comfort care, pending inpatient hospice.    Problem List/Main Diagnoses:    #Squamous cell lung cancer.   Diagnosed 10/2023, c/b worsening R pleural effusion. s/p bronchoscopy, pleurx placement, and thoracentesis outpatient. EBUS FNA bx and BAL 11/2023 +malignant cells. RLL bx +SCC. Seen by oncology 12/2023 and was determined to be a good candidate for chemoradiation at that time however did not receive tx due to large pleural effusion. Admitted 2/2024 due to FTT, progressive dyspnea, fatigue, weight loss, night sweats. s/p MIST 2 protocol last admission and was discharged to [facility]. Presented again 3/18 with worsening sx despite treatment. Dr Hubbard notified of pt admission, determined that pt was not a candidate for tx or chemo. Also consulted pulm and CTSx for further intervention, however given progression of disease pt is likely poor candidate. On further discussion of GOC w/ patient and sisters at bedside, states that he would like to be comfortable. Palliative consulted for hospice evaluation. s/p family meeting at bedside with patient and his two sisters: goal is symptom-directed care, prioritize comfort.    - tylenol 1g q8 standing  - klonopin 0.5 at bedtime standing, klonopin 0.5 q6 for anxiety/agitation  - morphine 2mg q4 PRN for severe pain or RR>22  - robinul 0.2 q6 standing for cough  - pureed diet with strict aspiration precautions  - intermittent pleurx drainage for comfort.    #Exudative pleural effusion.   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 Now w/ worsening effusion despite treatment.  - c/w q6 duonebs, nasal cannula  - intermittent pleurx drainage for comfort    Patient was discharged to: home hospice    New medications:   Changes to old medications:  Medications that were stopped:    Items to follow up as outpatient:    Physical exam at the time of discharge:   Vital Signs Last 24 Hrs  T(C): 36.1 (25 Mar 2024 06:38), Max: 36.6 (24 Mar 2024 21:31)  T(F): 97 (25 Mar 2024 06:38), Max: 97.9 (24 Mar 2024 21:31)  HR: 106 (25 Mar 2024 06:38) (106 - 114)  RR: 17 (25 Mar 2024 06:38) (17 - 18)  SpO2: 98% (25 Mar 2024 06:38) (97% - 98%)  O2 Parameters below as of 25 Mar 2024 06:38  Patient On (Oxygen Delivery Method): nasal cannula    General: NAD  Head: NC/AT; MMM; PERRL; EOMI;  Neck: Supple; no JVD  Respiratory: CTAB; no wheezes/rales/rhonchi  Cardiovascular: Regular rhythm/rate; S1/S2+, no murmurs, rubs gallops   Gastrointestinal: Soft; NTND; bowel sounds normal and present  Extremities: WWP; no edema/cyanosis  Neurological: A&Ox3, CNII-XII grossly intact; no obvious focal deficits

## 2024-03-25 NOTE — CHART NOTE - NSCHARTNOTEFT_GEN_A_CORE
Drained 200 cc from pleurx. Patient to get pleurx drainages Mondays and Thursdays for comfort if finding benefit when discharged w/ home hospice. pretreatment with pain meds if needed.    Raymond Ortiz  Pulmonary Fellow

## 2024-03-25 NOTE — PROGRESS NOTE ADULT - ATTENDING COMMENTS
advanced ca with likely infected pleural space.  Abx indefinitely, oral form. If possible would rec daily IPC drainage, but if this is causing too much aggrevation and discomfort for patient, would drain based on dyspnea as he is entering comfort care now.

## 2024-03-25 NOTE — DISCHARGE NOTE PROVIDER - NSDCCPCAREPLAN_GEN_ALL_CORE_FT
PRINCIPAL DISCHARGE DIAGNOSIS  Diagnosis: Squamous cell lung cancer  Assessment and Plan of Treatment: You have a prior diagnosis of squamous cell lung cancer. During your hospitalization, you were seen by a multidisciplinary team for management of your symptoms. It was determined by your oncologist that you are not a candidate for further treatment. After discussion with our palliative team, the decision was made for inpatient hospice. The goal is for you to have comfort directed care. Part of this means treating your pleural effusion, or fluid in your lung, so that you can breath comfortably. This also means that you are able to take anxiety medications such as klonopin and pain medications such as morphine to feel comfortable.      SECONDARY DISCHARGE DIAGNOSES  Diagnosis: Exudative pleural effusion  Assessment and Plan of Treatment: You presented with an increased amount of fluid in your right lung, it was tested and found to be infected. You were initially treated with antibiotics. You were also seen by our team of pulmonologists who recommend intermittent pleurx catheter drainage for comfort as well as oral antibiotics for at least 4-6 weeks. Please continue to take augmentin twice a day for 6 weeks.

## 2024-03-25 NOTE — DISCHARGE NOTE PROVIDER - NSDCFUSCHEDAPPT_GEN_ALL_CORE_FT
Giles Lawrence  [facility]  [address]  Scheduled Appointment: 04/08/2024

## 2024-03-25 NOTE — PROGRESS NOTE ADULT - PROBLEM SELECTOR PLAN 1
Diagnosed 10/2023, c/b worsening R pleural effusion. s/p bronchoscopy, pleurx placement, and thoracentesis outpatient. EBUS FNA bx and BAL 11/2023 +malignant cells. RLL bx +SCC. Seen by oncology 12/2023 and was determined to be a good candidate for chemoradiation at that time however did not receive tx due to large pleural effusion. Admitted 2/2024 due to FTT, progressive dyspnea, fatigue, weight loss, night sweats. s/p MIST 2 protocol last admission and was discharged to [facility]. Presented again 3/18 with worsening sx despite treatment.     Dr Hubbard notified of pt admission, determined that pt was not a candidate for tx or chemo. Also consulted pulm and CTSx for further intervention, however given progression of disease pt is likely poor candidate. On further discussion of GOC w/ patient and sisters at bedside, states that he would like to be comfortable. Palliative consulted for hospice evaluation. s/p family meeting at bedside with patient and his two sisters: goal is symptom-directed care, prioritize comfort.    - pending home hospice vs [facility]  - comfort care, MEWS exempt  - tylenol 1g q8 standing  - klonopin 0.5 at bedtime standing, klonopin 0.5 q6 for anxiety/agitation  - morphine 2mg q4 PRN for severe pain or RR>22  - robinul 0.2 q6 standing for cough  - gentle hydration D5LR @30cc/hr x6 hours  - NPO due to aspiration, moist sponges to mouth  - intermittent pleurx drainage for comfort

## 2024-03-25 NOTE — PROGRESS NOTE ADULT - ATTENDING COMMENTS
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 The patient was initially seen with the new diagnosis of NSCLC in 12/2023, but has not received any cancer directed treatment. Pt was made DNR/DNI and comfort care measures only. Pt is awaiting hospice placement. Pt aspirated his food overnight.     #Stage IV squamous cell lung cancer   #Malignant pleural effusion  #Failure to thrive  #Aspiration   - Oncology following   -Pt was made DNR/DNI and comfort care measure only   -comfort feeds and plan for IPU/ vs [facility]   -Aspiration precautions  -When pt is more alert then will resume a puree diet with assistance  -Pt is for home hospice on 3/25

## 2024-03-25 NOTE — PROGRESS NOTE ADULT - NS ATTEST RISK PROBLEM GEN_ALL_CORE FT
#Stage IV squamous cell lung cancer   #Malignant pleural effusion  #Failure to thrive  #Aspiration
#Stage IV squamous cell lung cancer   #Malignant pleural effusion  #Failure to thrive  #Aspiration
Chronic Illness With Severe Exacerbation/Progression  Decision Made To Not Resuscitate (DNR)  Decision Made To De-escalate Care Due To Poor Prognosis  Prescription Of Parenteral Controlled Substances

## 2024-03-25 NOTE — PROGRESS NOTE ADULT - SUBJECTIVE AND OBJECTIVE BOX
O/N Events:    Subjective/ROS: Patient seen and examined at bedside.     Denies Fever/Chills, HA, CP, SOB, n/v, changes in bowel/urinary habits.  12pt ROS otherwise negative.    VITALS  Vital Signs Last 24 Hrs  T(C): 36.1 (25 Mar 2024 06:38), Max: 36.6 (24 Mar 2024 21:31)  T(F): 97 (25 Mar 2024 06:38), Max: 97.9 (24 Mar 2024 21:31)  HR: 106 (25 Mar 2024 06:38) (106 - 114)  BP: 90/51 (25 Mar 2024 06:38) (90/51 - 107/63)  BP(mean): --  RR: 17 (25 Mar 2024 06:38) (17 - 18)  SpO2: 98% (25 Mar 2024 06:38) (97% - 98%)    Parameters below as of 25 Mar 2024 06:38  Patient On (Oxygen Delivery Method): nasal cannula        CAPILLARY BLOOD GLUCOSE          PHYSICAL EXAM  General: NAD  Head: NC/AT; MMM; PERRL; EOMI;  Neck: Supple; no JVD  Respiratory: CTAB; no wheezes/rales/rhonchi  Cardiovascular: Regular rhythm/rate; S1/S2+, no murmurs, rubs gallops   Gastrointestinal: Soft; NTND; bowel sounds normal and present  Extremities: WWP; no edema/cyanosis  Neurological: A&Ox3, CNII-XII grossly intact; no obvious focal deficits    MEDICATIONS  (STANDING):  acetaminophen     Tablet .. 1000 milliGRAM(s) Oral every 8 hours  albuterol/ipratropium for Nebulization 3 milliLiter(s) Nebulizer every 6 hours  clonazePAM  Tablet 0.5 milliGRAM(s) Oral at bedtime  doxazosin 4 milliGRAM(s) Oral at bedtime  finasteride 5 milliGRAM(s) Oral daily  glycopyrrolate Injectable 0.2 milliGRAM(s) IV Push every 6 hours  pantoprazole    Tablet 40 milliGRAM(s) Oral before breakfast  scopolamine 1 mG/72 Hr(s) Patch 1 Patch Transdermal every 72 hours  sodium chloride 0.9%. 1000 milliLiter(s) (70 mL/Hr) IV Continuous <Continuous>    MEDICATIONS  (PRN):  clonazePAM  Tablet 0.5 milliGRAM(s) Oral every 6 hours PRN Anxiety/Agitation  melatonin 3 milliGRAM(s) Oral at bedtime PRN Sleep  morphine  - Injectable 2 milliGRAM(s) IV Push every 4 hours PRN Severe Pain (7 - 10)      No Known Allergies      LABS                      IMAGING/EKG/ETC   O/N Events: MAYANK    Subjective/ROS: Patient seen and examined at bedside. On NC, w/ significant cough, appears comfortable otherwise, would like to drink water.    VITALS  Vital Signs Last 24 Hrs  T(C): 36.1 (25 Mar 2024 06:38), Max: 36.6 (24 Mar 2024 21:31)  T(F): 97 (25 Mar 2024 06:38), Max: 97.9 (24 Mar 2024 21:31)  HR: 106 (25 Mar 2024 06:38) (106 - 114)  BP: 90/51 (25 Mar 2024 06:38) (90/51 - 107/63)  BP(mean): --  RR: 17 (25 Mar 2024 06:38) (17 - 18)  SpO2: 98% (25 Mar 2024 06:38) (97% - 98%)    Parameters below as of 25 Mar 2024 06:38  Patient On (Oxygen Delivery Method): nasal cannula        CAPILLARY BLOOD GLUCOSE          PHYSICAL EXAM  General: NAD  Head: mucous membranes dry  Neck: Supple; no JVD  Respiratory: Coarse breath sounds  Cardiovascular: Regular rhythm/rate; S1/S2+, no murmurs, rubs gallops   Gastrointestinal: Soft; NTND; bowel sounds normal and present  Extremities: WWP; no edema/cyanosis  Neurological: A&Ox3    MEDICATIONS  (STANDING):  acetaminophen     Tablet .. 1000 milliGRAM(s) Oral every 8 hours  albuterol/ipratropium for Nebulization 3 milliLiter(s) Nebulizer every 6 hours  clonazePAM  Tablet 0.5 milliGRAM(s) Oral at bedtime  doxazosin 4 milliGRAM(s) Oral at bedtime  finasteride 5 milliGRAM(s) Oral daily  glycopyrrolate Injectable 0.2 milliGRAM(s) IV Push every 6 hours  pantoprazole    Tablet 40 milliGRAM(s) Oral before breakfast  scopolamine 1 mG/72 Hr(s) Patch 1 Patch Transdermal every 72 hours  sodium chloride 0.9%. 1000 milliLiter(s) (70 mL/Hr) IV Continuous <Continuous>    MEDICATIONS  (PRN):  clonazePAM  Tablet 0.5 milliGRAM(s) Oral every 6 hours PRN Anxiety/Agitation  melatonin 3 milliGRAM(s) Oral at bedtime PRN Sleep  morphine  - Injectable 2 milliGRAM(s) IV Push every 4 hours PRN Severe Pain (7 - 10)      No Known Allergies      LABS                      IMAGING/EKG/ETC

## 2024-03-25 NOTE — PROGRESS NOTE ADULT - PROBLEM SELECTOR PLAN 4
- Patient with stage IV lung cancer.  - Worsening symptomatology.  - DNR/DNI.  - Decision made for transition to [facility] tomorrow.   - Patient may be discharged on IV opiates.

## 2024-03-25 NOTE — PROGRESS NOTE ADULT - PROBLEM SELECTOR PLAN 2
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 s/p tpa/dornase MIST 2 protocol, discharged to [facility] w/ Abx. Pleural studies c/f complicated parapneumonic effusion. Showing elevated LDH >2500, neutrophil predominance, low glucose c/f persistent infection despite tx.  - pending hospice/[facility] vs inpatient hospitation  - c/w q6 duidris, NC  - life-long augmentin per pulm

## 2024-03-25 NOTE — PROGRESS NOTE ADULT - PROBLEM SELECTOR PLAN 1
- Stage IV metastatic disease.  - No longer a candidate for disease targeted therapies.  - Prognosis is poor.  - Patient is now awaiting transfer to [facility].

## 2024-03-25 NOTE — PROGRESS NOTE ADULT - SUBJECTIVE AND OBJECTIVE BOX
PULMONARY CONSULT SERVICE FOLLOW-UP NOTE    INTERVAL HPI:  Reviewed chart and overnight events; patient seen and examined. He denies complaints but reports thirst. He is minimally communicating but denies pain.    MEDICATIONS:  Pulmonary:  albuterol/ipratropium for Nebulization 3 milliLiter(s) Nebulizer every 6 hours    Antimicrobials:  amoxicillin  875 milliGRAM(s)/clavulanate 1 Tablet(s) Oral every 12 hours    Anticoagulants:    Cardiac:  doxazosin 4 milliGRAM(s) Oral at bedtime      Allergies    No Known Allergies    Intolerances        Vital Signs Last 24 Hrs  T(C): 36.4 (25 Mar 2024 12:48), Max: 36.6 (24 Mar 2024 21:31)  T(F): 97.6 (25 Mar 2024 12:48), Max: 97.9 (24 Mar 2024 21:31)  HR: 104 (25 Mar 2024 12:48) (104 - 114)  BP: 95/64 (25 Mar 2024 12:48) (90/51 - 107/63)  BP(mean): --  RR: 18 (25 Mar 2024 12:48) (17 - 18)  SpO2: 99% (25 Mar 2024 12:48) (97% - 99%)    Parameters below as of 25 Mar 2024 12:48  Patient On (Oxygen Delivery Method): nasal cannula  O2 Flow (L/min): 2          PHYSICAL EXAM:  Constitutional: chronically ill-appearing, in no apparent respiratory distress  HEENT: NC/AT; PERRL, anicteric sclera; moist mucous membranes  Neck: supple, no JVD or LAD appreciated  Cardiovascular: +S1/S2, Regular rate and rhythm, no murmurs appreciated   Respiratory: Clear breath sounds bilaterally. No wheezes, rhonchi or rales   Gastrointestinal: soft, non-tender, non-distended. Normoactive bowel sounds.   Extremities: Le edema +, no cyanosis or clubbing  Vascular: 2+ radial pulses B/L  Neurological: somnolent, arousable, disoriented     LABS:      no new labs    RADIOLOGY & ADDITIONAL STUDIES:    no new imaging

## 2024-03-25 NOTE — PROGRESS NOTE ADULT - ASSESSMENT
88 M with squamous cell carcinoma, shortness of breath, cachexia, malignant pleural effusion, functional quadriplegia, encounter for palliative care.

## 2024-03-25 NOTE — PROGRESS NOTE ADULT - CONVERSATION DETAILS
Significant discussion about goals of care, advanced directives, de-escalation of current interventions and prognosis.   Although initiation attempts were to try to get patient to sisters home in [place], deterioration over the weekend has led to a transition of goals. Patient will now be transferred to [facility] tomorrow.   DNR/DNI.  Comfort care.   MEWS exempt.
Reviewed patient's hospital course and interval developments. Discussed advanced directives including code status, HCP completion, and hospice eligibility. Patient and sisters are understanding that he is a poor candidate for DMT and is not being offered treatment for his cancer. Patient states he would like to prioritize his symptom management. In agreement with foregoing interventions that will not improve his symptom burden. DNR/DNI, CMO. Introduced the role of hospice services and delineated the benefits provided. Differentiated inpatient vs home hospice and explained the intended philosophy of care. Patient is appropriate for home hospice at this time and amenable to referral. Patient is unable to have home hospice in [facility] due to lack of support but his sisters would like to explore referral in [place] at their homes. Alternatively, patient would be eligible for [facility] for end of life care. Will send both referrals and continue conversation with patient and family.

## 2024-03-26 VITALS
SYSTOLIC BLOOD PRESSURE: 108 MMHG | OXYGEN SATURATION: 97 % | RESPIRATION RATE: 18 BRPM | DIASTOLIC BLOOD PRESSURE: 66 MMHG | HEART RATE: 85 BPM | TEMPERATURE: 98 F

## 2024-03-26 PROCEDURE — 0225U NFCT DS DNA&RNA 21 SARSCOV2: CPT

## 2024-03-26 PROCEDURE — 83615 LACTATE (LD) (LDH) ENZYME: CPT

## 2024-03-26 PROCEDURE — 83605 ASSAY OF LACTIC ACID: CPT

## 2024-03-26 PROCEDURE — 87040 BLOOD CULTURE FOR BACTERIA: CPT

## 2024-03-26 PROCEDURE — 97161 PT EVAL LOW COMPLEX 20 MIN: CPT

## 2024-03-26 PROCEDURE — 71045 X-RAY EXAM CHEST 1 VIEW: CPT

## 2024-03-26 PROCEDURE — 87640 STAPH A DNA AMP PROBE: CPT

## 2024-03-26 PROCEDURE — 36415 COLL VENOUS BLD VENIPUNCTURE: CPT

## 2024-03-26 PROCEDURE — 93005 ELECTROCARDIOGRAM TRACING: CPT

## 2024-03-26 PROCEDURE — 82610 CYSTATIN C: CPT

## 2024-03-26 PROCEDURE — 84100 ASSAY OF PHOSPHORUS: CPT

## 2024-03-26 PROCEDURE — 87075 CULTR BACTERIA EXCEPT BLOOD: CPT

## 2024-03-26 PROCEDURE — 85610 PROTHROMBIN TIME: CPT

## 2024-03-26 PROCEDURE — 85025 COMPLETE CBC W/AUTO DIFF WBC: CPT

## 2024-03-26 PROCEDURE — 82962 GLUCOSE BLOOD TEST: CPT

## 2024-03-26 PROCEDURE — 87899 AGENT NOS ASSAY W/OPTIC: CPT

## 2024-03-26 PROCEDURE — 87205 SMEAR GRAM STAIN: CPT

## 2024-03-26 PROCEDURE — 87449 NOS EACH ORGANISM AG IA: CPT

## 2024-03-26 PROCEDURE — 94640 AIRWAY INHALATION TREATMENT: CPT

## 2024-03-26 PROCEDURE — 86900 BLOOD TYPING SEROLOGIC ABO: CPT

## 2024-03-26 PROCEDURE — 84311 SPECTROPHOTOMETRY: CPT

## 2024-03-26 PROCEDURE — 84157 ASSAY OF PROTEIN OTHER: CPT

## 2024-03-26 PROCEDURE — 82042 OTHER SOURCE ALBUMIN QUAN EA: CPT

## 2024-03-26 PROCEDURE — 80053 COMPREHEN METABOLIC PANEL: CPT

## 2024-03-26 PROCEDURE — 82945 GLUCOSE OTHER FLUID: CPT

## 2024-03-26 PROCEDURE — 87641 MR-STAPH DNA AMP PROBE: CPT

## 2024-03-26 PROCEDURE — 86850 RBC ANTIBODY SCREEN: CPT

## 2024-03-26 PROCEDURE — 99233 SBSQ HOSP IP/OBS HIGH 50: CPT

## 2024-03-26 PROCEDURE — 85730 THROMBOPLASTIN TIME PARTIAL: CPT

## 2024-03-26 PROCEDURE — 89051 BODY FLUID CELL COUNT: CPT

## 2024-03-26 PROCEDURE — 83986 ASSAY PH BODY FLUID NOS: CPT

## 2024-03-26 PROCEDURE — 99239 HOSP IP/OBS DSCHRG MGMT >30: CPT

## 2024-03-26 PROCEDURE — 81001 URINALYSIS AUTO W/SCOPE: CPT

## 2024-03-26 PROCEDURE — 86901 BLOOD TYPING SEROLOGIC RH(D): CPT

## 2024-03-26 PROCEDURE — 99285 EMERGENCY DEPT VISIT HI MDM: CPT | Mod: 25

## 2024-03-26 PROCEDURE — 80048 BASIC METABOLIC PNL TOTAL CA: CPT

## 2024-03-26 PROCEDURE — 87070 CULTURE OTHR SPECIMN AEROBIC: CPT

## 2024-03-26 PROCEDURE — 83735 ASSAY OF MAGNESIUM: CPT

## 2024-03-26 RX ORDER — SCOPALAMINE 1 MG/3D
1 PATCH, EXTENDED RELEASE TRANSDERMAL
Qty: 0 | Refills: 0 | DISCHARGE
Start: 2024-03-26

## 2024-03-26 RX ORDER — MORPHINE SULFATE 50 MG/1
2 CAPSULE, EXTENDED RELEASE ORAL
Qty: 0 | Refills: 0 | DISCHARGE
Start: 2024-03-26

## 2024-03-26 RX ORDER — IPRATROPIUM/ALBUTEROL SULFATE 18-103MCG
3 AEROSOL WITH ADAPTER (GRAM) INHALATION
Qty: 0 | Refills: 0 | DISCHARGE
Start: 2024-03-26

## 2024-03-26 RX ORDER — ATORVASTATIN CALCIUM 80 MG/1
1 TABLET, FILM COATED ORAL
Refills: 0 | DISCHARGE

## 2024-03-26 RX ORDER — LANOLIN ALCOHOL/MO/W.PET/CERES
1 CREAM (GRAM) TOPICAL
Qty: 0 | Refills: 0 | DISCHARGE
Start: 2024-03-26

## 2024-03-26 RX ORDER — CLONAZEPAM 1 MG
1 TABLET ORAL
Qty: 0 | Refills: 0 | DISCHARGE
Start: 2024-03-26

## 2024-03-26 RX ORDER — ROBINUL 0.2 MG/ML
0.2 INJECTION INTRAMUSCULAR; INTRAVENOUS
Qty: 0 | Refills: 0 | DISCHARGE
Start: 2024-03-26

## 2024-03-26 RX ORDER — PANTOPRAZOLE SODIUM 20 MG/1
1 TABLET, DELAYED RELEASE ORAL
Qty: 0 | Refills: 0 | DISCHARGE
Start: 2024-03-26

## 2024-03-26 RX ORDER — CLONAZEPAM 1 MG
1 TABLET ORAL
Refills: 0 | DISCHARGE

## 2024-03-26 RX ADMIN — ROBINUL 0.2 MILLIGRAM(S): 0.2 INJECTION INTRAMUSCULAR; INTRAVENOUS at 00:27

## 2024-03-26 RX ADMIN — Medication 3 MILLILITER(S): at 06:14

## 2024-03-26 RX ADMIN — ROBINUL 0.2 MILLIGRAM(S): 0.2 INJECTION INTRAMUSCULAR; INTRAVENOUS at 06:14

## 2024-03-26 RX ADMIN — Medication 1000 MILLIGRAM(S): at 06:14

## 2024-03-26 RX ADMIN — SCOPALAMINE 1 PATCH: 1 PATCH, EXTENDED RELEASE TRANSDERMAL at 07:39

## 2024-03-26 RX ADMIN — Medication 1000 MILLIGRAM(S): at 07:00

## 2024-03-26 RX ADMIN — Medication 3 MILLILITER(S): at 00:28

## 2024-03-26 RX ADMIN — PANTOPRAZOLE SODIUM 40 MILLIGRAM(S): 20 TABLET, DELAYED RELEASE ORAL at 06:14

## 2024-03-26 RX ADMIN — Medication 1 TABLET(S): at 00:48

## 2024-03-26 NOTE — CHART NOTE - NSCHARTNOTEFT_GEN_A_CORE
Progress Note:   · Provider Specialty	Palliative Care    Reason for Admission:    Reason for Admission:  · Reason for Admission	Worsening R malignant pleural effusion      · Subjective and Objective:   SUBJECTIVE AND OBJECTIVE:  Waxing/waning sensorium  Intermittent shortness of breath  INTERVAL HPI/OVERNIGHT EVENTS:  Patient with concern over aspiration event over the weekend as well as infected pleural effusion,   DNR on chart:   Allergies    No Known Allergies    Intolerances    MEDICATIONS  (STANDING):  acetaminophen     Tablet .. 1000 milliGRAM(s) Oral every 8 hours  albuterol/ipratropium for Nebulization 3 milliLiter(s) Nebulizer every 6 hours  amoxicillin  875 milliGRAM(s)/clavulanate 1 Tablet(s) Oral every 12 hours  clonazePAM  Tablet 0.5 milliGRAM(s) Oral at bedtime  doxazosin 4 milliGRAM(s) Oral at bedtime  finasteride 5 milliGRAM(s) Oral daily  glycopyrrolate Injectable 0.2 milliGRAM(s) IV Push every 6 hours  pantoprazole    Tablet 40 milliGRAM(s) Oral before breakfast  scopolamine 1 mG/72 Hr(s) Patch 1 Patch Transdermal every 72 hours  sodium chloride 0.9%. 1000 milliLiter(s) (70 mL/Hr) IV Continuous <Continuous>    MEDICATIONS  (PRN):  clonazePAM  Tablet 0.5 milliGRAM(s) Oral every 6 hours PRN Anxiety/Agitation  melatonin 3 milliGRAM(s) Oral at bedtime PRN Sleep  morphine  - Injectable 2 milliGRAM(s) IV Push every 4 hours PRN Severe Pain (7 - 10)      ITEMS UNCHECKED ARE NOT PRESENT    PRESENT SYMPTOMS: [ x]Unable to obtain due to poor mentation   Source if other than patient:  [ ]Family   [ ]Team     Pain (Impact on QOL):  none  Location:  Minimal acceptable level (0-10 scale):                   Aggravating factors:  Quality:  Radiation:  Severity (0-10 scale):    Timing:    Dyspnea:                           [ ]Mild [x ]Moderate [ ]Severe  Anxiety:                             [ ]Mild [x ]Moderate [ ]Severe  Fatigue:                             [ ]Mild [ ]Moderate [ x]Severe  Nausea:                             [ ]Mild [ ]Moderate [ ]Severe  Loss of appetite:              [ ]Mild [ ]Moderate [ ]Severe  Constipation:                    [ ]Mild [ ]Moderate [ ]Severe  Grief Present                    [ ] Yes  [ ] No   PAIN AD Score:	  [email or web address] (Ctrl + left click to view)    Other Symptoms:  [x ]All other review of systems negative     Karnofsky Performance Score/Palliative Performance Status Version 2: 30        %    http://palliative.info/resource_material/[email or web address].pdf  PHYSICAL EXAM:  Vital Signs Last 24 Hrs  Temp: 36.7  HR: 98  BP: 105/60  RR: 19  Resp: 98    Parameters below as of 25 Mar 2024 12:48  Patient On (Oxygen Delivery Method): nasal cannula  O2 Flow (L/min): 2   I&O's Summary   GENERAL:  [ ]Alert  [ ]Oriented x   [ x]Lethargic  [x ]Cachexia  [ ]Unarousable  [ ]Verbal  [ ]Non-Verbal  Behavioral:   [ ] Anxiety  [ ] Delirium [ ] Agitation [x ] Other  HEENT:  [ ]Normal   [x ]Dry mouth   [ ]ET Tube/Trach  [ ]Oral lesions  PULMONARY:   [ ]Clear [ ]Tachypnea  [ ]Audible excessive secretions   [ x]Rhonchi        [ ]Right [ ]Left [x ]Bilateral  [ ]Crackles        [ ]Right [ ]Left [ ]Bilateral  [ ]Wheezing     [ ]Right [ ]Left [ ]Bilateral  CARDIOVASCULAR:    [x ]Regular [ ]Irregular [ ]Tachy  [ ]Brandin [ ]Murmur [ ]Other  GASTROINTESTINAL:  [ x]Soft  [ ]Distended   [ x]+BS  [ x]Non tender [ ]Tender  [ ]PEG [ ]OGT/ NGT   Last BM:  GENITOURINARY:  [ ]Normal [x ] Incontinent   [ ]Oliguria/Anuria   [ ]Gerber  MUSCULOSKELETAL:   [ ]Normal   [ ]Weakness  [ ]Bed/Wheelchair bound [ ]Edema  NEUROLOGIC:   [ ]No focal deficits  [x ] Cognitive impairment  [ ] Dysphagia [ ]Dysarthria [ ] Paresis [ ]Other   SKIN:   [ x]Normal   [ ]Pressure ulcer(s)  [ ]Rash    CRITICAL CARE:  [ ] Shock Present  [ ]Septic [ ]Cardiogenic [ ]Neurologic [ ]Hypovolemic  [ ]  Vasopressors [ ]  Inotropes   [ ] Respiratory failure present  [ ] Acute  [ ] Chronic [ ] Hypoxic  [ ] Hypercarbic [ ] Other  [ ] Other organ failure     LABS:            RADIOLOGY & ADDITIONAL STUDIES:    Protein Calorie Malnutrition Present: [ ] yes [ ] no  [ ] PPSV2 < or = 30%  [ ] significant weight loss [ ] poor nutritional intake [ ] anasarca [ ] catabolic state Artificial Nutrition [ ]     REFERRALS:   [ ]Chaplaincy  [ ] Hospice  [ ]Child Life  [ ]Social Work  [ ]Case management [ ]Holistic Therapy   Goals of Care Document:       Goals of Care:    GOALS OF CARE:  · Participants	Family  · Relative	Yarely Samaniego (sister)     Conversation Discussion:  · Conversation	Diagnosis; Prognosis; MOLST Discussed  · Conversation Details	Significant discussion about goals of care, advanced directives, de-escalation of current interventions and prognosis.   Although initiation attempts were to try to get patient to sisters home in [place], deterioration over the weekend has led to a transition of goals. Patient will now be transferred to [facility] tomorrow.   DNR/DNI.  Comfort care.   MEWS exempt.     What Matters Most To Patient and Family:  · What matters most to patient and family	as above     Treatment Guidelines:  · Decision Maker	Surrogate  · Treatment Guidelines	DNR; DNI  · Treatment Guideline Comments	as above     Location of Discussion:  · Location of discussion	Face to face     Time Spent on Advance Care Planning:  Attending or LUIS Only.     I personally spent 18 minutes on advance care planning services with the patient. This time is separate and distinct from any other care management services provided on this date.    Assessment and Plan:   · Assessment	  88 M with squamous cell carcinoma, shortness of breath, cachexia, malignant pleural effusion, functional quadriplegia, encounter for palliative care.        Problem/Plan - 1:  ·  Problem: Squamous cell lung cancer.   ·  Plan: - Stage IV metastatic disease.  - No longer a candidate for disease targeted therapies.  - Prognosis is poor.  - Patient is now awaiting transfer to [facility].     Problem/Plan - 2:  ·  Problem: Exudative pleural effusion.   ·  Plan: - Patient is s/p Pleurx placement.  - Continue with drainage for comfort.  - Poor prognosis.     Problem/Plan - 3:  ·  Problem: Shortness of breath.   ·  Plan: - Patient with advanced malignancy.  - Morphine 2mg IV Q4 hours prn.     Problem/Plan - 4:  ·  Problem: Encounter for palliative care.   ·  Plan: - Patient with stage IV lung cancer.  - Worsening symptomatology.  - DNR/DNI.  - Decision made for transition to [facility] tomorrow.   - Patient may be discharged on IV opiates.     Problem/Plan - 5:  ·  Problem: Cachexia.   ·  Plan: - Patient with malignant cachexia.  - Poor prognostic sign.  - Liberalize diet.

## 2024-04-01 DIAGNOSIS — I45.19 OTHER RIGHT BUNDLE-BRANCH BLOCK: ICD-10-CM

## 2024-04-01 DIAGNOSIS — R53.2 FUNCTIONAL QUADRIPLEGIA: ICD-10-CM

## 2024-04-01 DIAGNOSIS — R62.7 ADULT FAILURE TO THRIVE: ICD-10-CM

## 2024-04-01 DIAGNOSIS — J91.0 MALIGNANT PLEURAL EFFUSION: ICD-10-CM

## 2024-04-01 DIAGNOSIS — G62.9 POLYNEUROPATHY, UNSPECIFIED: ICD-10-CM

## 2024-04-01 DIAGNOSIS — E61.1 IRON DEFICIENCY: ICD-10-CM

## 2024-04-01 DIAGNOSIS — C34.91 MALIGNANT NEOPLASM OF UNSPECIFIED PART OF RIGHT BRONCHUS OR LUNG: ICD-10-CM

## 2024-04-01 DIAGNOSIS — B97.81 HUMAN METAPNEUMOVIRUS AS THE CAUSE OF DISEASES CLASSIFIED ELSEWHERE: ICD-10-CM

## 2024-04-01 DIAGNOSIS — J86.9 PYOTHORAX WITHOUT FISTULA: ICD-10-CM

## 2024-04-01 DIAGNOSIS — R33.8 OTHER RETENTION OF URINE: ICD-10-CM

## 2024-04-01 DIAGNOSIS — D64.9 ANEMIA, UNSPECIFIED: ICD-10-CM

## 2024-04-01 DIAGNOSIS — J69.0 PNEUMONITIS DUE TO INHALATION OF FOOD AND VOMIT: ICD-10-CM

## 2024-04-01 DIAGNOSIS — N40.1 BENIGN PROSTATIC HYPERPLASIA WITH LOWER URINARY TRACT SYMPTOMS: ICD-10-CM

## 2024-04-01 DIAGNOSIS — Z51.5 ENCOUNTER FOR PALLIATIVE CARE: ICD-10-CM

## 2024-04-01 DIAGNOSIS — G89.3 NEOPLASM RELATED PAIN (ACUTE) (CHRONIC): ICD-10-CM

## 2024-04-01 DIAGNOSIS — F41.9 ANXIETY DISORDER, UNSPECIFIED: ICD-10-CM

## 2024-04-01 DIAGNOSIS — Z66 DO NOT RESUSCITATE: ICD-10-CM

## 2024-04-01 DIAGNOSIS — E46 UNSPECIFIED PROTEIN-CALORIE MALNUTRITION: ICD-10-CM

## 2024-04-01 DIAGNOSIS — A41.9 SEPSIS, UNSPECIFIED ORGANISM: ICD-10-CM

## 2024-04-08 ENCOUNTER — APPOINTMENT (OUTPATIENT)
Dept: PULMONOLOGY | Facility: CLINIC | Age: 89
End: 2024-04-08

## 2025-04-04 NOTE — PROGRESS NOTE ADULT - PROBLEM SELECTOR PLAN 5
Well controlled at goal <140/90. Continue current regimen.   .  Metastatic disease, progressed through previous treatment  -not a candidate for further DMT  -hospice eligible and appropriate
